# Patient Record
Sex: MALE | Race: WHITE | NOT HISPANIC OR LATINO | Employment: OTHER | URBAN - METROPOLITAN AREA
[De-identification: names, ages, dates, MRNs, and addresses within clinical notes are randomized per-mention and may not be internally consistent; named-entity substitution may affect disease eponyms.]

---

## 2017-02-11 ENCOUNTER — HOSPITAL ENCOUNTER (OUTPATIENT)
Dept: LAB | Facility: HOSPITAL | Age: 62
Discharge: HOME | End: 2017-02-11
Attending: FAMILY MEDICINE | Admitting: FAMILY MEDICINE

## 2017-02-11 LAB
ALBUMIN SERPL-MCNC: 4 G/DL (ref 3.5–4.8)
ALBUMIN/GLOB SERPL: 1.3 {RATIO} (ref 1–1.7)
ALP SERPL-CCNC: 61 IU/L (ref 32–91)
ALT SERPL-CCNC: 25 IU/L (ref 17–63)
ANION GAP SERPL CALC-SCNC: 9.4 MMOL/L (ref 10–20)
AST SERPL-CCNC: 21 IU/L (ref 15–41)
BASOPHILS # BLD AUTO: 0 10*3/UL (ref 0–0.2)
BASOPHILS NFR BLD AUTO: 1 % (ref 0–2)
BILIRUB SERPL-MCNC: 0.7 MG/DL (ref 0.3–1.2)
BUN SERPL-MCNC: 13 MG/DL (ref 8–20)
BUN/CREAT SERPL: 16.3 (ref 6.2–20.3)
CALCIUM SERPL-MCNC: 9.7 MG/DL (ref 8.9–10.3)
CHLORIDE SERPL-SCNC: 106 MMOL/L (ref 101–111)
CHOLEST SERPL-MCNC: 176 MG/DL
CHOLEST/HDLC SERPL: 4.5 {RATIO}
CONV CO2: 28 MMOL/L (ref 22–32)
CONV LDL CHOLESTEROL DIRECT: 120 MG/DL (ref 0–100)
CONV TOTAL PROTEIN: 7 G/DL (ref 6.1–7.9)
CREAT UR-MCNC: 0.8 MG/DL (ref 0.7–1.2)
DIFFERENTIAL METHOD BLD: (no result)
EOSINOPHIL # BLD AUTO: 0.4 10*3/UL (ref 0–0.3)
EOSINOPHIL # BLD AUTO: 6 % (ref 0–3)
ERYTHROCYTE [DISTWIDTH] IN BLOOD BY AUTOMATED COUNT: 14.4 % (ref 11.5–14.5)
GLOBULIN UR ELPH-MCNC: 3 G/DL (ref 2.5–3.8)
GLUCOSE SERPL-MCNC: 102 MG/DL (ref 65–99)
HCT VFR BLD AUTO: 49.5 % (ref 40–54)
HDLC SERPL-MCNC: 39 MG/DL
HGB BLD-MCNC: 16.6 G/DL (ref 14–18)
LDLC/HDLC SERPL: 3.1 {RATIO}
LIPID INTERPRETATION: ABNORMAL
LYMPHOCYTES # BLD AUTO: 1.9 10*3/UL (ref 0.8–4.8)
LYMPHOCYTES NFR BLD AUTO: 29 % (ref 18–42)
MCH RBC QN AUTO: 31.2 PG (ref 26–32)
MCHC RBC AUTO-ENTMCNC: 33.4 G/DL (ref 32–36)
MCV RBC AUTO: 93.4 FL (ref 80–94)
MONOCYTES # BLD AUTO: 0.5 10*3/UL (ref 0.1–1.3)
MONOCYTES NFR BLD AUTO: 7 % (ref 2–11)
NEUTROPHILS # BLD AUTO: 3.8 10*3/UL (ref 2.3–8.6)
NEUTROPHILS NFR BLD AUTO: 57 % (ref 50–75)
NRBC BLD AUTO-RTO: 0 /100{WBCS}
NRBC/RBC NFR BLD MANUAL: 0 10*3/UL
PLATELET # BLD AUTO: 159 10*3/UL (ref 150–450)
PMV BLD AUTO: 10 FL (ref 7.4–10.4)
POTASSIUM SERPL-SCNC: 4.4 MMOL/L (ref 3.6–5.1)
RBC # BLD AUTO: 5.3 10*6/UL (ref 4.6–6)
SODIUM SERPL-SCNC: 139 MMOL/L (ref 136–144)
TRIGL SERPL-MCNC: 93 MG/DL
TSH SERPL-ACNC: 0.39 UIU/ML (ref 0.34–5.6)
VLDLC SERPL CALC-MCNC: 17 MG/DL
WBC # BLD AUTO: 6.6 10*3/UL (ref 4.5–11.5)

## 2017-09-08 ENCOUNTER — HOSPITAL ENCOUNTER (OUTPATIENT)
Dept: OTHER | Facility: HOSPITAL | Age: 62
Setting detail: SPECIMEN
Discharge: HOME | End: 2017-09-08
Attending: FAMILY MEDICINE | Admitting: FAMILY MEDICINE

## 2019-11-14 RX ORDER — FUROSEMIDE 20 MG/1
TABLET ORAL
COMMUNITY
Start: 2015-04-27 | End: 2019-11-14 | Stop reason: SDUPTHER

## 2019-11-15 RX ORDER — FUROSEMIDE 20 MG/1
20 TABLET ORAL DAILY
Qty: 30 TABLET | Refills: 0 | Status: SHIPPED | OUTPATIENT
Start: 2019-11-15 | End: 2020-02-03

## 2019-11-15 RX ORDER — LISINOPRIL 10 MG/1
10 TABLET ORAL DAILY
Qty: 30 TABLET | Refills: 0 | Status: SHIPPED | OUTPATIENT
Start: 2019-11-15 | End: 2020-02-03

## 2019-11-15 RX ORDER — LISINOPRIL 10 MG/1
TABLET ORAL
COMMUNITY
Start: 2015-03-09 | End: 2019-11-15 | Stop reason: SDUPTHER

## 2020-02-02 PROBLEM — M25.512 ARTHRALGIA OF LEFT ACROMIOCLAVICULAR JOINT: Status: ACTIVE | Noted: 2017-11-16

## 2020-02-03 ENCOUNTER — OFFICE VISIT (OUTPATIENT)
Dept: FAMILY MEDICINE CLINIC | Facility: CLINIC | Age: 65
End: 2020-02-03

## 2020-02-03 VITALS
WEIGHT: 257 LBS | BODY MASS INDEX: 35.98 KG/M2 | SYSTOLIC BLOOD PRESSURE: 149 MMHG | HEART RATE: 73 BPM | TEMPERATURE: 98.1 F | DIASTOLIC BLOOD PRESSURE: 93 MMHG | OXYGEN SATURATION: 96 % | HEIGHT: 71 IN

## 2020-02-03 DIAGNOSIS — I10 ESSENTIAL HYPERTENSION: Primary | ICD-10-CM

## 2020-02-03 DIAGNOSIS — R73.09 ABNORMAL GLUCOSE: ICD-10-CM

## 2020-02-03 DIAGNOSIS — I89.0 LYMPHEDEMA: ICD-10-CM

## 2020-02-03 DIAGNOSIS — F32.A DEPRESSION, UNSPECIFIED DEPRESSION TYPE: ICD-10-CM

## 2020-02-03 DIAGNOSIS — E78.2 HYPERLIPEMIA, MIXED: ICD-10-CM

## 2020-02-03 DIAGNOSIS — F17.210 CIGARETTE SMOKER: ICD-10-CM

## 2020-02-03 DIAGNOSIS — R42 VERTIGO: ICD-10-CM

## 2020-02-03 DIAGNOSIS — Z12.5 SCREENING FOR PROSTATE CANCER: ICD-10-CM

## 2020-02-03 DIAGNOSIS — Z87.828 HISTORY OF HEAD INJURY: ICD-10-CM

## 2020-02-03 DIAGNOSIS — Z80.42 FAMILY HISTORY OF PROSTATE CANCER: ICD-10-CM

## 2020-02-03 PROBLEM — R23.3 PETECHIAE: Status: ACTIVE | Noted: 2017-07-27

## 2020-02-03 PROBLEM — A46 ERYSIPELAS OF LOWER EXTREMITY: Status: ACTIVE | Noted: 2017-04-06

## 2020-02-03 PROBLEM — L72.9 CYST OF SKIN: Status: ACTIVE | Noted: 2017-12-13
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
## 2020-02-03 PROBLEM — IMO0002 DEGENERATION OF INTERVERTEBRAL DISC: Status: ACTIVE | Noted: 2017-10-13

## 2020-02-03 PROCEDURE — 99214 OFFICE O/P EST MOD 30 MIN: CPT | Performed by: FAMILY MEDICINE

## 2020-02-03 RX ORDER — LISINOPRIL 10 MG/1
10 TABLET ORAL DAILY
Qty: 90 TABLET | Refills: 3 | Status: SHIPPED | OUTPATIENT
Start: 2020-02-03 | End: 2020-04-06

## 2020-02-03 RX ORDER — CETIRIZINE HYDROCHLORIDE 10 MG/1
1 TABLET ORAL DAILY
COMMUNITY
Start: 2016-11-16

## 2020-02-03 RX ORDER — MECLIZINE HYDROCHLORIDE 25 MG/1
25 TABLET ORAL 3 TIMES DAILY PRN
Qty: 30 TABLET | Refills: 1 | Status: SHIPPED | OUTPATIENT
Start: 2020-02-03 | End: 2020-08-03

## 2020-02-03 RX ORDER — FUROSEMIDE 20 MG/1
10 TABLET ORAL DAILY
Qty: 90 TABLET | Refills: 1 | Status: SHIPPED | OUTPATIENT
Start: 2020-02-03 | End: 2020-08-07

## 2020-02-03 RX ORDER — FLUOXETINE HYDROCHLORIDE 20 MG/1
20 CAPSULE ORAL DAILY
Qty: 90 CAPSULE | Refills: 1 | Status: SHIPPED | OUTPATIENT
Start: 2020-02-03 | End: 2020-08-03

## 2020-02-03 NOTE — PATIENT INSTRUCTIONS
"DASH Eating Plan  DASH stands for \"Dietary Approaches to Stop Hypertension.\" The DASH eating plan is a healthy eating plan that has been shown to reduce high blood pressure (hypertension). It may also reduce your risk for type 2 diabetes, heart disease, and stroke. The DASH eating plan may also help with weight loss.  What are tips for following this plan?    General guidelines  · Avoid eating more than 2,300 mg (milligrams) of salt (sodium) a day. If you have hypertension, you may need to reduce your sodium intake to 1,500 mg a day.  · Limit alcohol intake to no more than 1 drink a day for nonpregnant women and 2 drinks a day for men. One drink equals 12 oz of beer, 5 oz of wine, or 1½ oz of hard liquor.  · Work with your health care provider to maintain a healthy body weight or to lose weight. Ask what an ideal weight is for you.  · Get at least 30 minutes of exercise that causes your heart to beat faster (aerobic exercise) most days of the week. Activities may include walking, swimming, or biking.  · Work with your health care provider or diet and nutrition specialist (dietitian) to adjust your eating plan to your individual calorie needs.  Reading food labels    · Check food labels for the amount of sodium per serving. Choose foods with less than 5 percent of the Daily Value of sodium. Generally, foods with less than 300 mg of sodium per serving fit into this eating plan.  · To find whole grains, look for the word \"whole\" as the first word in the ingredient list.  Shopping  · Buy products labeled as \"low-sodium\" or \"no salt added.\"  · Buy fresh foods. Avoid canned foods and premade or frozen meals.  Cooking  · Avoid adding salt when cooking. Use salt-free seasonings or herbs instead of table salt or sea salt. Check with your health care provider or pharmacist before using salt substitutes.  · Do not wei foods. Cook foods using healthy methods such as baking, boiling, grilling, and broiling instead.  · Cook with " heart-healthy oils, such as olive, canola, soybean, or sunflower oil.  Meal planning  · Eat a balanced diet that includes:  ? 5 or more servings of fruits and vegetables each day. At each meal, try to fill half of your plate with fruits and vegetables.  ? Up to 6-8 servings of whole grains each day.  ? Less than 6 oz of lean meat, poultry, or fish each day. A 3-oz serving of meat is about the same size as a deck of cards. One egg equals 1 oz.  ? 2 servings of low-fat dairy each day.  ? A serving of nuts, seeds, or beans 5 times each week.  ? Heart-healthy fats. Healthy fats called Omega-3 fatty acids are found in foods such as flaxseeds and coldwater fish, like sardines, salmon, and mackerel.  · Limit how much you eat of the following:  ? Canned or prepackaged foods.  ? Food that is high in trans fat, such as fried foods.  ? Food that is high in saturated fat, such as fatty meat.  ? Sweets, desserts, sugary drinks, and other foods with added sugar.  ? Full-fat dairy products.  · Do not salt foods before eating.  · Try to eat at least 2 vegetarian meals each week.  · Eat more home-cooked food and less restaurant, buffet, and fast food.  · When eating at a restaurant, ask that your food be prepared with less salt or no salt, if possible.  What foods are recommended?  The items listed may not be a complete list. Talk with your dietitian about what dietary choices are best for you.  Grains  Whole-grain or whole-wheat bread. Whole-grain or whole-wheat pasta. Brown rice. Oatmeal. Quinoa. Bulgur. Whole-grain and low-sodium cereals. Mary bread. Low-fat, low-sodium crackers. Whole-wheat flour tortillas.  Vegetables  Fresh or frozen vegetables (raw, steamed, roasted, or grilled). Low-sodium or reduced-sodium tomato and vegetable juice. Low-sodium or reduced-sodium tomato sauce and tomato paste. Low-sodium or reduced-sodium canned vegetables.  Fruits  All fresh, dried, or frozen fruit. Canned fruit in natural juice (without  added sugar).  Meat and other protein foods  Skinless chicken or turkey. Ground chicken or turkey. Pork with fat trimmed off. Fish and seafood. Egg whites. Dried beans, peas, or lentils. Unsalted nuts, nut butters, and seeds. Unsalted canned beans. Lean cuts of beef with fat trimmed off. Low-sodium, lean deli meat.  Dairy  Low-fat (1%) or fat-free (skim) milk. Fat-free, low-fat, or reduced-fat cheeses. Nonfat, low-sodium ricotta or cottage cheese. Low-fat or nonfat yogurt. Low-fat, low-sodium cheese.  Fats and oils  Soft margarine without trans fats. Vegetable oil. Low-fat, reduced-fat, or light mayonnaise and salad dressings (reduced-sodium). Canola, safflower, olive, soybean, and sunflower oils. Avocado.  Seasoning and other foods  Herbs. Spices. Seasoning mixes without salt. Unsalted popcorn and pretzels. Fat-free sweets.  What foods are not recommended?  The items listed may not be a complete list. Talk with your dietitian about what dietary choices are best for you.  Grains  Baked goods made with fat, such as croissants, muffins, or some breads. Dry pasta or rice meal packs.  Vegetables  Creamed or fried vegetables. Vegetables in a cheese sauce. Regular canned vegetables (not low-sodium or reduced-sodium). Regular canned tomato sauce and paste (not low-sodium or reduced-sodium). Regular tomato and vegetable juice (not low-sodium or reduced-sodium). Pickles. Olives.  Fruits  Canned fruit in a light or heavy syrup. Fried fruit. Fruit in cream or butter sauce.  Meat and other protein foods  Fatty cuts of meat. Ribs. Fried meat. Osorio. Sausage. Bologna and other processed lunch meats. Salami. Fatback. Hotdogs. Bratwurst. Salted nuts and seeds. Canned beans with added salt. Canned or smoked fish. Whole eggs or egg yolks. Chicken or turkey with skin.  Dairy  Whole or 2% milk, cream, and half-and-half. Whole or full-fat cream cheese. Whole-fat or sweetened yogurt. Full-fat cheese. Nondairy creamers. Whipped toppings.  Processed cheese and cheese spreads.  Fats and oils  Butter. Stick margarine. Lard. Shortening. Ghee. Osorio fat. Tropical oils, such as coconut, palm kernel, or palm oil.  Seasoning and other foods  Salted popcorn and pretzels. Onion salt, garlic salt, seasoned salt, table salt, and sea salt. Worcestershire sauce. Tartar sauce. Barbecue sauce. Teriyaki sauce. Soy sauce, including reduced-sodium. Steak sauce. Canned and packaged gravies. Fish sauce. Oyster sauce. Cocktail sauce. Horseradish that you find on the shelf. Ketchup. Mustard. Meat flavorings and tenderizers. Bouillon cubes. Hot sauce and Tabasco sauce. Premade or packaged marinades. Premade or packaged taco seasonings. Relishes. Regular salad dressings.  Where to find more information:  · National Heart, Lung, and Blood [place]: www.nhlbi.nih.gov  · American Heart Association: www.heart.org  Summary  · The DASH eating plan is a healthy eating plan that has been shown to reduce high blood pressure (hypertension). It may also reduce your risk for type 2 diabetes, heart disease, and stroke.  · With the DASH eating plan, you should limit salt (sodium) intake to 2,300 mg a day. If you have hypertension, you may need to reduce your sodium intake to 1,500 mg a day.  · When on the DASH eating plan, aim to eat more fresh fruits and vegetables, whole grains, lean proteins, low-fat dairy, and heart-healthy fats.  · Work with your health care provider or diet and nutrition specialist (dietitian) to adjust your eating plan to your individual calorie needs.  This information is not intended to replace advice given to you by your health care provider. Make sure you discuss any questions you have with your health care provider.  Document Released: 12/06/2012 Document Revised: 12/11/2017 Document Reviewed: 12/11/2017  [employer] Interactive Patient Education © 2019 [employer]    Lymphedema    Lymphedema is swelling that is caused by the abnormal collection of lymph in  the tissues under the skin. Lymph is fluid from the tissues in your body that is removed through the lymphatic system. This system is part of your body's defense system (immune system) and includes lymph nodes and lymph vessels. The lymph vessels collect and carry the excess fluid, fats, proteins, and wastes from the tissues of the body to the bloodstream. This system also works to clean and remove bacteria and waste products from the body.  Lymphedema occurs when the lymphatic system is blocked. When the lymph vessels or lymph nodes are blocked or damaged, lymph does not drain properly. This causes an abnormal buildup of lymph, which leads to swelling in the affected area. This may include the trunk area, or an arm or leg. Lymphedema cannot be cured by medicines, but various methods can be used to help reduce the swelling.  There are two types of lymphedema: primary lymphedema and secondary lymphedema.  What are the causes?  The cause of this condition depends on the type of lymphedema that you have.  · Primary lymphedema is caused by the absence of lymph vessels or having abnormal lymph vessels at birth.  · Secondary lymphedema occurs when lymph vessels are blocked or damaged. Secondary lymphedema is more common. Common causes of lymph vessel blockage include:  ? Skin infection, such as cellulitis.  ? Infection by parasites (filariasis).  ? Injury.  ? Radiation therapy.  ? Cancer.  ? Formation of scar tissue.  ? Surgery.  What are the signs or symptoms?  Symptoms of this condition include:  · Swelling of the arm or leg.  · A heavy or tight feeling in the arm or leg.  · Swelling of the feet, toes, or fingers. Shoes or rings may fit more tightly than before.  · Redness of the skin over the affected area.  · Limited movement of the affected limb.  · Sensitivity to touch or discomfort in the affected limb.  How is this diagnosed?  This condition may be diagnosed based on:  · Your symptoms and medical history.  · A  physical exam.  · Bioimpedance spectroscopy. In this test, painless electrical currents are used to measure fluid levels in your body.  · Imaging tests, such as:  ? Lymphoscintigraphy. In this test, a low dose of a radioactive substance is injected to trace the flow of lymph through the lymph vessels.  ? MRI.  ? CT scan.  ? Duplex ultrasound. This test uses sound waves to produce images of the vessels and the blood flow on a screen.  ? Lymphangiography. In this test, a contrast dye is injected into the lymph vessel to help show blockages.  How is this treated?  Treatment for this condition may depend on the cause of your lymphedema. Treatment may include:  · Complete decongestive therapy (CDT). This is done by a certified lymphedema therapist to reduce fluid congestion. This therapy includes:  ? Manual lymph drainage. This is a special massage technique that promotes lymph drainage out of a limb.  ? Skin care.  ? Compression wrapping of the affected area.  ? Specific exercises. Certain exercises can help fluid move out of the affected limb.  · Compression. Various methods may be used to apply pressure to the affected limb to reduce the swelling. They include:  ? Wearing compression stockings or sleeves on the affected limb.  ? Wrapping the affected limb with special bandages.  · Surgery. This is usually done for severe cases only. For example, surgery may be done if you have trouble moving the limb or if the swelling does not get better with other treatments.  If an underlying condition is causing the lymphedema, treatment for that condition will be done. For example, antibiotic medicines may be used to treat an infection.  Follow these instructions at home:  Self-care  · The affected area is more likely to become injured or infected. Take these steps to help prevent infection:  ? Keep the affected area clean and dry.  ? Use approved creams or lotions to keep the skin moisturized.  ? Protect your skin from  cuts:  § Use gloves while cooking or gardening.  § Do not walk barefoot.  § If you shave the affected area, use an electric razor.  · Do not wear tight clothes, shoes, or jewelry.  · Eat a healthy diet that includes a lot of fruits and vegetables.  Activity  · Exercise regularly as directed by your health care provider.  · Do not sit with your legs crossed.  · When possible, keep the affected limb raised (elevated) above the level of your heart.  · Avoid carrying things with an arm that is affected by lymphedema.  General instructions  · Wear compression stockings or sleeves as told by your health care provider.  · Note any changes in size of the affected limb. You may be instructed to take regular measurements and keep track of them.  · Take over-the-counter and prescription medicines only as told by your health care provider.  · If you were prescribed an antibiotic medicine, take or apply it as told by your health care provider. Do not stop using the antibiotic even if you start to feel better.  · Do not use heating pads or ice packs over the affected area.  · Avoid having blood draws, IV insertions, or blood pressure checked on the affected limb.  · Keep all follow-up visits as told by your health care provider. This is important.  Contact a health care provider if you:  · Continue to have swelling in your limb.  · Have a cut that does not heal.  · Have redness or pain in the affected area.  Get help right away if you:  · Have new swelling in your limb that comes on suddenly.  · Develop purplish spots, rash or sores (lesions) on your affected limb.  · Have shortness of breath.  · Have a fever or chills.  Summary  · Lymphedema is swelling that is caused by the abnormal collection of lymph in the tissues under the skin.  · Lymph is fluid from the tissues in your body that is removed through the lymphatic system. This system collects and carries excess fluid, fats, proteins, and wastes from the tissues of the body  to the bloodstream.  · Lymphedema causes swelling, pain, and redness in the affected area. This may include the trunk area, or an arm or leg.  · Treatment for this condition may depend on the cause of your lymphedema. Treatment may include complete decongestive therapy (CDT), compression methods, surgery, or treating the underlying cause.  This information is not intended to replace advice given to you by your health care provider. Make sure you discuss any questions you have with your health care provider.  Document Released: 10/14/2008 Document Revised: 12/31/2018 Document Reviewed: 12/31/2018  [employer] Interactive Patient Education © 2019 [employer]
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
## 2020-02-04 ENCOUNTER — RESULTS ENCOUNTER (OUTPATIENT)
Dept: FAMILY MEDICINE CLINIC | Facility: CLINIC | Age: 65
End: 2020-02-04

## 2020-02-04 DIAGNOSIS — Z80.42 FAMILY HISTORY OF PROSTATE CANCER: ICD-10-CM

## 2020-02-04 DIAGNOSIS — Z12.5 SCREENING FOR PROSTATE CANCER: ICD-10-CM

## 2020-02-04 DIAGNOSIS — E78.2 HYPERLIPEMIA, MIXED: ICD-10-CM

## 2020-02-04 DIAGNOSIS — R73.09 ABNORMAL GLUCOSE: ICD-10-CM

## 2020-02-04 DIAGNOSIS — I10 ESSENTIAL HYPERTENSION: ICD-10-CM

## 2020-02-05 DIAGNOSIS — E78.2 HYPERLIPEMIA, MIXED: Primary | ICD-10-CM

## 2020-02-05 DIAGNOSIS — R73.09 ABNORMAL GLUCOSE: ICD-10-CM

## 2020-02-05 DIAGNOSIS — I10 ESSENTIAL HYPERTENSION: ICD-10-CM

## 2020-02-05 LAB
ALBUMIN SERPL-MCNC: 4.9 G/DL (ref 3.8–4.8)
ALBUMIN/GLOB SERPL: 2.5 {RATIO} (ref 1.2–2.2)
ALP SERPL-CCNC: 64 IU/L (ref 39–117)
ALT SERPL-CCNC: 14 IU/L (ref 0–44)
AST SERPL-CCNC: 17 IU/L (ref 0–40)
BILIRUB SERPL-MCNC: 0.5 MG/DL (ref 0–1.2)
BUN SERPL-MCNC: 18 MG/DL (ref 8–27)
BUN/CREAT SERPL: 19 (ref 10–24)
CALCIUM SERPL-MCNC: 9.9 MG/DL (ref 8.6–10.2)
CHLORIDE SERPL-SCNC: 102 MMOL/L (ref 96–106)
CHOLEST SERPL-MCNC: 195 MG/DL (ref 100–199)
CO2 SERPL-SCNC: 24 MMOL/L (ref 20–29)
CREAT SERPL-MCNC: 0.96 MG/DL (ref 0.76–1.27)
GLOBULIN SER CALC-MCNC: 2 G/DL (ref 1.5–4.5)
GLUCOSE SERPL-MCNC: 98 MG/DL (ref 65–99)
HBA1C MFR BLD: 5.7 % (ref 4.8–5.6)
HDLC SERPL-MCNC: 42 MG/DL
LDLC SERPL CALC-MCNC: 132 MG/DL (ref 0–99)
POTASSIUM SERPL-SCNC: 4.7 MMOL/L (ref 3.5–5.2)
PROT SERPL-MCNC: 6.9 G/DL (ref 6–8.5)
PSA SERPL-MCNC: 0.7 NG/ML (ref 0–4)
SODIUM SERPL-SCNC: 142 MMOL/L (ref 134–144)
TRIGL SERPL-MCNC: 104 MG/DL (ref 0–149)
VLDLC SERPL CALC-MCNC: 21 MG/DL (ref 5–40)

## 2020-02-05 RX ORDER — ATORVASTATIN CALCIUM 10 MG/1
10 TABLET, FILM COATED ORAL DAILY
Qty: 90 TABLET | Refills: 1 | Status: SHIPPED | OUTPATIENT
Start: 2020-02-05 | End: 2020-08-07 | Stop reason: SINTOL

## 2020-02-16 PROBLEM — F32.A DEPRESSION: Status: ACTIVE | Noted: 2020-02-16

## 2020-02-16 PROBLEM — Z87.828 HISTORY OF TRAUMATIC HEAD INJURY: Status: ACTIVE | Noted: 2020-02-16

## 2020-02-16 PROBLEM — Z91.89 CANDIDATE FOR STATIN THERAPY DUE TO RISK OF FUTURE CARDIOVASCULAR EVENT: Status: ACTIVE | Noted: 2020-02-16

## 2020-02-17 ENCOUNTER — OFFICE VISIT (OUTPATIENT)
Dept: FAMILY MEDICINE CLINIC | Facility: CLINIC | Age: 65
End: 2020-02-17

## 2020-02-17 VITALS
OXYGEN SATURATION: 94 % | WEIGHT: 257 LBS | HEIGHT: 71 IN | HEART RATE: 76 BPM | DIASTOLIC BLOOD PRESSURE: 84 MMHG | SYSTOLIC BLOOD PRESSURE: 139 MMHG | BODY MASS INDEX: 35.98 KG/M2 | TEMPERATURE: 98 F

## 2020-02-17 DIAGNOSIS — Z91.89 CANDIDATE FOR STATIN THERAPY DUE TO RISK OF FUTURE CARDIOVASCULAR EVENT: ICD-10-CM

## 2020-02-17 DIAGNOSIS — F32.A DEPRESSION, UNSPECIFIED DEPRESSION TYPE: ICD-10-CM

## 2020-02-17 DIAGNOSIS — S42.022S CLOSED DISPLACED FRACTURE OF SHAFT OF LEFT CLAVICLE, SEQUELA: ICD-10-CM

## 2020-02-17 DIAGNOSIS — I10 ESSENTIAL HYPERTENSION: Primary | ICD-10-CM

## 2020-02-17 DIAGNOSIS — Z87.828 HISTORY OF TRAUMATIC HEAD INJURY: ICD-10-CM

## 2020-02-17 PROCEDURE — 99214 OFFICE O/P EST MOD 30 MIN: CPT | Performed by: FAMILY MEDICINE

## 2020-02-17 RX ORDER — GABAPENTIN 100 MG/1
CAPSULE ORAL
Qty: 150 CAPSULE | Refills: 0 | Status: SHIPPED | OUTPATIENT
Start: 2020-02-17 | End: 2020-03-08
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
## 2020-03-08 DIAGNOSIS — S42.022S CLOSED DISPLACED FRACTURE OF SHAFT OF LEFT CLAVICLE, SEQUELA: ICD-10-CM

## 2020-03-08 RX ORDER — GABAPENTIN 100 MG/1
CAPSULE ORAL
Qty: 150 CAPSULE | Refills: 0 | Status: SHIPPED | OUTPATIENT
Start: 2020-03-08 | End: 2020-04-14

## 2020-04-06 ENCOUNTER — TELEPHONE (OUTPATIENT)
Dept: FAMILY MEDICINE CLINIC | Facility: CLINIC | Age: 65
End: 2020-04-06

## 2020-04-06 DIAGNOSIS — I10 ESSENTIAL HYPERTENSION: ICD-10-CM

## 2020-04-06 RX ORDER — LISINOPRIL 10 MG/1
TABLET ORAL
Qty: 180 TABLET | Refills: 3 | Status: SHIPPED | OUTPATIENT
Start: 2020-04-06 | End: 2020-05-01

## 2020-04-06 NOTE — TELEPHONE ENCOUNTER
TC from patient requesting refill of the following med be sent to [facility]:    Lisinopril 10mg   Takes 15mg in AM and 5mg in PM.    This is a dosage change since last RX

## 2020-04-06 NOTE — TELEPHONE ENCOUNTER
Please clarify.   At OV on 2/3/2020 BP was not at goal and increased lisinopril from 5 mg to 10 mg once daily.   At most recent OV BP was at upper limit of goal and he declined to increase further.   Did he have increased home readings and increase the dose? Did he accidentally take new and old RX at same time?  regardless it sounds like he is currently taking 20 mg daily. Ask what recent home BP readings are doing and assuming between 110-140/60-80 ask if he would be willing to change to 20 mg one tablet daily

## 2020-04-06 NOTE — TELEPHONE ENCOUNTER
Pt states that he has been on the 15mg in am and 5mg pm for about a month and a half bc his pressure got up to 150/86 and he changed dose himself. He said this am it was 135/82. He is willing to go to the 20mg one tablet daily but he said he feels comfortable with the 15mg in morning and 5mg pm to sleep on and feels it is holding his pressure at control.

## 2020-04-14 DIAGNOSIS — S42.022S CLOSED DISPLACED FRACTURE OF SHAFT OF LEFT CLAVICLE, SEQUELA: ICD-10-CM

## 2020-04-14 RX ORDER — GABAPENTIN 100 MG/1
CAPSULE ORAL
Qty: 150 CAPSULE | Refills: 3 | Status: SHIPPED | OUTPATIENT
Start: 2020-04-14 | End: 2020-04-22

## 2020-04-20 ENCOUNTER — TELEPHONE (OUTPATIENT)
Dept: FAMILY MEDICINE CLINIC | Facility: CLINIC | Age: 65
End: 2020-04-20

## 2020-04-20 NOTE — TELEPHONE ENCOUNTER
PATIENT HAS A VERY TENDER AREA ON THE BACK OF HIS HEAD, HE IS INQUIRING IF HE COULD BE SEEN.  PLEASE LET HIM KNOW WHAT TYPE OF VISIT HE NEEDS TO HAVE

## 2020-04-20 NOTE — TELEPHONE ENCOUNTER
Spoke with patient, he states that there is not a cyst or bite or a lump. It is just a sore spot when he rubs his hand over the back of his head and it hurts when he lays down at night. The area is numb like feeling.   Do you still want patient to come in office or do a telephone visit. Just clarifying

## 2020-04-20 NOTE — TELEPHONE ENCOUNTER
Spoke with patient, he states this did not start after his neurontin,i placed him on schedule for wed for a video visit.

## 2020-04-20 NOTE — TELEPHONE ENCOUNTER
Please ask patient if this started after he started Neurontin?  Could possibly be a side effect of neuropathy.  Otherwise it could be due to nerve injury from his previous head injury and Neurontin could actually make it better.  Please offer patient a video visit to discuss in further detail.

## 2020-04-20 NOTE — TELEPHONE ENCOUNTER
Please contact patient and get additional history.  Due to has had, is there a cyst or a mass or an abscess, has he had a tick or other insect bite?  If needed please offer him an appointment this afternoon.

## 2020-04-22 ENCOUNTER — TELEMEDICINE (OUTPATIENT)
Dept: FAMILY MEDICINE CLINIC | Facility: CLINIC | Age: 65
End: 2020-04-22

## 2020-04-22 DIAGNOSIS — S42.022S CLOSED DISPLACED FRACTURE OF SHAFT OF LEFT CLAVICLE, SEQUELA: ICD-10-CM

## 2020-04-22 DIAGNOSIS — M25.512 ARTHRALGIA OF LEFT ACROMIOCLAVICULAR JOINT: ICD-10-CM

## 2020-04-22 DIAGNOSIS — S16.1XXA CERVICAL MUSCLE STRAIN, INITIAL ENCOUNTER: Primary | ICD-10-CM

## 2020-04-22 DIAGNOSIS — G54.0 BRACHIAL PLEXUS NEUROPATHY: ICD-10-CM

## 2020-04-22 PROCEDURE — 99442 PR PHYS/QHP TELEPHONE EVALUATION 11-20 MIN: CPT | Performed by: FAMILY MEDICINE

## 2020-04-22 RX ORDER — CYCLOBENZAPRINE HCL 10 MG
TABLET ORAL
Qty: 30 TABLET | Refills: 1 | Status: SHIPPED | OUTPATIENT
Start: 2020-04-22 | End: 2020-08-07

## 2020-04-22 RX ORDER — GABAPENTIN 100 MG/1
CAPSULE ORAL
Qty: 210 CAPSULE | Refills: 3 | Status: SHIPPED | OUTPATIENT
Start: 2020-04-22 | End: 2020-05-20 | Stop reason: SDUPTHER
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
## 2020-04-22 NOTE — PATIENT INSTRUCTIONS
Cervical Strain and Sprain Rehab  Ask your health care provider which exercises are safe for you. Do exercises exactly as told by your health care provider and adjust them as directed. It is normal to feel mild stretching, pulling, tightness, or discomfort as you do these exercises. Stop right away if you feel sudden pain or your pain gets worse. Do not begin these exercises until told by your health care provider.  Stretching and range-of-motion exercises  Cervical side bending    1. Using good posture, sit on a stable chair or stand up.  2. Without moving your shoulders, slowly tilt your left / right ear to your shoulder until you feel a stretch in the opposite side neck muscles. You should be looking straight ahead.  3. Hold for __________ seconds.  4. Repeat with the other side of your neck.  Repeat __________ times. Complete this exercise __________ times a day.  Cervical rotation    1. Using good posture, sit on a stable chair or stand up.  2. Slowly turn your head to the side as if you are looking over your left / right shoulder.  ? Keep your eyes level with the ground.  ? Stop when you feel a stretch along the side and the back of your neck.  3. Hold for __________ seconds.  4. Repeat this by turning to your other side.  Repeat __________ times. Complete this exercise __________ times a day.  Thoracic extension and pectoral stretch  1. Roll a towel or a small blanket so it is about 4 inches (10 cm) in diameter.  2. Lie down on your back on a firm surface.  3. Put the towel lengthwise, under your spine in the middle of your back. It should not be under your shoulder blades. The towel should line up with your spine from your middle back to your lower back.  4. Put your hands behind your head and let your elbows fall out to your sides.  5. Hold for __________ seconds.  Repeat __________ times. Complete this exercise __________ times a day.  Strengthening exercises  Isometric upper cervical flexion  1. Lie on  your back with a thin pillow behind your head and a small rolled-up towel under your neck.  2. Gently tuck your chin toward your chest and nod your head down to look toward your feet. Do not lift your head off the pillow.  3. Hold for __________ seconds.  4. Release the tension slowly. Relax your neck muscles completely before you repeat this exercise.  Repeat __________ times. Complete this exercise __________ times a day.  Isometric cervical extension    1. Stand about 6 inches (15 cm) away from a wall, with your back facing the wall.  2. Place a soft object, about 6-8 inches (15-20 cm) in diameter, between the back of your head and the wall. A soft object could be a small pillow, a ball, or a folded towel.  3. Gently tilt your head back and press into the soft object. Keep your jaw and forehead relaxed.  4. Hold for __________ seconds.  5. Release the tension slowly. Relax your neck muscles completely before you repeat this exercise.  Repeat __________ times. Complete this exercise __________ times a day.  Posture and body mechanics  Body mechanics refers to the movements and positions of your body while you do your daily activities. Posture is part of body mechanics. Good posture and healthy body mechanics can help to relieve stress in your body's tissues and joints. Good posture means that your spine is in its natural S-curve position (your spine is neutral), your shoulders are pulled back slightly, and your head is not tipped forward. The following are general guidelines for applying improved posture and body mechanics to your everyday activities.  Sitting    1. When sitting, keep your spine neutral and keep your feet flat on the floor. Use a footrest, if necessary, and keep your thighs parallel to the floor. Avoid rounding your shoulders, and avoid tilting your head forward.  2. When working at a desk or a computer, keep your desk at a height where your hands are slightly lower than your elbows. Slide your  chair under your desk so you are close enough to maintain good posture.  3. When working at a computer, place your monitor at a height where you are looking straight ahead and you do not have to tilt your head forward or downward to look at the screen.  Standing    · When standing, keep your spine neutral and keep your feet about hip-width apart. Keep a slight bend in your knees. Your ears, shoulders, and hips should line up.  · When you do a task in which you  one place for a long time, place one foot up on a stable object that is 2-4 inches (5-10 cm) high, such as a footstool. This helps keep your spine neutral.  Resting  When lying down and resting, avoid positions that are most painful for you. Try to support your neck in a neutral position. You can use a contour pillow or a small rolled-up towel. Your pillow should support your neck but not push on it.  This information is not intended to replace advice given to you by your health care provider. Make sure you discuss any questions you have with your health care provider.  Document Released: 12/18/2006 Document Revised: 09/18/2019 Document Reviewed: 09/18/2019  [employer] Interactive Patient Education © 2020 [employer]    Paresthesia  Paresthesia is an abnormal burning or prickling sensation. It is usually felt in the hands, arms, legs, or feet. However, it may occur in any part of the body. Usually, paresthesia is not painful. It may feel like:  · Tingling or numbness.  · Buzzing.  · Itching.  Paresthesia may occur without any clear cause, or it may be caused by:  · Breathing too quickly (hyperventilation).  · Pressure on a nerve.  · An underlying medical condition.  · Side effects of a medication.  · Nutritional deficiencies.  · Exposure to toxic chemicals.  Most people experience temporary (transient) paresthesia at some time in their lives. For some people, it may be long-lasting (chronic) because of an underlying medical condition. If you have  paresthesia that lasts a long time, you may need to be evaluated by your health care provider.  Follow these instructions at home:  Alcohol use    · Do not drink alcohol if:  ? Your health care provider tells you not to drink.  ? You are pregnant, may be pregnant, or are planning to become pregnant.  · If you drink alcohol:  ? Limit how much you use to:  § 0-1 drink a day for women.  § 0-2 drinks a day for men.  ? Be aware of how much alcohol is in your drink. In the [place] one drink equals one 12 oz bottle of beer (355 mL), one 5 oz glass of wine (148 mL), or one 1½ oz glass of hard liquor (44 mL).  Nutrition    · Eat a healthy diet. This includes:  ? Eating foods that are high in fiber, such as fresh fruits and vegetables, whole grains, and beans.  ? Limiting foods that are high in fat and processed sugars, such as fried or sweet foods.  General instructions  · Take over-the-counter and prescription medicines only as told by your health care provider.  · Do not use any products that contain nicotine or tobacco, such as cigarettes and e-cigarettes. These can keep blood from reaching damaged nerves. If you need help quitting, ask your health care provider.  · If you have diabetes, work closely with your health care provider to keep your blood sugar under control.  · If you have numbness in your feet:  ? Check every day for signs of injury or infection. Watch for redness, warmth, and swelling.  ? Wear padded socks and comfortable shoes. These help protect your feet.  · Keep all follow-up visits as told by your health care provider. This is important.  Contact a health care provider if you:  · Have paresthesia that gets worse or does not go away.  · Have a burning or prickling feeling that gets worse when you walk.  · Have pain, cramps, or dizziness.  · Develop a rash.  Get help right away if you:  · Feel weak.  · Have trouble walking or moving.  · Have problems with speech, understanding, or vision.  · Feel  confused.  · Cannot control your bladder or bowel movements.  · Have numbness after an injury.  · Develop new weakness in an arm or leg.  · Faint.  Summary  · Paresthesia is an abnormal burning or prickling sensation that is usually felt in the hands, arms, legs, or feet. It may also occur in other parts of the body.  · Paresthesia may occur without any clear cause, or it may be caused by breathing too quickly (hyperventilation), pressure on a nerve, an underlying medical condition, side effects of a medication, nutritional deficiencies, or exposure to toxic chemicals.  · If you have paresthesia that lasts a long time, you may need to be evaluated by your health care provider.  This information is not intended to replace advice given to you by your health care provider. Make sure you discuss any questions you have with your health care provider.  Document Released: 12/08/2003 Document Revised: 01/13/2020 Document Reviewed: 12/27/2018  [employer] Interactive Patient Education © 2020 [employer]

## 2020-04-28 ENCOUNTER — RESULTS ENCOUNTER (OUTPATIENT)
Dept: FAMILY MEDICINE CLINIC | Facility: CLINIC | Age: 65
End: 2020-04-28

## 2020-04-28 DIAGNOSIS — I10 ESSENTIAL HYPERTENSION: ICD-10-CM

## 2020-04-28 DIAGNOSIS — E78.2 HYPERLIPEMIA, MIXED: ICD-10-CM

## 2020-04-28 DIAGNOSIS — R73.09 ABNORMAL GLUCOSE: ICD-10-CM

## 2020-05-01 ENCOUNTER — TELEMEDICINE (OUTPATIENT)
Dept: FAMILY MEDICINE CLINIC | Facility: CLINIC | Age: 65
End: 2020-05-01

## 2020-05-01 DIAGNOSIS — R73.03 PREDIABETES: ICD-10-CM

## 2020-05-01 DIAGNOSIS — G54.0 BRACHIAL PLEXUS NEUROPATHY: ICD-10-CM

## 2020-05-01 DIAGNOSIS — M25.512 CHRONIC LEFT SHOULDER PAIN: Primary | ICD-10-CM

## 2020-05-01 DIAGNOSIS — M25.512 ARTHRALGIA OF LEFT ACROMIOCLAVICULAR JOINT: ICD-10-CM

## 2020-05-01 DIAGNOSIS — G89.29 CHRONIC LEFT SHOULDER PAIN: Primary | ICD-10-CM

## 2020-05-01 DIAGNOSIS — I10 ESSENTIAL HYPERTENSION: ICD-10-CM

## 2020-05-01 DIAGNOSIS — E78.2 HYPERLIPEMIA, MIXED: ICD-10-CM

## 2020-05-01 PROCEDURE — 99214 OFFICE O/P EST MOD 30 MIN: CPT | Performed by: FAMILY MEDICINE

## 2020-05-01 RX ORDER — LISINOPRIL 10 MG/1
TABLET ORAL
Qty: 180 TABLET | Refills: 3
Start: 2020-05-01 | End: 2020-07-08 | Stop reason: SDUPTHER
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
## 2020-05-05 ENCOUNTER — TELEPHONE (OUTPATIENT)
Dept: FAMILY MEDICINE CLINIC | Facility: CLINIC | Age: 65
End: 2020-05-05

## 2020-05-05 DIAGNOSIS — F17.210 CIGARETTE SMOKER: Primary | ICD-10-CM

## 2020-05-05 RX ORDER — BUPROPION HYDROCHLORIDE 150 MG/1
TABLET, EXTENDED RELEASE ORAL
Qty: 60 TABLET | Refills: 3 | Status: SHIPPED | OUTPATIENT
Start: 2020-05-05 | End: 2020-05-20 | Stop reason: SDUPTHER

## 2020-05-05 NOTE — TELEPHONE ENCOUNTER
Patient had appt on 5/1 with dr jeter, forgot to mention he is willing to take wellbutrin to quit smoking.

## 2020-05-05 NOTE — TELEPHONE ENCOUNTER
I have sent in Wellbutrin to [facility].  Have him start with 1 daily for 3 days and then increase to twice daily.  Please let him know he can call [phone] quit now to talk with a cigarette cessation counselor if he needs any assistance in developing a quit plan.  Please wish him the best of luck in quitting the cigarettes.

## 2020-05-20 DIAGNOSIS — F17.210 CIGARETTE SMOKER: ICD-10-CM

## 2020-05-20 DIAGNOSIS — S42.022S CLOSED DISPLACED FRACTURE OF SHAFT OF LEFT CLAVICLE, SEQUELA: ICD-10-CM

## 2020-05-20 DIAGNOSIS — G54.0 BRACHIAL PLEXUS NEUROPATHY: ICD-10-CM

## 2020-05-20 RX ORDER — GABAPENTIN 100 MG/1
CAPSULE ORAL
Qty: 630 CAPSULE | Refills: 1 | Status: SHIPPED | OUTPATIENT
Start: 2020-05-20 | End: 2020-09-10 | Stop reason: SDUPTHER

## 2020-05-20 RX ORDER — BUPROPION HYDROCHLORIDE 150 MG/1
TABLET, EXTENDED RELEASE ORAL
Qty: 180 TABLET | Refills: 1 | Status: SHIPPED | OUTPATIENT
Start: 2020-05-20 | End: 2020-09-10 | Stop reason: SDUPTHER

## 2020-05-29 ENCOUNTER — TELEPHONE (OUTPATIENT)
Dept: FAMILY MEDICINE CLINIC | Facility: CLINIC | Age: 65
End: 2020-05-29

## 2020-05-29 NOTE — TELEPHONE ENCOUNTER
Patient calling stating that the calcium sent to pharmacy is not covered. He wants to know if he can get over the counter Citra marialuisa Max plus vitamin d3 to replace instead, please advise.

## 2020-07-08 DIAGNOSIS — I10 ESSENTIAL HYPERTENSION: ICD-10-CM

## 2020-07-08 RX ORDER — LISINOPRIL 10 MG/1
TABLET ORAL
Qty: 180 TABLET | Refills: 3 | Status: SHIPPED | OUTPATIENT
Start: 2020-07-08 | End: 2020-11-03

## 2020-08-03 ENCOUNTER — OFFICE VISIT (OUTPATIENT)
Dept: FAMILY MEDICINE CLINIC | Facility: CLINIC | Age: 65
End: 2020-08-03

## 2020-08-03 VITALS
DIASTOLIC BLOOD PRESSURE: 82 MMHG | HEIGHT: 71 IN | SYSTOLIC BLOOD PRESSURE: 133 MMHG | OXYGEN SATURATION: 95 % | WEIGHT: 260 LBS | TEMPERATURE: 97.5 F | HEART RATE: 68 BPM | BODY MASS INDEX: 36.4 KG/M2

## 2020-08-03 DIAGNOSIS — G54.0 BRACHIAL PLEXUS NEUROPATHY: ICD-10-CM

## 2020-08-03 DIAGNOSIS — E78.2 HYPERLIPEMIA, MIXED: ICD-10-CM

## 2020-08-03 DIAGNOSIS — I10 ESSENTIAL HYPERTENSION: Primary | ICD-10-CM

## 2020-08-03 DIAGNOSIS — E66.01 CLASS 2 SEVERE OBESITY DUE TO EXCESS CALORIES WITH SERIOUS COMORBIDITY AND BODY MASS INDEX (BMI) OF 36.0 TO 36.9 IN ADULT (HCC): ICD-10-CM

## 2020-08-03 DIAGNOSIS — Z82.3 FAMILY HISTORY OF STROKE: ICD-10-CM

## 2020-08-03 DIAGNOSIS — Z23 NEED FOR DIPHTHERIA-TETANUS-PERTUSSIS (TDAP) VACCINE: ICD-10-CM

## 2020-08-03 DIAGNOSIS — K43.9 VENTRAL HERNIA WITHOUT OBSTRUCTION OR GANGRENE: ICD-10-CM

## 2020-08-03 PROCEDURE — 90471 IMMUNIZATION ADMIN: CPT | Performed by: FAMILY MEDICINE

## 2020-08-03 PROCEDURE — 99214 OFFICE O/P EST MOD 30 MIN: CPT | Performed by: FAMILY MEDICINE

## 2020-08-03 PROCEDURE — 90715 TDAP VACCINE 7 YRS/> IM: CPT | Performed by: FAMILY MEDICINE

## 2020-08-03 NOTE — PROGRESS NOTES
Chief Complaint   Patient presents with   • Hypertension   • Hyperlipidemia       Subjective     James Cleveland is a 65 y.o. male.  Patient here to follow up on HTN and cholesterol, patient states to check at home, no readings with him, states it runs around 138/80.     He reports concerns regarding the COVID 19 pandemic, he is worried and afraid to be around his  4 month and 4 day old grandsons.     Quit smoking 1 1/2 weeks ago, did have one cigar 2 days ago. Still dip tobacco occaionaly.  He is motivated to stay off of cigarettes, stating he wants to be around for his grandchildren.    Do not drink alcohol except rarely maybe 4 margarettas in last 2 years.     The following portions of the patient's history were reviewed and updated as appropriate: allergies, current medications, past family history, past medical history, past social history, past surgical history and problem list.    Current Outpatient Medications on File Prior to Visit   Medication Sig   • atorvastatin (LIPITOR) 10 MG tablet Take 1 tablet by mouth Daily.   • buPROPion SR (Wellbutrin SR) 150 MG 12 hr tablet 1 daily for 3 days then increase to twice daily   • Calcium Carb-Cholecalciferol (Os-Agusto Calcium + D3) 500-200 MG-UNIT tablet Take 1 tablet by mouth 2 (Two) Times a Day.   • cetirizine (ZYRTEC ALLERGY) 10 MG tablet Take 1 tablet by mouth Daily.   • cyclobenzaprine (FLEXERIL) 10 MG tablet 1/2 to 1 tablet 3 times daily as needed for muscle spasm   • furosemide (LASIX) 20 MG tablet Take 0.5 tablets by mouth Daily.   • gabapentin (NEURONTIN) 100 MG capsule 1 to 2 tablets twice daily and 3 tablets at at bedtime   • Glucosamine-Chondroitin 166.7-133.3 MG capsule Take 1 tablet by mouth Daily.   • lisinopril (PRINIVIL,ZESTRIL) 10 MG tablet bid   • [DISCONTINUED] FLUoxetine (PROZAC) 20 MG capsule Take 1 capsule by mouth Daily.   • [DISCONTINUED] meclizine (ANTIVERT) 25 MG tablet Take 1 tablet by mouth 3 (Three) Times a Day As Needed for Dizziness.  "    No current facility-administered medications on file prior to visit.      Medications Discontinued During This Encounter   Medication Reason   • FLUoxetine (PROZAC) 20 MG capsule *Therapy completed   • meclizine (ANTIVERT) 25 MG tablet *Therapy completed       Review of Systems   Constitutional: Negative for appetite change, fatigue, fever and unexpected weight loss.   HENT: Negative for congestion.    Eyes: Negative for visual disturbance.   Respiratory: Negative for cough, shortness of breath and wheezing.    Cardiovascular: Negative for chest pain, palpitations and leg swelling.   Gastrointestinal: Negative for abdominal pain, constipation, diarrhea, nausea, vomiting and indigestion.   Musculoskeletal: Positive for arthralgias (left Shoulder pain and right foot and ankle   ).   Skin: Negative for rash.   Neurological: Negative for numbness and headache.   Psychiatric/Behavioral: Negative for sleep disturbance, suicidal ideas and depressed mood (improved on welbutrin). The patient is not nervous/anxious.         Objective   Vitals:    08/03/20 0830 08/03/20 0933   BP: 141/89 133/82   BP Location: Left arm Left arm   Patient Position: Sitting Sitting   Cuff Size: Adult Adult   Pulse: 68    Temp: 97.5 °F (36.4 °C)    TempSrc: Infrared    SpO2: 95%    Weight: 118 kg (260 lb)    Height: 180.3 cm (70.98\")       Body mass index is 36.28 kg/m².    Physical Exam   Constitutional: He is oriented to person, place, and time. He appears well-developed and well-nourished. No distress.   HENT:   Head: Normocephalic and atraumatic.   Eyes: Pupils are equal, round, and reactive to light. EOM are normal.   Cardiovascular: Regular rhythm.   No murmur heard.  Pulmonary/Chest: Effort normal and breath sounds normal. He has no wheezes. He has no rales.   Abdominal: Soft. Bowel sounds are normal.   Supraumbilical hernia somewhat tender to palpation   Musculoskeletal:   Varicosities of lower extremities with 1+ nonpitting edema.   "   Neurological: He is alert and oriented to person, place, and time.   Skin: Skin is warm and dry. No rash noted.   Psychiatric: He has a normal mood and affect.   Nursing note and vitals reviewed.    No visits with results within 3 Month(s) from this visit.   Latest known visit with results is:   Results Encounter on 02/04/2020   Component Date Value Ref Range Status   • Glucose 02/04/2020 98  65 - 99 mg/dL Final   • BUN 02/04/2020 18  8 - 27 mg/dL Final   • Creatinine 02/04/2020 0.96  0.76 - 1.27 mg/dL Final   • eGFR Non African Am 02/04/2020 84  >59 mL/min/1.73 Final   • eGFR African Am 02/04/2020 97  >59 mL/min/1.73 Final   • BUN/Creatinine Ratio 02/04/2020 19  10 - 24 Final   • Sodium 02/04/2020 142  134 - 144 mmol/L Final   • Potassium 02/04/2020 4.7  3.5 - 5.2 mmol/L Final   • Chloride 02/04/2020 102  96 - 106 mmol/L Final   • Total CO2 02/04/2020 24  20 - 29 mmol/L Final   • Calcium 02/04/2020 9.9  8.6 - 10.2 mg/dL Final   • Total Protein 02/04/2020 6.9  6.0 - 8.5 g/dL Final   • Albumin 02/04/2020 4.9* 3.8 - 4.8 g/dL Final                  **Please note reference interval change**   • Globulin 02/04/2020 2.0  1.5 - 4.5 g/dL Final   • A/G Ratio 02/04/2020 2.5* 1.2 - 2.2 Final   • Total Bilirubin 02/04/2020 0.5  0.0 - 1.2 mg/dL Final   • Alkaline Phosphatase 02/04/2020 64  39 - 117 IU/L Final   • AST (SGOT) 02/04/2020 17  0 - 40 IU/L Final   • ALT (SGPT) 02/04/2020 14  0 - 44 IU/L Final   • Total Cholesterol 02/04/2020 195  100 - 199 mg/dL Final   • Triglycerides 02/04/2020 104  0 - 149 mg/dL Final   • HDL Cholesterol 02/04/2020 42  >39 mg/dL Final   • VLDL Cholesterol 02/04/2020 21  5 - 40 mg/dL Final   • LDL Cholesterol  02/04/2020 132* 0 - 99 mg/dL Final   • PSA 02/04/2020 0.7  0.0 - 4.0 ng/mL Final    Comment: Roche ECLIA methodology.  According to the American Urological Association, Serum PSA should  decrease and remain at undetectable levels after radical  prostatectomy. The AUA defines biochemical  recurrence as an initial  PSA value 0.2 ng/mL or greater followed by a subsequent confirmatory  PSA value 0.2 ng/mL or greater.  Values obtained with different assay methods or kits cannot be used  interchangeably. Results cannot be interpreted as absolute evidence  of the presence or absence of malignant disease.     • Hemoglobin A1C 02/04/2020 5.7* 4.8 - 5.6 % Final    Comment:          Prediabetes: 5.7 - 6.4           Diabetes: >6.4           Glycemic control for adults with diabetes: <7.0             Lab Results   Component Value Date    HGBA1C 5.7 (H) 02/04/2020      The 10-year ASCVD risk score (Star MAR Jr., et al., 2013) is: 15.9%    Values used to calculate the score:      Age: 65 years      Sex: Male      Is Non- : No      Diabetic: No      Tobacco smoker: No      Systolic Blood Pressure: 133 mmHg      Is BP treated: Yes      HDL Cholesterol: 42 mg/dL      Total Cholesterol: 195 mg/dL    Procedures     Assessment/Plan   Diagnoses and all orders for this visit:    1. Essential hypertension (Primary)    2. Hyperlipemia, mixed    3. Class 2 severe obesity due to excess calories with serious comorbidity and body mass index (BMI) of 36.0 to 36.9 in adult (CMS/[facility])    4. Brachial plexus neuropathy    5. Family history of stroke    6. Ventral hernia without obstruction or gangrene  -     Ambulatory Referral to General Surgery    7. Need for diphtheria-tetanus-pertussis (Tdap) vaccine  -     Tdap Vaccine Greater Than or Equal To 8yo IM    Hypertension, initial reading above goal, patient had just taken medication before coming in, improved after sitting for a few minutes.  Patient states he believes it will continue to improve since he has stopped cigarette use.  He declines increase in medication today.    Lipid panel, A1c, CMP today as previously ordered.    May consider weaning and stopping Welbutrin in Spring, encouraged to stop using smokeless tobacco as well.    Did discuss respiratory  precautions to reduce risk of nilson and sharing COVID-19 virus.  Could consider quarantining himself for 2 weeks prior to seeing his grandchildren to bring potential risk of exposure closer to 0.    Did discuss need for Tdap vaccination agrees to take today.  Medications Discontinued During This Encounter   Medication Reason   • FLUoxetine (PROZAC) 20 MG capsule *Therapy completed   • meclizine (ANTIVERT) 25 MG tablet *Therapy completed          Return in about 3 months (around 11/3/2020) for Recheck, htn, elevated A1c return in October for flu vaccination.        AVS given with information on Dash eating plan

## 2020-08-03 NOTE — PATIENT INSTRUCTIONS
"DASH Eating Plan  DASH stands for \"Dietary Approaches to Stop Hypertension.\" The DASH eating plan is a healthy eating plan that has been shown to reduce high blood pressure (hypertension). It may also reduce your risk for type 2 diabetes, heart disease, and stroke. The DASH eating plan may also help with weight loss.  What are tips for following this plan?    General guidelines  · Avoid eating more than 2,300 mg (milligrams) of salt (sodium) a day. If you have hypertension, you may need to reduce your sodium intake to 1,500 mg a day.  · Limit alcohol intake to no more than 1 drink a day for nonpregnant women and 2 drinks a day for men. One drink equals 12 oz of beer, 5 oz of wine, or 1½ oz of hard liquor.  · Work with your health care provider to maintain a healthy body weight or to lose weight. Ask what an ideal weight is for you.  · Get at least 30 minutes of exercise that causes your heart to beat faster (aerobic exercise) most days of the week. Activities may include walking, swimming, or biking.  · Work with your health care provider or diet and nutrition specialist (dietitian) to adjust your eating plan to your individual calorie needs.  Reading food labels    · Check food labels for the amount of sodium per serving. Choose foods with less than 5 percent of the Daily Value of sodium. Generally, foods with less than 300 mg of sodium per serving fit into this eating plan.  · To find whole grains, look for the word \"whole\" as the first word in the ingredient list.  Shopping  · Buy products labeled as \"low-sodium\" or \"no salt added.\"  · Buy fresh foods. Avoid canned foods and premade or frozen meals.  Cooking  · Avoid adding salt when cooking. Use salt-free seasonings or herbs instead of table salt or sea salt. Check with your health care provider or pharmacist before using salt substitutes.  · Do not wei foods. Cook foods using healthy methods such as baking, boiling, grilling, and broiling instead.  · Cook with " heart-healthy oils, such as olive, canola, soybean, or sunflower oil.  Meal planning  · Eat a balanced diet that includes:  ? 5 or more servings of fruits and vegetables each day. At each meal, try to fill half of your plate with fruits and vegetables.  ? Up to 6-8 servings of whole grains each day.  ? Less than 6 oz of lean meat, poultry, or fish each day. A 3-oz serving of meat is about the same size as a deck of cards. One egg equals 1 oz.  ? 2 servings of low-fat dairy each day.  ? A serving of nuts, seeds, or beans 5 times each week.  ? Heart-healthy fats. Healthy fats called Omega-3 fatty acids are found in foods such as flaxseeds and coldwater fish, like sardines, salmon, and mackerel.  · Limit how much you eat of the following:  ? Canned or prepackaged foods.  ? Food that is high in trans fat, such as fried foods.  ? Food that is high in saturated fat, such as fatty meat.  ? Sweets, desserts, sugary drinks, and other foods with added sugar.  ? Full-fat dairy products.  · Do not salt foods before eating.  · Try to eat at least 2 vegetarian meals each week.  · Eat more home-cooked food and less restaurant, buffet, and fast food.  · When eating at a restaurant, ask that your food be prepared with less salt or no salt, if possible.  What foods are recommended?  The items listed may not be a complete list. Talk with your dietitian about what dietary choices are best for you.  Grains  Whole-grain or whole-wheat bread. Whole-grain or whole-wheat pasta. Brown rice. Oatmeal. Quinoa. Bulgur. Whole-grain and low-sodium cereals. Mary bread. Low-fat, low-sodium crackers. Whole-wheat flour tortillas.  Vegetables  Fresh or frozen vegetables (raw, steamed, roasted, or grilled). Low-sodium or reduced-sodium tomato and vegetable juice. Low-sodium or reduced-sodium tomato sauce and tomato paste. Low-sodium or reduced-sodium canned vegetables.  Fruits  All fresh, dried, or frozen fruit. Canned fruit in natural juice (without  added sugar).  Meat and other protein foods  Skinless chicken or turkey. Ground chicken or turkey. Pork with fat trimmed off. Fish and seafood. Egg whites. Dried beans, peas, or lentils. Unsalted nuts, nut butters, and seeds. Unsalted canned beans. Lean cuts of beef with fat trimmed off. Low-sodium, lean deli meat.  Dairy  Low-fat (1%) or fat-free (skim) milk. Fat-free, low-fat, or reduced-fat cheeses. Nonfat, low-sodium ricotta or cottage cheese. Low-fat or nonfat yogurt. Low-fat, low-sodium cheese.  Fats and oils  Soft margarine without trans fats. Vegetable oil. Low-fat, reduced-fat, or light mayonnaise and salad dressings (reduced-sodium). Canola, safflower, olive, soybean, and sunflower oils. Avocado.  Seasoning and other foods  Herbs. Spices. Seasoning mixes without salt. Unsalted popcorn and pretzels. Fat-free sweets.  What foods are not recommended?  The items listed may not be a complete list. Talk with your dietitian about what dietary choices are best for you.  Grains  Baked goods made with fat, such as croissants, muffins, or some breads. Dry pasta or rice meal packs.  Vegetables  Creamed or fried vegetables. Vegetables in a cheese sauce. Regular canned vegetables (not low-sodium or reduced-sodium). Regular canned tomato sauce and paste (not low-sodium or reduced-sodium). Regular tomato and vegetable juice (not low-sodium or reduced-sodium). Pickles. Olives.  Fruits  Canned fruit in a light or heavy syrup. Fried fruit. Fruit in cream or butter sauce.  Meat and other protein foods  Fatty cuts of meat. Ribs. Fried meat. Osorio. Sausage. Bologna and other processed lunch meats. Salami. Fatback. Hotdogs. Bratwurst. Salted nuts and seeds. Canned beans with added salt. Canned or smoked fish. Whole eggs or egg yolks. Chicken or turkey with skin.  Dairy  Whole or 2% milk, cream, and half-and-half. Whole or full-fat cream cheese. Whole-fat or sweetened yogurt. Full-fat cheese. Nondairy creamers. Whipped toppings.  Processed cheese and cheese spreads.  Fats and oils  Butter. Stick margarine. Lard. Shortening. Ghee. Osorio fat. Tropical oils, such as coconut, palm kernel, or palm oil.  Seasoning and other foods  Salted popcorn and pretzels. Onion salt, garlic salt, seasoned salt, table salt, and sea salt. Worcestershire sauce. Tartar sauce. Barbecue sauce. Teriyaki sauce. Soy sauce, including reduced-sodium. Steak sauce. Canned and packaged gravies. Fish sauce. Oyster sauce. Cocktail sauce. Horseradish that you find on the shelf. Ketchup. Mustard. Meat flavorings and tenderizers. Bouillon cubes. Hot sauce and Tabasco sauce. Premade or packaged marinades. Premade or packaged taco seasonings. Relishes. Regular salad dressings.  Where to find more information:  · National Heart, Lung, and Blood [place]: www.nhlbi.nih.gov  · American Heart Association: www.heart.org  Summary  · The DASH eating plan is a healthy eating plan that has been shown to reduce high blood pressure (hypertension). It may also reduce your risk for type 2 diabetes, heart disease, and stroke.  · With the DASH eating plan, you should limit salt (sodium) intake to 2,300 mg a day. If you have hypertension, you may need to reduce your sodium intake to 1,500 mg a day.  · When on the DASH eating plan, aim to eat more fresh fruits and vegetables, whole grains, lean proteins, low-fat dairy, and heart-healthy fats.  · Work with your health care provider or diet and nutrition specialist (dietitian) to adjust your eating plan to your individual calorie needs.  This information is not intended to replace advice given to you by your health care provider. Make sure you discuss any questions you have with your health care provider.  Document Released: 12/06/2012 Document Revised: 11/30/2018 Document Reviewed: 12/11/2017  Elsevier Patient Education © 2020 [employer]      Mindfulness-Based Stress Reduction  Mindfulness-based stress reduction (MBSR) is a program that helps  people learn to practice mindfulness. Mindfulness is the practice of intentionally paying attention to the present moment. It can be learned and practiced through techniques such as education, breathing exercises, meditation, and yoga. MBSR includes several mindfulness techniques in one program.  MBSR works best when you understand the treatment, are willing to try new things, and can commit to spending time practicing what you learn. MBSR training may include learning about:  · How your emotions, thoughts, and reactions affect your body.  · New ways to respond to things that cause negative thoughts to start (triggers).  · How to notice your thoughts and let go of them.  · Practicing awareness of everyday things that you normally do without thinking.  · The techniques and goals of different types of meditation.  What are the benefits of MBSR?  MBSR can have many benefits, which include helping you to:  · Develop self-awareness. This refers to knowing and understanding yourself.  · Learn skills and attitudes that help you to participate in your own health care.  · Learn new ways to care for yourself.  · Be more accepting about how things are, and let things go.  · Be less judgmental and approach things with an open mind.  · Be patient with yourself and trust yourself more.  MBSR has also been shown to:  · Reduce negative emotions, such as depression and anxiety.  · Improve memory and focus.  · Change how you sense and approach pain.  · Boost your body's ability to fight infections.  · Help you connect better with other people.  · Improve your sense of well-being.  Follow these instructions at home:    · Find a local in-person or online MBSR program.  · Set aside some time regularly for mindfulness practice.  · Find a mindfulness practice that works best for you. This may include one or more of the following:  ? Meditation. Meditation involves focusing your mind on a certain thought or activity.  ? Breathing awareness  exercises. These help you to stay present by focusing on your breath.  ? Body scan. For this practice, you lie down and pay attention to each part of your body from head to toe. You can identify tension and soreness and intentionally relax parts of your body.  ? Yoga. Yoga involves stretching and breathing, and it can improve your ability to move and be flexible. It can also provide an experience of testing your body's limits, which can help you release stress.  ? Mindful eating. This way of eating involves focusing on the taste, texture, color, and smell of each bite of food. Because this slows down eating and helps you feel full sooner, it can be an important part of a weight-loss plan.  · Find a podcast or recording that provides guidance for breathing awareness, body scan, or meditation exercises. You can listen to these any time when you have a free moment to rest without distractions.  · Follow your treatment plan as told by your health care provider. This may include taking regular medicines and making changes to your diet or lifestyle as recommended.  How to practice mindfulness  To do a basic awareness exercise:  · Find a comfortable place to sit.  · Pay attention to the present moment. Observe your thoughts, feelings, and surroundings just as they are.  · Avoid placing judgment on yourself, your feelings, or your surroundings. Make note of any judgment that comes up, and let it go.  · Your mind may wander, and that is okay. Make note of when your thoughts drift, and return your attention to the present moment.  To do basic mindfulness meditation:  · Find a comfortable place to sit. This may include a stable chair or a firm floor cushion.  ? Sit upright with your back straight. Let your arms fall next to your side with your hands resting on your legs.  ? If sitting in a chair, rest your feet flat on the floor.  ? If sitting on a cushion, cross your legs in front of you.  · Keep your head in a neutral  position with your chin dropped slightly. Relax your jaw and rest the tip of your tongue on the roof of your mouth. Drop your gaze to the floor. You can close your eyes if you like.  · Breathe normally and pay attention to your breath. Feel the air moving in and out of your nose. Feel your belly expanding and relaxing with each breath.  · Your mind may wander, and that is okay. Make note of when your thoughts drift, and return your attention to your breath.  · Avoid placing judgment on yourself, your feelings, or your surroundings. Make note of any judgment or feelings that come up, let them go, and bring your attention back to your breath.  · When you are ready, lift your gaze or open your eyes. Pay attention to how your body feels after the meditation.  Where to find more information  You can find more information about MBSR from:  · Your health care provider.  · Community-based meditation centers or programs.  · Programs offered near you.  Summary  · Mindfulness-based stress reduction (MBSR) is a program that teaches you how to intentionally pay attention to the present moment. It is used with other treatments to help you cope better with daily stress, emotions, and pain.  · MBSR focuses on developing self-awareness, which allows you to respond to life stress without judgment or negative emotions.  · MBSR programs may involve learning different mindfulness practices, such as breathing exercises, meditation, yoga, body scan, or mindful eating. Find a mindfulness practice that works best for you, and set aside time for it on a regular basis.  This information is not intended to replace advice given to you by your health care provider. Make sure you discuss any questions you have with your health care provider.  Document Released: 04/26/2018 Document Revised: 11/30/2018 Document Reviewed: 04/26/2018  Elsevier Patient Education © 2020 [employer]

## 2020-08-04 LAB
ALBUMIN SERPL-MCNC: 4.8 G/DL (ref 3.8–4.8)
ALBUMIN/GLOB SERPL: 2 {RATIO} (ref 1.2–2.2)
ALP SERPL-CCNC: 80 IU/L (ref 39–117)
ALT SERPL-CCNC: 24 IU/L (ref 0–44)
AST SERPL-CCNC: 20 IU/L (ref 0–40)
BILIRUB SERPL-MCNC: 0.2 MG/DL (ref 0–1.2)
BUN SERPL-MCNC: 20 MG/DL (ref 8–27)
BUN/CREAT SERPL: 14 (ref 10–24)
CALCIUM SERPL-MCNC: 10.2 MG/DL (ref 8.6–10.2)
CHLORIDE SERPL-SCNC: 102 MMOL/L (ref 96–106)
CHOLEST SERPL-MCNC: 159 MG/DL (ref 100–199)
CO2 SERPL-SCNC: 21 MMOL/L (ref 20–29)
CREAT SERPL-MCNC: 1.41 MG/DL (ref 0.76–1.27)
GLOBULIN SER CALC-MCNC: 2.4 G/DL (ref 1.5–4.5)
GLUCOSE SERPL-MCNC: 105 MG/DL (ref 65–99)
HBA1C MFR BLD: 5.8 % (ref 4.8–5.6)
HDLC SERPL-MCNC: 49 MG/DL
LDLC SERPL CALC-MCNC: 94 MG/DL (ref 0–99)
POTASSIUM SERPL-SCNC: 5.1 MMOL/L (ref 3.5–5.2)
PROT SERPL-MCNC: 7.2 G/DL (ref 6–8.5)
SODIUM SERPL-SCNC: 140 MMOL/L (ref 134–144)
TRIGL SERPL-MCNC: 80 MG/DL (ref 0–149)
VLDLC SERPL CALC-MCNC: 16 MG/DL (ref 5–40)

## 2020-08-07 ENCOUNTER — OFFICE VISIT (OUTPATIENT)
Dept: FAMILY MEDICINE CLINIC | Facility: CLINIC | Age: 65
End: 2020-08-07

## 2020-08-07 VITALS
WEIGHT: 261 LBS | BODY MASS INDEX: 36.54 KG/M2 | DIASTOLIC BLOOD PRESSURE: 91 MMHG | HEIGHT: 71 IN | SYSTOLIC BLOOD PRESSURE: 134 MMHG | TEMPERATURE: 97.3 F | OXYGEN SATURATION: 94 % | HEART RATE: 84 BPM

## 2020-08-07 DIAGNOSIS — R10.30 LOWER ABDOMINAL PAIN: Primary | ICD-10-CM

## 2020-08-07 DIAGNOSIS — I89.0 LYMPHEDEMA: ICD-10-CM

## 2020-08-07 DIAGNOSIS — I10 ESSENTIAL HYPERTENSION: ICD-10-CM

## 2020-08-07 DIAGNOSIS — N28.9 ACUTE RENAL INSUFFICIENCY: ICD-10-CM

## 2020-08-07 DIAGNOSIS — N41.0 ACUTE PROSTATITIS: ICD-10-CM

## 2020-08-07 LAB
BILIRUB BLD-MCNC: NEGATIVE MG/DL
CLARITY, POC: CLEAR
COLOR UR: YELLOW
GLUCOSE UR STRIP-MCNC: NEGATIVE MG/DL
KETONES UR QL: NEGATIVE
LEUKOCYTE EST, POC: ABNORMAL
NITRITE UR-MCNC: NEGATIVE MG/ML
PH UR: 6.5 [PH] (ref 5–8)
PROT UR STRIP-MCNC: NEGATIVE MG/DL
RBC # UR STRIP: ABNORMAL /UL
SP GR UR: 1.02 (ref 1–1.03)
UROBILINOGEN UR QL: NORMAL

## 2020-08-07 PROCEDURE — 81003 URINALYSIS AUTO W/O SCOPE: CPT | Performed by: FAMILY MEDICINE

## 2020-08-07 PROCEDURE — 99214 OFFICE O/P EST MOD 30 MIN: CPT | Performed by: FAMILY MEDICINE

## 2020-08-07 RX ORDER — FUROSEMIDE 20 MG/1
10 TABLET ORAL EVERY OTHER DAY
Qty: 90 TABLET | Refills: 1 | Status: SHIPPED
Start: 2020-08-07 | End: 2020-09-10 | Stop reason: SDUPTHER

## 2020-08-07 RX ORDER — CIPROFLOXACIN 500 MG/1
500 TABLET, FILM COATED ORAL 2 TIMES DAILY
Qty: 42 TABLET | Refills: 0 | Status: SHIPPED | OUTPATIENT
Start: 2020-08-07 | End: 2020-08-14

## 2020-08-07 NOTE — PROGRESS NOTES
Chief Complaint   Patient presents with   • Abdominal Pain       Subjective     James Cleveland is a 65 y.o. male. Patient here for pain occuring below belly button, states it hurts to urinate, have a bowel movement, as well with coughing and just sitting. Started with a cough about 36 hours ago.    Patient denies any flank pain, fevers, hematuria, or change in color of urine.hesitancy worse at night, and reduced strength of flow.  Some nausea no vomiting, no change in bowel movements.  Concerned he could have torn a hernia or possibly his prostate.  He is having some pain in his lower back and in his groin and has noticed increased urinary urgency.    Did have prostatitis in 1998 felt like sitting on a grapfruit Dr [place] treated with antibiotics and again around 2000.  This does not feel quite the same but other than not having the feeling of sitting on a grapefruit is very similar.      The following portions of the patient's history were reviewed and updated as appropriate: allergies, current medications, past family history, past medical history, past social history, past surgical history and problem list.    Current Outpatient Medications on File Prior to Visit   Medication Sig   • buPROPion SR (Wellbutrin SR) 150 MG 12 hr tablet 1 daily for 3 days then increase to twice daily   • Calcium Carb-Cholecalciferol (Os-Agusto Calcium + D3) 500-200 MG-UNIT tablet Take 1 tablet by mouth 2 (Two) Times a Day.   • cetirizine (ZYRTEC ALLERGY) 10 MG tablet Take 1 tablet by mouth Daily.   • gabapentin (NEURONTIN) 100 MG capsule 1 to 2 tablets twice daily and 3 tablets at at bedtime   • lisinopril (PRINIVIL,ZESTRIL) 10 MG tablet bid     No current facility-administered medications on file prior to visit.      Medications Discontinued During This Encounter   Medication Reason   • Glucosamine-Chondroitin 166.7-133.3 MG capsule Alternate therapy   • cyclobenzaprine (FLEXERIL) 10 MG tablet *Therapy completed   • atorvastatin  "(LIPITOR) 10 MG tablet Side effects   • furosemide (LASIX) 20 MG tablet        Review of Systems   Constitutional: Negative for fatigue and fever.   Eyes: Negative for visual disturbance.   Respiratory: Negative for cough, shortness of breath and wheezing.    Cardiovascular: Negative for chest pain, palpitations and leg swelling.   Gastrointestinal: Positive for abdominal pain, nausea and rectal pain ( Particularly with defecation). Negative for abdominal distention, anal bleeding, blood in stool, constipation, diarrhea and vomiting.   Genitourinary: Positive for dysuria, frequency and nocturia. Negative for difficulty urinating, discharge, flank pain, hematuria, penile pain, scrotal swelling, testicular pain and urinary incontinence.   Skin: Negative for rash.   Psychiatric/Behavioral: Negative for depressed mood.        Objective   Vitals:    08/07/20 0912   BP: 134/91   BP Location: Right arm   Patient Position: Sitting   Cuff Size: Large Adult   Pulse: 84   Temp: 97.3 °F (36.3 °C)   TempSrc: Infrared   SpO2: 94%   Weight: 118 kg (261 lb)   Height: 180.3 cm (70.98\")      Body mass index is 36.42 kg/m².    Physical Exam   Constitutional: He is oriented to person, place, and time. He appears well-developed and well-nourished. No distress.   HENT:   Head: Normocephalic and atraumatic.   Mouth/Throat: Oropharynx is clear and moist. No oropharyngeal exudate.   Eyes: Pupils are equal, round, and reactive to light. EOM are normal.   Neck: Normal range of motion. Neck supple. No thyromegaly present.   Cardiovascular: Regular rhythm.   No murmur heard.  Pulmonary/Chest: Effort normal and breath sounds normal. He has no wheezes. He has no rales.   Abdominal: Soft. Bowel sounds are normal. He exhibits no distension and no mass. There is tenderness. There is no guarding. A hernia is present.   Obese, supraumbilical hernia somewhat tender to palpation unchanged from recent previous exam.    There is significant tenderness in " the left lower and particularly suprapubic region without rebound.  No inguinal hernias    There is no tenderness to percussion of the costovertebral angles or palpation of the thoracic or lumbosacral spine or paraspinous muscles.   Musculoskeletal: Normal range of motion.   Varicosities of lower extremities with 1+ nonpitting edema.     Neurological: He is alert and oriented to person, place, and time.   Skin: Skin is warm and dry. No rash noted.   Psychiatric: He has a normal mood and affect.   Nursing note and vitals reviewed.      Lab Results   Component Value Date    GLU 94 08/07/2020    BUN 15 08/07/2020    CREATININE 1.15 08/07/2020    EGFRIFNONA 67 08/07/2020    EGFRIFAFRI 77 08/07/2020     08/07/2020    K 4.8 08/07/2020     08/07/2020    CALCIUM 10.0 08/07/2020    ALBUMIN 4.4 08/07/2020    BILITOT 0.5 08/07/2020    ALKPHOS 84 08/07/2020    AST 15 08/07/2020    ALT 18 08/07/2020    CHLPL 159 08/03/2020    TRIG 80 08/03/2020    HDL 49 08/03/2020    VLDL 16 08/03/2020    LDL 94 08/03/2020    MICROALBUR 4.7 08/07/2020    WBC 10.9 (H) 08/07/2020    RBC 4.86 08/07/2020    HCT 45.5 08/07/2020    MCV 94 08/07/2020    MCH 31.7 08/07/2020      Lab Results   Component Value Date    HGBA1C 5.8 (H) 08/03/2020    HGBA1C 5.7 (H) 02/04/2020      Office Visit on 08/07/2020   Component Date Value Ref Range Status   • Color 08/07/2020 Yellow  Yellow, Straw, Dark Yellow, Jenelle Final   • Clarity, UA 08/07/2020 Clear  Clear Final   • Specific Gravity  08/07/2020 1.025  1.005 - 1.030 Final   • pH, Urine 08/07/2020 6.5  5.0 - 8.0 Final   • Leukocytes 08/07/2020 Trace* Negative Final   • Nitrite, UA 08/07/2020 Negative  Negative Final   • Protein, POC 08/07/2020 Negative  Negative mg/dL Final   • Glucose, UA 08/07/2020 Negative  Negative, 1000 mg/dL (3+) mg/dL Final   • Ketones, UA 08/07/2020 Negative  Negative Final   • Urobilinogen, UA 08/07/2020 Normal  Normal Final   • Bilirubin 08/07/2020 Negative  Negative Final    • Blood, UA 08/07/2020 Trace* Negative Final   • Glucose 08/07/2020 94  65 - 99 mg/dL Final   • BUN 08/07/2020 15  8 - 27 mg/dL Final   • Creatinine 08/07/2020 1.15  0.76 - 1.27 mg/dL Final   • eGFR Non  Am 08/07/2020 67  >59 mL/min/1.73 Final   • eGFR African Am 08/07/2020 77  >59 mL/min/1.73 Final   • BUN/Creatinine Ratio 08/07/2020 13  10 - 24 Final   • Sodium 08/07/2020 140  134 - 144 mmol/L Final   • Potassium 08/07/2020 4.8  3.5 - 5.2 mmol/L Final   • Chloride 08/07/2020 101  96 - 106 mmol/L Final   • Total CO2 08/07/2020 24  20 - 29 mmol/L Final   • Calcium 08/07/2020 10.0  8.6 - 10.2 mg/dL Final   • Total Protein 08/07/2020 6.9  6.0 - 8.5 g/dL Final   • Albumin 08/07/2020 4.4  3.8 - 4.8 g/dL Final   • Globulin 08/07/2020 2.5  1.5 - 4.5 g/dL Final   • A/G Ratio 08/07/2020 1.8  1.2 - 2.2 Final   • Total Bilirubin 08/07/2020 0.5  0.0 - 1.2 mg/dL Final   • Alkaline Phosphatase 08/07/2020 84  39 - 117 IU/L Final   • AST (SGOT) 08/07/2020 15  0 - 40 IU/L Final   • ALT (SGPT) 08/07/2020 18  0 - 44 IU/L Final   • WBC 08/07/2020 10.9* 3.4 - 10.8 x10E3/uL Final   • RBC 08/07/2020 4.86  4.14 - 5.80 x10E6/uL Final   • Hemoglobin 08/07/2020 15.4  13.0 - 17.7 g/dL Final   • Hematocrit 08/07/2020 45.5  37.5 - 51.0 % Final   • MCV 08/07/2020 94  79 - 97 fL Final   • MCH 08/07/2020 31.7  26.6 - 33.0 pg Final   • MCHC 08/07/2020 33.8  31.5 - 35.7 g/dL Final   • RDW 08/07/2020 13.3  11.6 - 15.4 % Final   • Platelets 08/07/2020 169  150 - 450 x10E3/uL Final   • Neutrophil Rel % 08/07/2020 71  Not Estab. % Final   • Lymphocyte Rel % 08/07/2020 18  Not Estab. % Final   • Monocyte Rel % 08/07/2020 9  Not Estab. % Final   • Eosinophil Rel % 08/07/2020 2  Not Estab. % Final   • Basophil Rel % 08/07/2020 0  Not Estab. % Final   • Neutrophils Absolute 08/07/2020 7.7* 1.4 - 7.0 x10E3/uL Final   • Lymphocytes Absolute 08/07/2020 1.9  0.7 - 3.1 x10E3/uL Final   • Monocytes Absolute 08/07/2020 0.9  0.1 - 0.9 x10E3/uL Final   •  Eosinophils Absolute 08/07/2020 0.3  0.0 - 0.4 x10E3/uL Final   • Basophils Absolute 08/07/2020 0.0  0.0 - 0.2 x10E3/uL Final   • Immature Granulocyte Rel % 08/07/2020 0  Not Estab. % Final   • Immature Grans Absolute 08/07/2020 0.0  0.0 - 0.1 x10E3/uL Final   • Microalbumin, Urine 08/07/2020 4.7  Not Estab. ug/mL Final   • Urine Culture 08/07/2020 Final report   Final   • Result 1 08/07/2020 No growth   Final         Procedures     Assessment/Plan   Diagnoses and all orders for this visit:    1. Lower abdominal pain (Primary)  -     POC Urinalysis Dipstick, Automated  -     Urine Culture - Urine, Urine, Clean Catch    2. Acute renal insufficiency  -     Comprehensive Metabolic Panel  -     Urine Culture - Urine, Urine, Clean Catch    3. Acute prostatitis  -     ciprofloxacin (Cipro) 500 MG tablet; Take 1 tablet by mouth 2 (Two) Times a Day for 21 days.  Dispense: 42 tablet; Refill: 0  -     Comprehensive Metabolic Panel  -     CBC & Differential    4. Lymphedema  -     furosemide (LASIX) 20 MG tablet; Take 0.5 tablets by mouth Every Other Day.  Dispense: 90 tablet; Refill: 1    5. Essential hypertension  -     MicroAlbumin, Urine, Random - Urine, Clean Catch      Lower abdominal pain, pain with defecation and urination differential included prostatitis or UTI, urine culture is negative and prescribed Cipro extended course to treat prostatitis.  CBC to check white count/response to infection.    New onset renal insufficiency, no evidence of renal obstruction, rechecking CMP today along with other labs as ordered, to ensure renal function is not worsening.  Reducing Lasix from 20 mg daily to half tablet every other day but advised patient can increase if swelling increases significantly.    Medications Discontinued During This Encounter   Medication Reason   • Glucosamine-Chondroitin 166.7-133.3 MG capsule Alternate therapy   • cyclobenzaprine (FLEXERIL) 10 MG tablet *Therapy completed   • atorvastatin (LIPITOR) 10  MG tablet Side effects   • furosemide (LASIX) 20 MG tablet           Return in about 3 months (around 11/3/2020) for as previously scheduled or as needed increase or new symptoms..        AVS given

## 2020-08-08 LAB
ALBUMIN SERPL-MCNC: 4.4 G/DL (ref 3.8–4.8)
ALBUMIN/GLOB SERPL: 1.8 {RATIO} (ref 1.2–2.2)
ALP SERPL-CCNC: 84 IU/L (ref 39–117)
ALT SERPL-CCNC: 18 IU/L (ref 0–44)
AST SERPL-CCNC: 15 IU/L (ref 0–40)
BASOPHILS # BLD AUTO: 0 X10E3/UL (ref 0–0.2)
BASOPHILS NFR BLD AUTO: 0 %
BILIRUB SERPL-MCNC: 0.5 MG/DL (ref 0–1.2)
BUN SERPL-MCNC: 15 MG/DL (ref 8–27)
BUN/CREAT SERPL: 13 (ref 10–24)
CALCIUM SERPL-MCNC: 10 MG/DL (ref 8.6–10.2)
CHLORIDE SERPL-SCNC: 101 MMOL/L (ref 96–106)
CO2 SERPL-SCNC: 24 MMOL/L (ref 20–29)
CREAT SERPL-MCNC: 1.15 MG/DL (ref 0.76–1.27)
EOSINOPHIL # BLD AUTO: 0.3 X10E3/UL (ref 0–0.4)
EOSINOPHIL NFR BLD AUTO: 2 %
ERYTHROCYTE [DISTWIDTH] IN BLOOD BY AUTOMATED COUNT: 13.3 % (ref 11.6–15.4)
GLOBULIN SER CALC-MCNC: 2.5 G/DL (ref 1.5–4.5)
GLUCOSE SERPL-MCNC: 94 MG/DL (ref 65–99)
HCT VFR BLD AUTO: 45.5 % (ref 37.5–51)
HGB BLD-MCNC: 15.4 G/DL (ref 13–17.7)
IMM GRANULOCYTES # BLD AUTO: 0 X10E3/UL (ref 0–0.1)
IMM GRANULOCYTES NFR BLD AUTO: 0 %
LYMPHOCYTES # BLD AUTO: 1.9 X10E3/UL (ref 0.7–3.1)
LYMPHOCYTES NFR BLD AUTO: 18 %
MCH RBC QN AUTO: 31.7 PG (ref 26.6–33)
MCHC RBC AUTO-ENTMCNC: 33.8 G/DL (ref 31.5–35.7)
MCV RBC AUTO: 94 FL (ref 79–97)
MONOCYTES # BLD AUTO: 0.9 X10E3/UL (ref 0.1–0.9)
MONOCYTES NFR BLD AUTO: 9 %
NEUTROPHILS # BLD AUTO: 7.7 X10E3/UL (ref 1.4–7)
NEUTROPHILS NFR BLD AUTO: 71 %
PLATELET # BLD AUTO: 169 X10E3/UL (ref 150–450)
POTASSIUM SERPL-SCNC: 4.8 MMOL/L (ref 3.5–5.2)
PROT SERPL-MCNC: 6.9 G/DL (ref 6–8.5)
RBC # BLD AUTO: 4.86 X10E6/UL (ref 4.14–5.8)
SODIUM SERPL-SCNC: 140 MMOL/L (ref 134–144)
WBC # BLD AUTO: 10.9 X10E3/UL (ref 3.4–10.8)

## 2020-08-09 LAB
BACTERIA UR CULT: NO GROWTH
BACTERIA UR CULT: NORMAL
MICROALBUMIN UR-MCNC: 4.7 UG/ML

## 2020-08-14 ENCOUNTER — TELEPHONE (OUTPATIENT)
Dept: FAMILY MEDICINE CLINIC | Facility: CLINIC | Age: 65
End: 2020-08-14

## 2020-08-14 RX ORDER — FLUCONAZOLE 150 MG/1
TABLET ORAL
Qty: 2 TABLET | Refills: 0 | Status: SHIPPED | OUTPATIENT
Start: 2020-08-14 | End: 2020-11-03

## 2020-08-14 RX ORDER — PRENATAL VIT 91/IRON/FOLIC/DHA 28-975-200
1 COMBINATION PACKAGE (EA) ORAL 2 TIMES DAILY
Qty: 60 G | Refills: 1 | Status: SHIPPED | OUTPATIENT
Start: 2020-08-14 | End: 2020-08-24

## 2020-08-14 NOTE — TELEPHONE ENCOUNTER
Called patient and he stated his abdominal pain was better on Monday and gone and then come Tuesday he broke out in a rash in his butt and states it's to point it hurts to walk or sit. Patient states has tried everything he can think of over counter like cortisone cream, and nothing is helping, I advised patient if worsening over weekend to probably go to an urgent care. He asked if maybe a steroid cream or something could be called in. Patient aware that no provider here today and if not better he will call on Monday

## 2020-08-14 NOTE — TELEPHONE ENCOUNTER
Pt called saying his bladder is still in extreme pain and wants to know what his next step should be.

## 2020-08-14 NOTE — TELEPHONE ENCOUNTER
Have contacted patient, abdominal/bladder/prostate pain has resolved and he has discontinued Cipro. He now has what sounds like a perianal candidiasis. I have sent in Cipro 150 to take one now and in 72 hours along with Lamisil Cream to apply twice daily until rash clears. Advised if not improving by Monday to contact office.Reminded not to restart Statin medication due to interaction with Diflucan.

## 2020-08-17 ENCOUNTER — OFFICE VISIT (OUTPATIENT)
Dept: SURGERY | Facility: CLINIC | Age: 65
End: 2020-08-17

## 2020-08-17 ENCOUNTER — PREP FOR SURGERY (OUTPATIENT)
Dept: OTHER | Facility: HOSPITAL | Age: 65
End: 2020-08-17

## 2020-08-17 VITALS
OXYGEN SATURATION: 97 % | BODY MASS INDEX: 34.46 KG/M2 | SYSTOLIC BLOOD PRESSURE: 140 MMHG | WEIGHT: 260 LBS | HEART RATE: 67 BPM | DIASTOLIC BLOOD PRESSURE: 82 MMHG | HEIGHT: 73 IN | TEMPERATURE: 98.4 F

## 2020-08-17 DIAGNOSIS — K43.9 VENTRAL HERNIA WITHOUT OBSTRUCTION OR GANGRENE: Primary | ICD-10-CM

## 2020-08-17 PROCEDURE — 99204 OFFICE O/P NEW MOD 45 MIN: CPT | Performed by: SURGERY

## 2020-08-17 RX ORDER — CLINDAMYCIN PHOSPHATE 900 MG/50ML
900 INJECTION INTRAVENOUS ONCE
Status: CANCELLED | OUTPATIENT
Start: 2020-08-17 | End: 2020-08-17

## 2020-08-17 NOTE — PROGRESS NOTES
"Subjective   James Cleveland is a 65 y.o. male.   Chief Complaint   Patient presents with   • Hernia     /82   Pulse 67   Temp 98.4 °F (36.9 °C) (Temporal)   Ht 185.4 cm (73\")   Wt 118 kg (260 lb)   SpO2 97%   BMI 34.30 kg/m²     HISTORY OF PRESENT ILLNESS:  Patient is a pleasant gentleman with a long standing history of a ventral hernia.  It used to reduce but now it does not.  It has gotten more tender, especially with straining now that it does not reduce.  He is now interested in repair secondary to the symptoms.      The following portions of the patient's history were reviewed and updated as appropriate: allergies, current medications, past family history, past medical history, past social history, past surgical history and problem list.    Review of Systems   Constitutional: Negative for activity change and chills.   HENT: Negative for sore throat and voice change.    Eyes: Negative for blurred vision.   Respiratory: Negative for chest tightness.    Cardiovascular: Negative for chest pain.   Gastrointestinal: Positive for abdominal pain.   Endocrine: Negative for cold intolerance.   Genitourinary: Negative for urgency.   Musculoskeletal: Negative for arthralgias.   Skin: Negative for color change.   Neurological: Negative for syncope and confusion.   Hematological: Does not bruise/bleed easily.   Psychiatric/Behavioral: Negative for hallucinations.       Objective   Physical Exam   Constitutional: He is oriented to person, place, and time. He appears well-developed and well-nourished.   HENT:   Head: Normocephalic and atraumatic.   Eyes: EOM are normal.   Neck: Normal range of motion.   Cardiovascular: Normal rate.   Pulmonary/Chest: Effort normal.   Abdominal: Soft.   Chronically incarcerated supraumbilical ventral hernia   Genitourinary:   Genitourinary Comments: Deferred   Musculoskeletal: Normal range of motion.   Neurological: He is alert and oriented to person, place, and time.   Skin: Skin is " warm and dry.   Psychiatric: He has a normal mood and affect.         Assessment/Plan   James was seen today for hernia.    Diagnoses and all orders for this visit:    Ventral hernia without obstruction or gangrene    Patient would benefit from hernia repair with mesh.  I think this would best be accomplished open.  We discussed the risk of general anesthesia, bleeding, infection and injury to surrounding structures.  Also discussed the use of mesh.  He is agreeable to proceed.    Echo Mendoza MD  8/17/2020  6:05 PM

## 2020-09-10 DIAGNOSIS — S42.022S CLOSED DISPLACED FRACTURE OF SHAFT OF LEFT CLAVICLE, SEQUELA: ICD-10-CM

## 2020-09-10 DIAGNOSIS — I89.0 LYMPHEDEMA: ICD-10-CM

## 2020-09-10 DIAGNOSIS — G54.0 BRACHIAL PLEXUS NEUROPATHY: ICD-10-CM

## 2020-09-10 DIAGNOSIS — F17.210 CIGARETTE SMOKER: ICD-10-CM

## 2020-09-10 RX ORDER — BUPROPION HYDROCHLORIDE 150 MG/1
TABLET, EXTENDED RELEASE ORAL
Qty: 180 TABLET | Refills: 1 | Status: SHIPPED | OUTPATIENT
Start: 2020-09-10 | End: 2020-10-26

## 2020-09-10 RX ORDER — GABAPENTIN 100 MG/1
CAPSULE ORAL
Qty: 630 CAPSULE | Refills: 1 | Status: SHIPPED | OUTPATIENT
Start: 2020-09-10 | End: 2020-12-28 | Stop reason: SDUPTHER

## 2020-09-10 RX ORDER — FUROSEMIDE 20 MG/1
10 TABLET ORAL EVERY OTHER DAY
Qty: 90 TABLET | Refills: 1
Start: 2020-09-10 | End: 2020-11-03

## 2020-09-17 ENCOUNTER — LAB (OUTPATIENT)
Dept: LAB | Facility: HOSPITAL | Age: 65
End: 2020-09-17

## 2020-09-17 ENCOUNTER — HOSPITAL ENCOUNTER (OUTPATIENT)
Dept: CARDIOLOGY | Facility: HOSPITAL | Age: 65
Discharge: HOME | End: 2020-09-17

## 2020-09-17 DIAGNOSIS — K43.9 VENTRAL HERNIA WITHOUT OBSTRUCTION OR GANGRENE: ICD-10-CM

## 2020-09-17 LAB
ANION GAP SERPL CALCULATED.3IONS-SCNC: 8.2 MMOL/L (ref 5–15)
BUN SERPL-MCNC: 20 MG/DL (ref 8–23)
BUN/CREAT SERPL: 17.2 (ref 7–25)
CALCIUM SPEC-SCNC: 9.7 MG/DL (ref 8.6–10.5)
CHLORIDE SERPL-SCNC: 104 MMOL/L (ref 98–107)
CO2 SERPL-SCNC: 26.8 MMOL/L (ref 22–29)
CREAT SERPL-MCNC: 1.16 MG/DL (ref 0.76–1.27)
DEPRECATED RDW RBC AUTO: 45.9 FL (ref 37–54)
ERYTHROCYTE [DISTWIDTH] IN BLOOD BY AUTOMATED COUNT: 13.5 % (ref 12.3–15.4)
GFR SERPL CREATININE-BSD FRML MDRD: 63 ML/MIN/1.73
GLUCOSE SERPL-MCNC: 89 MG/DL (ref 65–99)
HCT VFR BLD AUTO: 46 % (ref 37.5–51)
HGB BLD-MCNC: 15.4 G/DL (ref 13–17.7)
MCH RBC QN AUTO: 31.2 PG (ref 26.6–33)
MCHC RBC AUTO-ENTMCNC: 33.5 G/DL (ref 31.5–35.7)
MCV RBC AUTO: 93.3 FL (ref 79–97)
PLATELET # BLD AUTO: 169 10*3/MM3 (ref 140–450)
PMV BLD AUTO: 11.2 FL (ref 6–12)
POTASSIUM SERPL-SCNC: 4.7 MMOL/L (ref 3.5–5.2)
RBC # BLD AUTO: 4.93 10*6/MM3 (ref 4.14–5.8)
SODIUM SERPL-SCNC: 139 MMOL/L (ref 136–145)
WBC # BLD AUTO: 6.23 10*3/MM3 (ref 3.4–10.8)

## 2020-09-17 PROCEDURE — 80048 BASIC METABOLIC PNL TOTAL CA: CPT

## 2020-09-17 PROCEDURE — 93005 ELECTROCARDIOGRAM TRACING: CPT | Performed by: SURGERY

## 2020-09-17 PROCEDURE — 85027 COMPLETE CBC AUTOMATED: CPT

## 2020-09-19 PROCEDURE — 93010 ELECTROCARDIOGRAM REPORT: CPT | Performed by: INTERNAL MEDICINE

## 2020-09-22 ENCOUNTER — LAB (OUTPATIENT)
Dept: LAB | Facility: HOSPITAL | Age: 65
End: 2020-09-22

## 2020-09-22 PROCEDURE — U0004 COV-19 TEST NON-CDC HGH THRU: HCPCS

## 2020-09-22 PROCEDURE — C9803 HOPD COVID-19 SPEC COLLECT: HCPCS

## 2020-09-23 ENCOUNTER — ANESTHESIA EVENT (OUTPATIENT)
Dept: PERIOP | Facility: HOSPITAL | Age: 65
End: 2020-09-23

## 2020-09-23 LAB — SARS-COV-2 RNA NOSE QL NAA+PROBE: NOT DETECTED

## 2020-09-24 ENCOUNTER — HOSPITAL ENCOUNTER (OUTPATIENT)
Facility: HOSPITAL | Age: 65
Setting detail: HOSPITAL OUTPATIENT SURGERY
Discharge: HOME | End: 2020-09-24
Attending: SURGERY | Admitting: SURGERY

## 2020-09-24 ENCOUNTER — ANESTHESIA (OUTPATIENT)
Dept: PERIOP | Facility: HOSPITAL | Age: 65
End: 2020-09-24

## 2020-09-24 VITALS
OXYGEN SATURATION: 94 % | RESPIRATION RATE: 15 BRPM | DIASTOLIC BLOOD PRESSURE: 90 MMHG | SYSTOLIC BLOOD PRESSURE: 168 MMHG | BODY MASS INDEX: 35.03 KG/M2 | HEIGHT: 73 IN | TEMPERATURE: 96.8 F | WEIGHT: 264.33 LBS | HEART RATE: 71 BPM

## 2020-09-24 DIAGNOSIS — K43.9 VENTRAL HERNIA WITHOUT OBSTRUCTION OR GANGRENE: ICD-10-CM

## 2020-09-24 PROCEDURE — C1781 MESH (IMPLANTABLE): HCPCS | Performed by: SURGERY

## 2020-09-24 PROCEDURE — 25010000002 HYDROMORPHONE PER 4 MG: Performed by: ANESTHESIOLOGY

## 2020-09-24 PROCEDURE — 49568 PR IMPLANT MESH HERNIA REPAIR/DEBRIDEMENT CLOSURE: CPT | Performed by: SURGERY

## 2020-09-24 PROCEDURE — 25010000002 FENTANYL CITRATE (PF) 100 MCG/2ML SOLUTION: Performed by: ANESTHESIOLOGY

## 2020-09-24 PROCEDURE — 49561 PR REPAIR INCISIONAL HERNIA,STRANG: CPT | Performed by: PHYSICIAN ASSISTANT

## 2020-09-24 PROCEDURE — 25010000002 MIDAZOLAM PER 1 MG: Performed by: ANESTHESIOLOGY

## 2020-09-24 PROCEDURE — 49561 PR REPAIR INCISIONAL HERNIA,STRANG: CPT | Performed by: SURGERY

## 2020-09-24 PROCEDURE — 25010000002 ONDANSETRON PER 1 MG: Performed by: ANESTHESIOLOGY

## 2020-09-24 PROCEDURE — 49568 PR IMPLANT MESH HERNIA REPAIR/DEBRIDEMENT CLOSURE: CPT | Performed by: PHYSICIAN ASSISTANT

## 2020-09-24 PROCEDURE — 25010000002 PROPOFOL 10 MG/ML EMULSION: Performed by: ANESTHESIOLOGY

## 2020-09-24 DEVICE — VENTRALEX ST HERNIA PATCH
Type: IMPLANTABLE DEVICE | Site: ABDOMEN | Status: FUNCTIONAL
Brand: VENTRALEX ST HERNIA PATCH

## 2020-09-24 RX ORDER — PROPOFOL 10 MG/ML
VIAL (ML) INTRAVENOUS AS NEEDED
Status: DISCONTINUED | OUTPATIENT
Start: 2020-09-24 | End: 2020-09-24 | Stop reason: SURG

## 2020-09-24 RX ORDER — ONDANSETRON 2 MG/ML
4 INJECTION INTRAMUSCULAR; INTRAVENOUS ONCE AS NEEDED
Status: DISCONTINUED | OUTPATIENT
Start: 2020-09-24 | End: 2020-09-24 | Stop reason: HOSPADM

## 2020-09-24 RX ORDER — SODIUM CHLORIDE 0.9 % (FLUSH) 0.9 %
10 SYRINGE (ML) INJECTION EVERY 12 HOURS SCHEDULED
Status: DISCONTINUED | OUTPATIENT
Start: 2020-09-24 | End: 2020-09-24 | Stop reason: HOSPADM

## 2020-09-24 RX ORDER — HYDROMORPHONE HCL 110MG/55ML
0.5 PATIENT CONTROLLED ANALGESIA SYRINGE INTRAVENOUS
Status: DISCONTINUED | OUTPATIENT
Start: 2020-09-24 | End: 2020-09-24 | Stop reason: HOSPADM

## 2020-09-24 RX ORDER — ROCURONIUM BROMIDE 10 MG/ML
INJECTION, SOLUTION INTRAVENOUS AS NEEDED
Status: DISCONTINUED | OUTPATIENT
Start: 2020-09-24 | End: 2020-09-24 | Stop reason: SURG

## 2020-09-24 RX ORDER — MIDAZOLAM HYDROCHLORIDE 1 MG/ML
INJECTION INTRAMUSCULAR; INTRAVENOUS AS NEEDED
Status: DISCONTINUED | OUTPATIENT
Start: 2020-09-24 | End: 2020-09-24 | Stop reason: SURG

## 2020-09-24 RX ORDER — SODIUM CHLORIDE, SODIUM LACTATE, POTASSIUM CHLORIDE, CALCIUM CHLORIDE 600; 310; 30; 20 MG/100ML; MG/100ML; MG/100ML; MG/100ML
9 INJECTION, SOLUTION INTRAVENOUS CONTINUOUS PRN
Status: DISCONTINUED | OUTPATIENT
Start: 2020-09-24 | End: 2020-09-24 | Stop reason: HOSPADM

## 2020-09-24 RX ORDER — OXYCODONE HYDROCHLORIDE AND ACETAMINOPHEN 5; 325 MG/1; MG/1
1 TABLET ORAL EVERY 6 HOURS PRN
Qty: 30 TABLET | Refills: 0 | Status: SHIPPED | OUTPATIENT
Start: 2020-09-24 | End: 2020-11-03

## 2020-09-24 RX ORDER — LIDOCAINE HYDROCHLORIDE AND EPINEPHRINE 10; 10 MG/ML; UG/ML
INJECTION, SOLUTION INFILTRATION; PERINEURAL AS NEEDED
Status: DISCONTINUED | OUTPATIENT
Start: 2020-09-24 | End: 2020-09-24 | Stop reason: HOSPADM

## 2020-09-24 RX ORDER — NEOSTIGMINE METHYLSULFATE 5 MG/5 ML
SYRINGE (ML) INTRAVENOUS AS NEEDED
Status: DISCONTINUED | OUTPATIENT
Start: 2020-09-24 | End: 2020-09-24 | Stop reason: SURG

## 2020-09-24 RX ORDER — GLYCOPYRROLATE 1 MG/5 ML
SYRINGE (ML) INTRAVENOUS AS NEEDED
Status: DISCONTINUED | OUTPATIENT
Start: 2020-09-24 | End: 2020-09-24 | Stop reason: SURG

## 2020-09-24 RX ORDER — CLINDAMYCIN PHOSPHATE 900 MG/50ML
900 INJECTION, SOLUTION INTRAVENOUS ONCE
Status: COMPLETED | OUTPATIENT
Start: 2020-09-24 | End: 2020-09-24

## 2020-09-24 RX ORDER — PHENYLEPHRINE HCL IN 0.9% NACL 0.5 MG/5ML
SYRINGE (ML) INTRAVENOUS AS NEEDED
Status: DISCONTINUED | OUTPATIENT
Start: 2020-09-24 | End: 2020-09-24 | Stop reason: SURG

## 2020-09-24 RX ORDER — ONDANSETRON 2 MG/ML
INJECTION INTRAMUSCULAR; INTRAVENOUS AS NEEDED
Status: DISCONTINUED | OUTPATIENT
Start: 2020-09-24 | End: 2020-09-24 | Stop reason: SURG

## 2020-09-24 RX ORDER — FENTANYL CITRATE 50 UG/ML
INJECTION, SOLUTION INTRAMUSCULAR; INTRAVENOUS AS NEEDED
Status: DISCONTINUED | OUTPATIENT
Start: 2020-09-24 | End: 2020-09-24 | Stop reason: SURG

## 2020-09-24 RX ORDER — FENTANYL CITRATE 50 UG/ML
50 INJECTION, SOLUTION INTRAMUSCULAR; INTRAVENOUS
Status: DISCONTINUED | OUTPATIENT
Start: 2020-09-24 | End: 2020-09-24 | Stop reason: HOSPADM

## 2020-09-24 RX ORDER — SODIUM CHLORIDE 0.9 % (FLUSH) 0.9 %
10 SYRINGE (ML) INJECTION AS NEEDED
Status: DISCONTINUED | OUTPATIENT
Start: 2020-09-24 | End: 2020-09-24 | Stop reason: HOSPADM

## 2020-09-24 RX ADMIN — FENTANYL CITRATE 50 MCG: 50 INJECTION, SOLUTION INTRAMUSCULAR; INTRAVENOUS at 12:06

## 2020-09-24 RX ADMIN — Medication 0.5 MG: at 12:48

## 2020-09-24 RX ADMIN — SODIUM CHLORIDE, SODIUM LACTATE, POTASSIUM CHLORIDE, AND CALCIUM CHLORIDE 9 ML/HR: 600; 310; 30; 20 INJECTION, SOLUTION INTRAVENOUS at 10:53

## 2020-09-24 RX ADMIN — PROPOFOL 200 MG: 10 INJECTION, EMULSION INTRAVENOUS at 12:09

## 2020-09-24 RX ADMIN — ROCURONIUM BROMIDE 40 MG: 10 INJECTION, SOLUTION INTRAVENOUS at 12:09

## 2020-09-24 RX ADMIN — ONDANSETRON 4 MG: 2 INJECTION INTRAMUSCULAR; INTRAVENOUS at 12:47

## 2020-09-24 RX ADMIN — CLINDAMYCIN PHOSPHATE 900 MG: 900 INJECTION, SOLUTION INTRAVENOUS at 12:15

## 2020-09-24 RX ADMIN — PHENYLEPHRINE HYDROCHLORIDE 100 MCG: 10 INJECTION INTRAVENOUS at 12:19

## 2020-09-24 RX ADMIN — MIDAZOLAM 2 MG: 1 INJECTION INTRAMUSCULAR; INTRAVENOUS at 12:06

## 2020-09-24 RX ADMIN — PHENYLEPHRINE HYDROCHLORIDE 100 MCG: 10 INJECTION INTRAVENOUS at 12:18

## 2020-09-24 RX ADMIN — PHENYLEPHRINE HYDROCHLORIDE 100 MCG: 10 INJECTION INTRAVENOUS at 12:22

## 2020-09-24 RX ADMIN — HYDROMORPHONE HYDROCHLORIDE 0.5 MG: 2 INJECTION, SOLUTION INTRAMUSCULAR; INTRAVENOUS; SUBCUTANEOUS at 13:26

## 2020-09-24 RX ADMIN — FENTANYL CITRATE 50 MCG: 50 INJECTION, SOLUTION INTRAMUSCULAR; INTRAVENOUS at 12:09

## 2020-09-24 RX ADMIN — Medication 5 MG: at 12:48

## 2020-09-24 NOTE — ANESTHESIA POSTPROCEDURE EVALUATION
Patient: James Cleveland    Procedure Summary     Date: 09/24/20 Room / Location: [facility] OR 08 / [facility] MAIN OR    Anesthesia Start: 1204 Anesthesia Stop: 1302    Procedure: VENTRAL/INCISIONAL HERNIA REPAIR WITH MESH (N/A Abdomen) Diagnosis:       Ventral hernia without obstruction or gangrene      (Ventral hernia without obstruction or gangrene [K43.9])    Surgeon: Echo Mendoza MD Provider: Odin Benson MD    Anesthesia Type: general ASA Status: 3          Anesthesia Type: general    Vitals  Vitals Value Taken Time   /86 09/24/20 1347   Temp 98 °F (36.7 °C) 09/24/20 1347   Pulse 60 09/24/20 1347   Resp 13 09/24/20 1347   SpO2 94 % 09/24/20 1347           Post Anesthesia Care and Evaluation    Patient location during evaluation: PACU  Patient participation: complete - patient participated  Level of consciousness: awake  Pain scale: See nurse's notes for pain score.  Pain management: adequate  Airway patency: patent  Anesthetic complications: No anesthetic complications  PONV Status: none  Cardiovascular status: acceptable  Respiratory status: acceptable  Hydration status: acceptable    Comments: Patient seen and examined postoperatively; vital signs stable; SpO2 greater than or equal to 90%; cardiopulmonary status stable; nausea/vomiting adequately controlled; pain adequately controlled; no apparent anesthesia complications; patient discharged from anesthesia care when discharge criteria were met

## 2020-09-24 NOTE — ANESTHESIA PROCEDURE NOTES
Airway  Urgency: elective    Date/Time: 9/24/2020 12:12 PM  Airway not difficult    General Information and Staff    Patient location during procedure: OR    Indications and Patient Condition  Indications for airway management: airway protection    Preoxygenated: yes  Mask difficulty assessment: 2 - vent by mask + OA or adjuvant +/- NMBA    Final Airway Details  Final airway type: endotracheal airway      Successful airway: ETT  Cuffed: yes   Successful intubation technique: direct laryngoscopy  Facilitating devices/methods: intubating stylet  Endotracheal tube insertion site: oral  Blade: Farhad  Blade size: 3  ETT size (mm): 7.0  Cormack-Lehane Classification: grade IIa - partial view of glottis  Placement verified by: capnometry   Inital cuff pressure (cm H2O): 23  Number of attempts at approach: 1  Assessment: lips, teeth, and gum same as pre-op and atraumatic intubation

## 2020-09-24 NOTE — ANESTHESIA POSTPROCEDURE EVALUATION
Patient: James Cleveland    Procedure Summary     Date: 09/24/20 Room / Location: [facility] OR 08 / [facility] MAIN OR    Anesthesia Start: 1204 Anesthesia Stop: 1302    Procedure: VENTRAL/INCISIONAL HERNIA REPAIR WITH MESH (N/A Abdomen) Diagnosis:       Ventral hernia without obstruction or gangrene      (Ventral hernia without obstruction or gangrene [K43.9])    Surgeon: Echo Mendoza MD Provider: Odin Benson MD    Anesthesia Type: general ASA Status: 3          Anesthesia Type: general    Vitals  Vitals Value Taken Time   BP     Temp     Pulse 79 09/24/20 1304   Resp     SpO2 98 % 09/24/20 1304   Vitals shown include unvalidated device data.        Post Anesthesia Care and Evaluation    Patient location during evaluation: PACU  Patient participation: complete - patient participated  Level of consciousness: awake  Pain scale: See nurse's notes for pain score.  Pain management: adequate  Airway patency: patent  Anesthetic complications: No anesthetic complications  PONV Status: none  Cardiovascular status: acceptable  Respiratory status: acceptable  Hydration status: acceptable    Comments: Patient seen and examined postoperatively; vital signs stable; SpO2 greater than or equal to 90%; cardiopulmonary status stable; nausea/vomiting adequately controlled; pain adequately controlled; no apparent anesthesia complications; patient discharged from anesthesia care when discharge criteria were met

## 2020-09-24 NOTE — ANESTHESIA PREPROCEDURE EVALUATION
Anesthesia Evaluation     Patient summary reviewed and Nursing notes reviewed   no history of anesthetic complications:  NPO Solid Status: > 8 hours  NPO Liquid Status: > 8 hours           Airway   Mallampati: II  TM distance: >3 FB  Neck ROM: full  No difficulty expected  Dental    (+) edentulous    Pulmonary - normal exam   (+) a smoker Former,   Cardiovascular - normal exam    ECG reviewed    (+) hypertension, hyperlipidemia,       Neuro/Psych  (+) seizures, numbness, psychiatric history Depression,     GI/Hepatic/Renal/Endo    (+) obesity,   renal disease ARF,     Musculoskeletal     (+) chronic pain,   Abdominal   (+) obese,    Substance History - negative use     OB/GYN negative ob/gyn ROS         Other   arthritis,                    Anesthesia Plan    ASA 3     general     intravenous induction     Anesthetic plan, all risks, benefits, and alternatives have been provided, discussed and informed consent has been obtained with: patient.    Plan discussed with CRNA and CAA.

## 2020-10-09 ENCOUNTER — OFFICE VISIT (OUTPATIENT)
Dept: SURGERY | Facility: CLINIC | Age: 65
End: 2020-10-09

## 2020-10-09 VITALS — TEMPERATURE: 97.1 F

## 2020-10-09 DIAGNOSIS — Z09 POSTOPERATIVE FOLLOW-UP: Primary | ICD-10-CM

## 2020-10-09 PROCEDURE — 99024 POSTOP FOLLOW-UP VISIT: CPT | Performed by: PHYSICIAN ASSISTANT

## 2020-10-09 NOTE — PROGRESS NOTES
HPI  James Cleveland presents for postoperative follow up s/p ventral/incisional hernia repair with mesh on 9/24/2020. The patient has had a relatively normal postoperative course.  The patient has had no current complaints. The patient has had no issues with the wound.         Physical Exam  General:  alert, appears stated age and cooperative  Incision:   healing well, no drainage, no erythema, incision well approximated     Assessment/Plan   Assessment:    1. Postoperative follow-up        Plan:   No pathology to review    Advised patient no lifting over 25 pounds until 1 month postop.        Call with any questions or concerns.    Return if symptoms worsen or fail to improve.     Olivia Adam PA-C  10/9/2020

## 2020-10-20 ENCOUNTER — CLINICAL SUPPORT (OUTPATIENT)
Dept: FAMILY MEDICINE CLINIC | Facility: CLINIC | Age: 65
End: 2020-10-20

## 2020-10-20 DIAGNOSIS — Z23 NEED FOR INFLUENZA VACCINATION: ICD-10-CM

## 2020-10-20 PROCEDURE — 90686 IIV4 VACC NO PRSV 0.5 ML IM: CPT | Performed by: NURSE PRACTITIONER

## 2020-10-20 PROCEDURE — G0008 ADMIN INFLUENZA VIRUS VAC: HCPCS | Performed by: NURSE PRACTITIONER

## 2020-10-26 ENCOUNTER — TELEPHONE (OUTPATIENT)
Dept: FAMILY MEDICINE CLINIC | Facility: CLINIC | Age: 65
End: 2020-10-26

## 2020-10-26 DIAGNOSIS — F17.210 CIGARETTE SMOKER: ICD-10-CM

## 2020-10-26 DIAGNOSIS — F32.1 CURRENT MODERATE EPISODE OF MAJOR DEPRESSIVE DISORDER, UNSPECIFIED WHETHER RECURRENT (HCC): Primary | ICD-10-CM

## 2020-10-26 RX ORDER — BUPROPION HYDROCHLORIDE 150 MG/1
TABLET, EXTENDED RELEASE ORAL
Qty: 3 TABLET | Refills: 0
Start: 2020-10-26 | End: 2020-11-03

## 2020-10-26 NOTE — TELEPHONE ENCOUNTER
Patient wanted to let  know that the Wellbutrin helped him stop smoking cigarettes but said the part that's supposed to help his moods isn't helping

## 2020-10-26 NOTE — TELEPHONE ENCOUNTER
Spoke with patient, he states to have depression issue's and he is not sleeping, states to go to sleep but is waking several times a night, he stated would double up on the prescription he has now and see how that goes and discuss further with you next week at appt

## 2020-10-26 NOTE — TELEPHONE ENCOUNTER
Please inform patient that Wellbutrin is one of the most activating of the antidepressants and may be contributing to the  Insomnia. Need to reduce Wellbutrin to once daily and add Zoloft at bed time 1/2 of a 50 mg for the next 3 days then stop Wellbutrin and increase Zoloft to whole 50 mg tablet. I am sending to pharmacy

## 2020-10-26 NOTE — TELEPHONE ENCOUNTER
Please ask him if he would like to increase Wellbutrin to 400 mg daily, change medication, or wait until his scheduled appointment on November 3.  Please ask him what is going on with his moods, is he feeling depressed, anxious, tearful, irritable etc. please ask how he is sleeping.

## 2020-10-27 ENCOUNTER — CLINICAL SUPPORT (OUTPATIENT)
Dept: FAMILY MEDICINE CLINIC | Facility: CLINIC | Age: 65
End: 2020-10-27

## 2020-10-27 DIAGNOSIS — Z23 NEED FOR SHINGLES VACCINE: Primary | ICD-10-CM

## 2020-10-27 PROCEDURE — 90750 HZV VACC RECOMBINANT IM: CPT | Performed by: FAMILY MEDICINE

## 2020-10-27 PROCEDURE — 90471 IMMUNIZATION ADMIN: CPT | Performed by: FAMILY MEDICINE

## 2020-11-03 ENCOUNTER — OFFICE VISIT (OUTPATIENT)
Dept: FAMILY MEDICINE CLINIC | Facility: CLINIC | Age: 65
End: 2020-11-03

## 2020-11-03 VITALS
SYSTOLIC BLOOD PRESSURE: 115 MMHG | DIASTOLIC BLOOD PRESSURE: 80 MMHG | OXYGEN SATURATION: 96 % | HEIGHT: 73 IN | BODY MASS INDEX: 35.39 KG/M2 | HEART RATE: 74 BPM | WEIGHT: 267 LBS | TEMPERATURE: 97.1 F

## 2020-11-03 DIAGNOSIS — I89.0 LYMPHEDEMA: ICD-10-CM

## 2020-11-03 DIAGNOSIS — I10 ESSENTIAL HYPERTENSION: Primary | ICD-10-CM

## 2020-11-03 DIAGNOSIS — R73.09 ABNORMAL GLUCOSE: ICD-10-CM

## 2020-11-03 DIAGNOSIS — N18.2 CHRONIC RENAL INSUFFICIENCY, STAGE 2 (MILD): ICD-10-CM

## 2020-11-03 DIAGNOSIS — N40.1 BENIGN PROSTATIC HYPERPLASIA WITH URINARY HESITANCY: ICD-10-CM

## 2020-11-03 DIAGNOSIS — F41.9 ANXIETY: ICD-10-CM

## 2020-11-03 DIAGNOSIS — R39.11 BENIGN PROSTATIC HYPERPLASIA WITH URINARY HESITANCY: ICD-10-CM

## 2020-11-03 DIAGNOSIS — E78.2 HYPERLIPEMIA, MIXED: ICD-10-CM

## 2020-11-03 DIAGNOSIS — F33.41 RECURRENT MAJOR DEPRESSIVE DISORDER, IN PARTIAL REMISSION (HCC): ICD-10-CM

## 2020-11-03 DIAGNOSIS — Z11.59 NEED FOR HEPATITIS C SCREENING TEST: ICD-10-CM

## 2020-11-03 PROBLEM — N28.9 ACUTE RENAL INSUFFICIENCY: Status: RESOLVED | Noted: 2020-08-07 | Resolved: 2020-11-03

## 2020-11-03 PROCEDURE — 99214 OFFICE O/P EST MOD 30 MIN: CPT | Performed by: FAMILY MEDICINE

## 2020-11-03 RX ORDER — LISINOPRIL 10 MG/1
10 TABLET ORAL DAILY
Qty: 90 TABLET | Refills: 3
Start: 2020-11-03 | End: 2021-02-03

## 2020-11-03 RX ORDER — FUROSEMIDE 20 MG/1
10 TABLET ORAL EVERY OTHER DAY
Qty: 90 TABLET | Refills: 3 | Status: SHIPPED | OUTPATIENT
Start: 2020-11-03 | End: 2021-02-03

## 2020-11-03 NOTE — PROGRESS NOTES
Chief Complaint   Patient presents with   • Hypertension       Subjective     James Cleveland is a 65 y.o. male.   Patient here to follow up on HTN, states to check at home and it runs around 120/80 and has gotten as low as 95/80 Do feel light headed at times.   Anxiety - Previously on Prozac then Wellbutrin was causing some sleep issues changed to   Started Zoloft last week.  Sleeping better    Do have an enlarged prostate, having some hesitancy since stopped lasix, when ran out. Had intended to use 1/2 20 mg qod but did not receive from [facility].  Patient is scheduling appointment with his urologist, Dr. Oviedo.  He declines prescription for Flomax or other treatment at this time.    Preventative health care:   had colonoscopy 6 to 7 years ago we are calling for this report.  Shingrix vaccine last week second scheduled on December 22, otherwise up-to-date on immunizations.  The following portions of the patient's history were reviewed and updated as appropriate: allergies, current medications, past family history, past medical history, past social history, past surgical history and problem list.    Current Outpatient Medications on File Prior to Visit   Medication Sig   • Calcium Carb-Cholecalciferol (Os-Agusto Calcium + D3) 500-200 MG-UNIT tablet Take 1 tablet by mouth 2 (Two) Times a Day. (Patient taking differently: Take 1 tablet by mouth 2 (Two) Times a Day. Do not take after 9/17 for surgery)   • cetirizine (ZYRTEC ALLERGY) 10 MG tablet Take 1 tablet by mouth Daily.   • gabapentin (NEURONTIN) 100 MG capsule 1 to 2 tablets twice daily and 3 tablets at at bedtime   • sertraline (Zoloft) 50 MG tablet 1/2 at hs for 3 days then 1 at hs   • [DISCONTINUED] lisinopril (PRINIVIL,ZESTRIL) 10 MG tablet bid (Patient taking differently: Take 20 mg by mouth Daily. Do not take on the day of surgery)   • [DISCONTINUED] buPROPion SR (Wellbutrin SR) 150 MG 12 hr tablet 1 daily in mornings for 3 days then stop   • [DISCONTINUED]  fluconazole (Diflucan) 150 MG tablet 1 now, repeat in 72 hours   • [DISCONTINUED] furosemide (LASIX) 20 MG tablet Take 0.5 tablets by mouth Every Other Day. (Patient taking differently: Take 10 mg by mouth Every Other Day. Do not take day of surgery)   • [DISCONTINUED] oxyCODONE-acetaminophen (PERCOCET) 5-325 MG per tablet Take 1 tablet by mouth Every 6 (Six) Hours As Needed (Pain).     No current facility-administered medications on file prior to visit.      Medications Discontinued During This Encounter   Medication Reason   • buPROPion SR (Wellbutrin SR) 150 MG 12 hr tablet *Therapy completed   • fluconazole (Diflucan) 150 MG tablet *Therapy completed   • oxyCODONE-acetaminophen (PERCOCET) 5-325 MG per tablet *Therapy completed   • lisinopril (PRINIVIL,ZESTRIL) 10 MG tablet    • furosemide (LASIX) 20 MG tablet        Review of Systems   Constitutional: Negative for appetite change, fatigue, fever and unexpected weight loss.   HENT: Negative for congestion.    Eyes: Negative for visual disturbance.   Respiratory: Negative for cough, shortness of breath and wheezing.    Cardiovascular: Negative for chest pain, palpitations and leg swelling.   Gastrointestinal: Negative for abdominal pain, constipation, diarrhea, nausea, vomiting and indigestion.   Genitourinary: Positive for nocturia ( @ 1 time a night). Negative for dysuria and urinary incontinence.        Hesitency   Musculoskeletal: Positive for arthralgias (left Shoulder pain and right foot and ankle   ).   Skin: Negative for rash.   Neurological: Negative for numbness and headache.   Psychiatric/Behavioral: Negative for sleep disturbance, suicidal ideas and depressed mood (improved on welbutrin). The patient is not nervous/anxious.         Objective   Vitals:    11/03/20 0829   BP: 115/80   BP Location: Left arm   Patient Position: Sitting   Cuff Size: Adult   Pulse: 74   Temp: 97.1 °F (36.2 °C)   TempSrc: Infrared   SpO2: 96%   Weight: 121 kg (267 lb)  "  Height: 185.4 cm (73\")      Body mass index is 35.23 kg/m².    Physical Exam  Vitals signs and nursing note reviewed.   Constitutional:       General: He is not in acute distress.     Appearance: He is well-developed.   HENT:      Head: Normocephalic and atraumatic.   Eyes:      Pupils: Pupils are equal, round, and reactive to light.   Cardiovascular:      Rate and Rhythm: Regular rhythm.      Heart sounds: No murmur.   Pulmonary:      Breath sounds: Normal breath sounds. No wheezing or rales.   Musculoskeletal:      Right lower leg: Edema present.      Left lower leg: Edema present.   Skin:     General: Skin is warm and dry.      Findings: No rash.   Neurological:      Mental Status: He is alert and oriented to person, place, and time.   Psychiatric:         Mood and Affect: Mood normal.         Behavior: Behavior normal.         Thought Content: Thought content normal.         Lab Results   Component Value Date    GLU 94 08/07/2020    BUN 20 09/17/2020    CREATININE 1.16 09/17/2020    EGFRIFNONA 63 09/17/2020    EGFRIFAFRI 77 08/07/2020     09/17/2020    K 4.7 09/17/2020     09/17/2020    CALCIUM 9.7 09/17/2020    ALBUMIN 4.4 08/07/2020    BILITOT 0.5 08/07/2020    ALKPHOS 84 08/07/2020    AST 15 08/07/2020    ALT 18 08/07/2020    MICROALBUR 4.7 08/07/2020    WBC 6.23 09/17/2020    WBC 10.9 (H) 08/07/2020    RBC 4.93 09/17/2020    RBC 4.86 08/07/2020    HCT 46.0 09/17/2020    MCV 93.3 09/17/2020    MCH 31.2 09/17/2020      Lab Results   Component Value Date    HGBA1C 5.8 (H) 08/03/2020    HGBA1C 5.7 (H) 02/04/2020        Procedures     Assessment/Plan   Diagnoses and all orders for this visit:    1. Essential hypertension (Primary)  -     lisinopril (PRINIVIL,ZESTRIL) 10 MG tablet; Take 1 tablet by mouth Daily.  Dispense: 90 tablet; Refill: 3  -     MicroAlbumin, Urine, Random - Urine, Clean Catch; Future    2. Hyperlipemia, mixed  -     Lipid Panel; Future    3. Lymphedema  -     furosemide (LASIX) " 20 MG tablet; Take 0.5 tablets by mouth Every Other Day. For leg edema  Dispense: 90 tablet; Refill: 3    4. Benign prostatic hyperplasia with urinary hesitancy    5. Chronic renal insufficiency, stage 2 (mild)  -     Comprehensive Metabolic Panel; Future    6. Abnormal glucose  -     Hemoglobin A1c; Future    7. Recurrent major depressive disorder, in partial remission (CMS/HCC)    8. Anxiety    9. Need for hepatitis C screening test  -     Hepatitis C Antibody; Future        Hypertension with occasional symptomatic hypotension reducing lisinopril from 20 to 10 mg daily restarting Lasix 10 mg every other day to keep lower extremity edema under control to help reduce recurrence of cellulitis.  Advised if urologist does start an alpha-blocker for BPH this can affect blood pressure and should watch closely, and if hypotension continues may need to reduce lisinopril further.    History of depression and anxiety, was having side effects of insomnia with Wellbutrin, doing with recent change to Zoloft, continue and contact office for any concern.    We will check labs prior to next visit as ordered above.  Medications Discontinued During This Encounter   Medication Reason   • buPROPion SR (Wellbutrin SR) 150 MG 12 hr tablet *Therapy completed   • fluconazole (Diflucan) 150 MG tablet *Therapy completed   • oxyCODONE-acetaminophen (PERCOCET) 5-325 MG per tablet *Therapy completed   • lisinopril (PRINIVIL,ZESTRIL) 10 MG tablet    • furosemide (LASIX) 20 MG tablet           Return in about 3 months (around 2/3/2021) for Recheck, htn, depression, with Labs.    Education reference material relevant to visit available in AVS through [employer] or printed copy given.

## 2020-11-18 DIAGNOSIS — F32.1 CURRENT MODERATE EPISODE OF MAJOR DEPRESSIVE DISORDER, UNSPECIFIED WHETHER RECURRENT (HCC): ICD-10-CM

## 2020-11-30 ENCOUNTER — TELEPHONE (OUTPATIENT)
Dept: FAMILY MEDICINE CLINIC | Facility: CLINIC | Age: 65
End: 2020-11-30

## 2020-11-30 NOTE — TELEPHONE ENCOUNTER
Patient called to make Dr Maradiaga aware that the Zoloft 50 mg tablet gave him diarrhea for one week. He has since cut down to 1/2 of a tablet and the diarrhea has stopped.

## 2020-11-30 NOTE — TELEPHONE ENCOUNTER
Spoke with patient, he is aware and understands, and states to be doing fine on lower dose at this time

## 2020-12-23 DIAGNOSIS — F32.1 CURRENT MODERATE EPISODE OF MAJOR DEPRESSIVE DISORDER, UNSPECIFIED WHETHER RECURRENT (HCC): ICD-10-CM

## 2020-12-28 DIAGNOSIS — F32.1 CURRENT MODERATE EPISODE OF MAJOR DEPRESSIVE DISORDER, UNSPECIFIED WHETHER RECURRENT (HCC): ICD-10-CM

## 2020-12-28 DIAGNOSIS — S42.022S CLOSED DISPLACED FRACTURE OF SHAFT OF LEFT CLAVICLE, SEQUELA: ICD-10-CM

## 2020-12-28 DIAGNOSIS — G54.0 BRACHIAL PLEXUS NEUROPATHY: ICD-10-CM

## 2020-12-29 RX ORDER — GABAPENTIN 100 MG/1
CAPSULE ORAL
Qty: 630 CAPSULE | Refills: 1 | Status: SHIPPED | OUTPATIENT
Start: 2020-12-29 | End: 2021-02-03

## 2021-02-03 ENCOUNTER — OFFICE VISIT (OUTPATIENT)
Dept: FAMILY MEDICINE CLINIC | Facility: CLINIC | Age: 66
End: 2021-02-03

## 2021-02-03 VITALS
BODY MASS INDEX: 35.78 KG/M2 | HEART RATE: 70 BPM | HEIGHT: 73 IN | TEMPERATURE: 97.1 F | SYSTOLIC BLOOD PRESSURE: 133 MMHG | OXYGEN SATURATION: 97 % | DIASTOLIC BLOOD PRESSURE: 78 MMHG | WEIGHT: 270 LBS

## 2021-02-03 DIAGNOSIS — F17.210 CIGARETTE SMOKER: ICD-10-CM

## 2021-02-03 DIAGNOSIS — N18.2 CHRONIC RENAL INSUFFICIENCY, STAGE 2 (MILD): ICD-10-CM

## 2021-02-03 DIAGNOSIS — G54.0 BRACHIAL PLEXUS NEUROPATHY: ICD-10-CM

## 2021-02-03 DIAGNOSIS — E66.01 CLASS 2 SEVERE OBESITY DUE TO EXCESS CALORIES WITH SERIOUS COMORBIDITY AND BODY MASS INDEX (BMI) OF 36.0 TO 36.9 IN ADULT (HCC): ICD-10-CM

## 2021-02-03 DIAGNOSIS — F41.9 ANXIETY: ICD-10-CM

## 2021-02-03 DIAGNOSIS — I10 ESSENTIAL HYPERTENSION: Primary | ICD-10-CM

## 2021-02-03 DIAGNOSIS — Z23 NEED FOR SHINGLES VACCINE: ICD-10-CM

## 2021-02-03 DIAGNOSIS — I89.0 LYMPHEDEMA: ICD-10-CM

## 2021-02-03 DIAGNOSIS — E78.2 HYPERLIPEMIA, MIXED: ICD-10-CM

## 2021-02-03 DIAGNOSIS — S42.022S CLOSED DISPLACED FRACTURE OF SHAFT OF LEFT CLAVICLE, SEQUELA: ICD-10-CM

## 2021-02-03 DIAGNOSIS — F32.1 CURRENT MODERATE EPISODE OF MAJOR DEPRESSIVE DISORDER, UNSPECIFIED WHETHER RECURRENT (HCC): ICD-10-CM

## 2021-02-03 DIAGNOSIS — R39.11 BENIGN PROSTATIC HYPERPLASIA WITH URINARY HESITANCY: ICD-10-CM

## 2021-02-03 DIAGNOSIS — R73.03 PREDIABETES: ICD-10-CM

## 2021-02-03 DIAGNOSIS — N40.1 BENIGN PROSTATIC HYPERPLASIA WITH URINARY HESITANCY: ICD-10-CM

## 2021-02-03 PROCEDURE — 99215 OFFICE O/P EST HI 40 MIN: CPT | Performed by: FAMILY MEDICINE

## 2021-02-03 PROCEDURE — 90750 HZV VACC RECOMBINANT IM: CPT | Performed by: FAMILY MEDICINE

## 2021-02-03 PROCEDURE — 90471 IMMUNIZATION ADMIN: CPT | Performed by: FAMILY MEDICINE

## 2021-02-03 RX ORDER — FUROSEMIDE 20 MG/1
TABLET ORAL
Qty: 90 TABLET | Refills: 3
Start: 2021-02-03 | End: 2021-04-18 | Stop reason: SDUPTHER

## 2021-02-03 RX ORDER — LISINOPRIL 10 MG/1
15 TABLET ORAL DAILY
Qty: 135 TABLET | Refills: 3
Start: 2021-02-03 | End: 2021-08-24

## 2021-02-03 RX ORDER — GABAPENTIN 400 MG/1
CAPSULE ORAL
Qty: 270 CAPSULE | Refills: 1
Start: 2021-02-03 | End: 2021-03-03 | Stop reason: SDUPTHER

## 2021-02-03 NOTE — PROGRESS NOTES
Chief Complaint   Patient presents with   • Depression   • Hypertension       Subjective     James Cleveland is a 65 y.o. male.  He is scheduled today to follow-up on hypertension last visit lisinopril was reduced from 20 to 10 mg due to occasional hypotension.   due to recurrence of lower extremity edema and history of cellulitis. Lasix was restarted at 10 mg  Every other day , last week did not take at all, did take this am.   Home blood pressure checks currently running between  Patient been checking B/P at home and states it runs around 120/80 -150/90 1st thing in am    Occasional lightheadedness has improved.  Anxiety -Believe Zoloft causing diarrhea already reduced to  25 mg only a little better and would like to stop, anxiety is overall improved and he is sleeping some better overall but some nights wake up and have trouble sleeping.  Previously on Prozac then Wellbutrin was causing some sleep issues.     BPH, have not yet had appointment with Dr. Oviedo, will [facility] to [facility]:  Colonoscopy November 22, 2013, Dr. Isaac internal hemorrhoids, mild diverticulosis of the descending and sigmoid colon, hypertrophied anal papilla in the anus.  Repeat in 10 years.    Shingrix vaccine second given today otherwise up-to-date on immunizations.    Still with left shoulder pain, follow up with otho Dr Smith in august  Taking gabapentin 100 mg capuseules, 300 mg at hs plus 4 in am 2 in afternoon.     Do have appt eye  appt with Demetra Franco in late March.     Will get covid vaccination when elligable.     The following portions of the patient's history were reviewed and updated as appropriate: allergies, current medications, past family history, past medical history, past social history, past surgical history and problem list.    Current Outpatient Medications on File Prior to Visit   Medication Sig   • Calcium Carb-Cholecalciferol (Os-Agusto Calcium + D3) 500-200 MG-UNIT tablet Take 1 tablet by  mouth 2 (Two) Times a Day. (Patient taking differently: Take 1 tablet by mouth 2 (Two) Times a Day. Do not take after 9/17 for surgery)   • cetirizine (ZYRTEC ALLERGY) 10 MG tablet Take 1 tablet by mouth Daily.   • [DISCONTINUED] furosemide (LASIX) 20 MG tablet Take 0.5 tablets by mouth Every Other Day. For leg edema   • [DISCONTINUED] gabapentin (NEURONTIN) 100 MG capsule 1 to 2 tablets twice daily and 3 tablets at at bedtime   • [DISCONTINUED] lisinopril (PRINIVIL,ZESTRIL) 10 MG tablet Take 1 tablet by mouth Daily.   • [DISCONTINUED] sertraline (ZOLOFT) 50 MG tablet TAKE 1 TABLET BY MOUTH EVERYDAY AT BEDTIME (Patient taking differently: 25 mg. TAKE 1 TABLET BY MOUTH EVERYDAY AT BEDTIME)   • [DISCONTINUED] sertraline (Zoloft) 50 MG tablet 1/2 at hs for 3 days then 1 at hs     No current facility-administered medications on file prior to visit.      Medications Discontinued During This Encounter   Medication Reason   • sertraline (ZOLOFT) 50 MG tablet Side effects   • gabapentin (NEURONTIN) 100 MG capsule    • lisinopril (PRINIVIL,ZESTRIL) 10 MG tablet    • furosemide (LASIX) 20 MG tablet        Review of Systems   Constitutional: Negative for appetite change, fatigue, fever and unexpected weight loss.   HENT: Negative for congestion.    Eyes: Negative for visual disturbance.   Respiratory: Negative for cough, shortness of breath and wheezing.    Cardiovascular: Negative for chest pain, palpitations and leg swelling.   Gastrointestinal: Negative for abdominal pain, constipation, diarrhea, nausea, vomiting and indigestion.   Genitourinary: Positive for nocturia. Negative for dysuria and urinary incontinence.        Hesitency   Musculoskeletal: Positive for arthralgias (left Shoulder pain and right foot and ankle   ).   Skin: Negative for rash.   Neurological: Negative for numbness and headache.   Psychiatric/Behavioral: Positive for sleep disturbance ( Occasional). Negative for suicidal ideas and depressed mood. The  "patient is not nervous/anxious.         Objective   Vitals:    02/03/21 0837   BP: 133/78   BP Location: Left arm   Patient Position: Sitting   Cuff Size: Adult   Pulse: 70   Temp: 97.1 °F (36.2 °C)   TempSrc: Infrared   SpO2: 97%   Weight: 122 kg (270 lb)   Height: 185.4 cm (73\")      Body mass index is 35.62 kg/m².    Physical Exam  Vitals signs and nursing note reviewed.   Constitutional:       General: He is not in acute distress.     Appearance: He is well-developed.   HENT:      Head: Normocephalic and atraumatic.   Eyes:      Pupils: Pupils are equal, round, and reactive to light.   Cardiovascular:      Rate and Rhythm: Regular rhythm.      Heart sounds: No murmur.   Pulmonary:      Breath sounds: Normal breath sounds. No wheezing or rales.   Musculoskeletal:      Right lower leg: Edema present.      Left lower leg: Edema present.   Skin:     General: Skin is warm and dry.      Findings: No rash.   Neurological:      Mental Status: He is alert and oriented to person, place, and time.   Psychiatric:         Mood and Affect: Mood normal.         Behavior: Behavior normal.         Thought Content: Thought content normal.         Lab Results   Component Value Date     (H) 02/01/2021    BUN 20 02/01/2021    CREATININE 0.96 02/01/2021    EGFRIFNONA 83 02/01/2021    EGFRIFAFRI 96 02/01/2021     02/01/2021    K 4.6 02/01/2021     02/01/2021    CALCIUM 9.8 02/01/2021    ALBUMIN 4.4 02/01/2021    BILITOT 0.4 02/01/2021    ALKPHOS 67 02/01/2021    AST 18 02/01/2021    ALT 27 02/01/2021    CHLPL 174 02/01/2021    TRIG 118 02/01/2021    HDL 42 02/01/2021    VLDL 21 02/01/2021     (H) 02/01/2021    MICROALBUR 7.6 02/01/2021      Lab Results   Component Value Date    HGBA1C 6.0 (H) 02/01/2021    HGBA1C 5.8 (H) 08/03/2020    HGBA1C 5.7 (H) 02/04/2020        Procedures     Assessment/Plan   Diagnoses and all orders for this visit:    1. Essential hypertension (Primary)  -     lisinopril " (PRINIVIL,ZESTRIL) 10 MG tablet; Take 1.5 tablets by mouth Daily.  Dispense: 135 tablet; Refill: 3    2. Current moderate episode of major depressive disorder, unspecified whether recurrent (CMS/[facility])    3. Anxiety    4. Benign prostatic hyperplasia with urinary hesitancy    5. Chronic renal insufficiency, stage 2 (mild)    6. Hyperlipemia, mixed    7. Cigarette smoker    8. Class 2 severe obesity due to excess calories with serious comorbidity and body mass index (BMI) of 36.0 to 36.9 in adult (CMS/[facility])    9. Prediabetes    10. Brachial plexus neuropathy  -     gabapentin (NEURONTIN) 400 MG capsule; 1 to 2 tablets twice daily and 3 tablets at at bedtime  Dispense: 270 capsule; Refill: 1    11. Closed displaced fracture of shaft of left clavicle, sequela  -     gabapentin (NEURONTIN) 400 MG capsule; 1 to 2 tablets twice daily and 3 tablets at at bedtime  Dispense: 270 capsule; Refill: 1    12. Need for shingles vaccine  -     Shingrix Vaccine    13. Lymphedema  -     furosemide (LASIX) 20 MG tablet; 1/2 to 1 tablet daily if needed for leg edema  Dispense: 90 tablet; Refill: 3        Hypertension at goal today however patient is reporting readings as high as 150, will increase lisinopril to 15 mg daily, previously when took 20 he was having occasional hypotension.  No continued edema and patient is taking Lasix somewhat randomly, will discontinue and change to as needed only.  Anxiety improved, Zoloft causing diarrhea at low-dose, will discontinue.  Chronic pain from brachial neuropathy improved but not optimal control with gabapentin increasing to 400 mg 3 times daily.  Spent over 60 minutes today with precharting, chart review, evaluation and discussion with patient and chart completion.  Medications Discontinued During This Encounter   Medication Reason   • sertraline (ZOLOFT) 50 MG tablet Side effects   • gabapentin (NEURONTIN) 100 MG capsule    • lisinopril (PRINIVIL,ZESTRIL) 10 MG tablet    • furosemide (LASIX)  20 MG tablet           Return for Medicare Wellness (think too late for welcome to medicare - got medicare Feb 2020).    Education reference material relevant to visit available in AVS through [employer] or printed copy given.

## 2021-02-22 ENCOUNTER — OFFICE VISIT (OUTPATIENT)
Dept: FAMILY MEDICINE CLINIC | Facility: CLINIC | Age: 66
End: 2021-02-22

## 2021-02-22 VITALS
HEART RATE: 86 BPM | WEIGHT: 271.2 LBS | HEIGHT: 73 IN | RESPIRATION RATE: 16 BRPM | OXYGEN SATURATION: 95 % | SYSTOLIC BLOOD PRESSURE: 121 MMHG | DIASTOLIC BLOOD PRESSURE: 81 MMHG | BODY MASS INDEX: 35.94 KG/M2 | TEMPERATURE: 97.1 F

## 2021-02-22 DIAGNOSIS — N52.9 ERECTILE DYSFUNCTION, UNSPECIFIED ERECTILE DYSFUNCTION TYPE: ICD-10-CM

## 2021-02-22 DIAGNOSIS — Z82.3 FAMILY HISTORY OF STROKE: ICD-10-CM

## 2021-02-22 DIAGNOSIS — Z00.00 MEDICARE ANNUAL WELLNESS VISIT, INITIAL: Primary | ICD-10-CM

## 2021-02-22 DIAGNOSIS — I10 ESSENTIAL HYPERTENSION: ICD-10-CM

## 2021-02-22 DIAGNOSIS — E66.01 MORBID (SEVERE) OBESITY DUE TO EXCESS CALORIES (HCC): ICD-10-CM

## 2021-02-22 DIAGNOSIS — E78.2 HYPERLIPEMIA, MIXED: ICD-10-CM

## 2021-02-22 PROCEDURE — 1170F FXNL STATUS ASSESSED: CPT | Performed by: FAMILY MEDICINE

## 2021-02-22 PROCEDURE — G0438 PPPS, INITIAL VISIT: HCPCS | Performed by: FAMILY MEDICINE

## 2021-02-22 PROCEDURE — 1160F RVW MEDS BY RX/DR IN RCRD: CPT | Performed by: FAMILY MEDICINE

## 2021-02-22 RX ORDER — SILDENAFIL 100 MG/1
100 TABLET, FILM COATED ORAL DAILY PRN
Start: 2021-02-22 | End: 2021-03-03 | Stop reason: DRUGHIGH

## 2021-02-22 RX ORDER — ASPIRIN 81 MG/1
81 TABLET ORAL DAILY
Qty: 30 TABLET | Refills: 12
Start: 2021-02-22

## 2021-02-22 NOTE — PATIENT INSTRUCTIONS
Obesity, Adult  Obesity is the condition of having too much total body fat. Being overweight or obese means that your weight is greater than what is considered healthy for your body size. Obesity is determined by a measurement called BMI. BMI is an estimate of body fat and is calculated from height and weight. For adults, a BMI of 30 or higher is considered obese.  Obesity can lead to other health concerns and major illnesses, including:  · Stroke.  · Coronary artery disease (CAD).  · Type 2 diabetes.  · Some types of cancer, including cancers of the colon, breast, uterus, and gallbladder.  · Osteoarthritis.  · High blood pressure (hypertension).  · High cholesterol.  · Sleep apnea.  · Gallbladder stones.  · Infertility problems.  What are the causes?  Common causes of this condition include:  · Eating daily meals that are high in calories, sugar, and fat.  · Being born with genes that may make you more likely to become obese.  · Having a medical condition that causes obesity, including:  ? Hypothyroidism.  ? Polycystic ovarian syndrome (PCOS).  ? Binge-eating disorder.  ? Cushing syndrome.  · Taking certain medicines, such as steroids, antidepressants, and seizure medicines.  · Not being physically active (sedentary lifestyle).  · Not getting enough sleep.  · Drinking high amounts of sugar-sweetened beverages, such as soft drinks.  What increases the risk?  The following factors may make you more likely to develop this condition:  · Having a family history of obesity.  · Being a woman of  descent.  · Being a man of  descent.  · Living in an area with limited access to:  ? [place], recreation centers, or sidewalks.  ? Healthy food choices, such as grocery stores and farmers' markets.  What are the signs or symptoms?  The main sign of this condition is having too much body fat.  How is this diagnosed?  This condition is diagnosed based on:  · Your BMI. If you are an adult with a BMI of 30 or  higher, you are considered obese.  · Your waist circumference. This measures the distance around your waistline.  · Your skinfold thickness. Your health care provider may gently pinch a fold of your skin and measure it.  You may have other tests to check for underlying conditions.  How is this treated?  Treatment for this condition often includes changing your lifestyle. Treatment may include some or all of the following:  · Dietary changes. This may include developing a healthy meal plan.  · Regular physical activity. This may include activity that causes your heart to beat faster (aerobic exercise) and strength training. Work with your health care provider to design an exercise program that works for you.  · Medicine to help you lose weight if you are unable to lose 1 pound a week after 6 weeks of healthy eating and more physical activity.  · Treating conditions that cause the obesity (underlying conditions).  · Surgery. Surgical options may include gastric banding and gastric bypass. Surgery may be done if:  ? Other treatments have not helped to improve your condition.  ? You have a BMI of 40 or higher.  ? You have life-threatening health problems related to obesity.  Follow these instructions at home:  Eating and drinking    · Follow recommendations from your health care provider about what you eat and drink. Your health care provider may advise you to:  ? Limit fast food, sweets, and processed snack foods.  ? Choose low-fat options, such as low-fat milk instead of whole milk.  ? Eat 5 or more servings of fruits or vegetables every day.  ? Eat at home more often. This gives you more control over what you eat.  ? Choose healthy foods when you eat out.  ? Learn to read food labels. This will help you understand how much food is considered 1 serving.  ? Learn what a healthy serving size is.  ? Keep low-fat snacks available.  ? Limit sugary drinks, such as soda, fruit juice, sweetened iced tea, and flavored  milk.  · Drink enough water to keep your urine pale yellow.  · Do not follow a fad diet. Fad diets can be unhealthy and even dangerous.  Physical activity  · Exercise regularly, as told by your health care provider.  ? Most adults should get up to 150 minutes of moderate-intensity exercise every week.  ? Ask your health care provider what types of exercise are safe for you and how often you should exercise.  · Warm up and stretch before being active.  · Cool down and stretch after being active.  · Rest between periods of activity.  Lifestyle  · Work with your health care provider and a dietitian to set a weight-loss goal that is healthy and reasonable for you.  · Limit your screen time.  · Find ways to reward yourself that do not involve food.  · Do not drink alcohol if:  ? Your health care provider tells you not to drink.  ? You are pregnant, may be pregnant, or are planning to become pregnant.  · If you drink alcohol:  ? Limit how much you use to:  § 0-1 drink a day for women.  § 0-2 drinks a day for men.  ? Be aware of how much alcohol is in your drink. In the [place] one drink equals one 12 oz bottle of beer (355 mL), one 5 oz glass of wine (148 mL), or one 1½ oz glass of hard liquor (44 mL).  General instructions  · Keep a weight-loss journal to keep track of the food you eat and how much exercise you get.  · Take over-the-counter and prescription medicines only as told by your health care provider.  · Take vitamins and supplements only as told by your health care provider.  · Consider joining a support group. Your health care provider may be able to recommend a support group.  · Keep all follow-up visits as told by your health care provider. This is important.  Contact a health care provider if:  · You are unable to meet your weight loss goal after 6 weeks of dietary and lifestyle changes.  Get help right away if you are having:  · Trouble breathing.  · Suicidal thoughts or behaviors.  Summary  · Obesity is the  condition of having too much total body fat.  · Being overweight or obese means that your weight is greater than what is considered healthy for your body size.  · Work with your health care provider and a dietitian to set a weight-loss goal that is healthy and reasonable for you.  · Exercise regularly, as told by your health care provider. Ask your health care provider what types of exercise are safe for you and how often you should exercise.  This information is not intended to replace advice given to you by your health care provider. Make sure you discuss any questions you have with your health care provider.  Document Revised: 08/22/2019 Document Reviewed: 08/22/2019  [employer] Patient Education © 2020 [employer]    Exercising to Lose Weight  Exercise is structured, repetitive physical activity to improve fitness and health. Getting regular exercise is important for everyone. It is especially important if you are overweight. Being overweight increases your risk of heart disease, stroke, diabetes, high blood pressure, and several types of cancer. Reducing your calorie intake and exercising can help you lose weight.  Exercise is usually categorized as moderate or vigorous intensity. To lose weight, most people need to do a certain amount of moderate-intensity or vigorous-intensity exercise each week.  Moderate-intensity exercise    Moderate-intensity exercise is any activity that gets you moving enough to burn at least three times more energy (calories) than if you were sitting.  Examples of moderate exercise include:  · Walking a mile in 15 minutes.  · Doing light yard work.  · Biking at an easy pace.  Most people should get at least 150 minutes (2 hours and 30 minutes) a week of moderate-intensity exercise to maintain their body weight.  Vigorous-intensity exercise  Vigorous-intensity exercise is any activity that gets you moving enough to burn at least six times more calories than if you were sitting. When you  exercise at this intensity, you should be working hard enough that you are not able to carry on a conversation.  Examples of vigorous exercise include:  · Running.  · Playing a team sport, such as football, basketball, and soccer.  · Jumping rope.  Most people should get at least 75 minutes (1 hour and 15 minutes) a week of vigorous-intensity exercise to maintain their body weight.  How can exercise affect me?  When you exercise enough to burn more calories than you eat, you lose weight. Exercise also reduces body fat and builds muscle. The more muscle you have, the more calories you burn. Exercise also:  · Improves mood.  · Reduces stress and tension.  · Improves your overall fitness, flexibility, and endurance.  · Increases bone strength.  The amount of exercise you need to lose weight depends on:  · Your age.  · The type of exercise.  · Any health conditions you have.  · Your overall physical ability.  Talk to your health care provider about how much exercise you need and what types of activities are safe for you.  What actions can I take to lose weight?  Nutrition    · Make changes to your diet as told by your health care provider or diet and nutrition specialist (dietitian). This may include:  ? Eating fewer calories.  ? Eating more protein.  ? Eating less unhealthy fats.  ? Eating a diet that includes fresh fruits and vegetables, whole grains, low-fat dairy products, and lean protein.  ? Avoiding foods with added fat, salt, and sugar.  · Drink plenty of water while you exercise to prevent dehydration or heat stroke.  Activity  · Choose an activity that you enjoy and set realistic goals. Your health care provider can help you make an exercise plan that works for you.  · Exercise at a moderate or vigorous intensity most days of the week.  ? The intensity of exercise may vary from person to person. You can tell how intense a workout is for you by paying attention to your breathing and heartbeat. Most people will  notice their breathing and heartbeat get faster with more intense exercise.  · Do resistance training twice each week, such as:  ? Push-ups.  ? Sit-ups.  ? Lifting weights.  ? Using resistance bands.  · Getting short amounts of exercise can be just as helpful as long structured periods of exercise. If you have trouble finding time to exercise, try to include exercise in your daily routine.  ? Get up, stretch, and walk around every 30 minutes throughout the day.  ? Go for a walk during your lunch break.  ? Park your car farther away from your destination.  ? If you take public transportation, get off one stop early and walk the rest of the way.  ? Make phone calls while standing up and walking around.  ? Take the stairs instead of elevators or escalators.  · Wear comfortable clothes and shoes with good support.  · Do not exercise so much that you hurt yourself, feel dizzy, or get very short of breath.  Where to find more information  · [place] Department of Health and Human Services: www.hhs.gov  · Centers for Disease Control and Prevention (CDC): www.cdc.gov  Contact a health care provider:  · Before starting a new exercise program.  · If you have questions or concerns about your weight.  · If you have a medical problem that keeps you from exercising.  Get help right away if you have any of the following while exercising:  · Injury.  · Dizziness.  · Difficulty breathing or shortness of breath that does not go away when you stop exercising.  · Chest pain.  · Rapid heartbeat.  Summary  · Being overweight increases your risk of heart disease, stroke, diabetes, high blood pressure, and several types of cancer.  · Losing weight happens when you burn more calories than you eat.  · Reducing the amount of calories you eat in addition to getting regular moderate or vigorous exercise each week helps you lose weight.  This information is not intended to replace advice given to you by your health care provider. Make sure you  discuss any questions you have with your health care provider.  Document Revised: 12/31/2018 Document Reviewed: 12/31/2018  [employer] Patient Education © 2020 [employer]    Calorie Counting for Weight Loss  Calories are units of energy. Your body needs a certain amount of calories from food to keep you going throughout the day. When you eat more calories than your body needs, your body stores the extra calories as fat. When you eat fewer calories than your body needs, your body burns fat to get the energy it needs.  Calorie counting means keeping track of how many calories you eat and drink each day. Calorie counting can be helpful if you need to lose weight. If you make sure to eat fewer calories than your body needs, you should lose weight. Ask your health care provider what a healthy weight is for you.  For calorie counting to work, you will need to eat the right number of calories in a day in order to lose a healthy amount of weight per week. A dietitian can help you determine how many calories you need in a day and will give you suggestions on how to reach your calorie goal.  · A healthy amount of weight to lose per week is usually 1-2 lb (0.5-0.9 kg). This usually means that your daily calorie intake should be reduced by 500-750 calories.  · Eating 1,200 - 1,500 calories per day can help most women lose weight.  · Eating 1,500 - 1,800 calories per day can help most men lose weight.  What is my plan?  My goal is to have __________ calories per day.  If I have this many calories per day, I should lose around __________ pounds per week.  What do I need to know about calorie counting?  In order to meet your daily calorie goal, you will need to:  · Find out how many calories are in each food you would like to eat. Try to do this before you eat.  · Decide how much of the food you plan to eat.  · Write down what you ate and how many calories it had. Doing this is called keeping a food log.  To successfully lose  weight, it is important to balance calorie counting with a healthy lifestyle that includes regular activity. Aim for 150 minutes of moderate exercise (such as walking) or 75 minutes of vigorous exercise (such as running) each week.  Where do I find calorie information?    The number of calories in a food can be found on a Nutrition Facts label. If a food does not have a Nutrition Facts label, try to look up the calories online or ask your dietitian for help.  Remember that calories are listed per serving. If you choose to have more than one serving of a food, you will have to multiply the calories per serving by the amount of servings you plan to eat. For example, the label on a package of bread might say that a serving size is 1 slice and that there are 90 calories in a serving. If you eat 1 slice, you will have eaten 90 calories. If you eat 2 slices, you will have eaten 180 calories.  How do I keep a food log?  Immediately after each meal, record the following information in your food log:  · What you ate. Don't forget to include toppings, sauces, and other extras on the food.  · How much you ate. This can be measured in cups, ounces, or number of items.  · How many calories each food and drink had.  · The total number of calories in the meal.  Keep your food log near you, such as in a small notebook in your pocket, or use a mobile sepideh or website. Some programs will calculate calories for you and show you how many calories you have left for the day to meet your goal.  What are some calorie counting tips?    · Use your calories on foods and drinks that will fill you up and not leave you hungry:  ? Some examples of foods that fill you up are nuts and nut butters, vegetables, lean proteins, and high-fiber foods like whole grains. High-fiber foods are foods with more than 5 g fiber per serving.  ? Drinks such as sodas, specialty coffee drinks, alcohol, and juices have a lot of calories, yet do not fill you up.  · Eat  "nutritious foods and avoid empty calories. Empty calories are calories you get from foods or beverages that do not have many vitamins or protein, such as candy, sweets, and soda. It is better to have a nutritious high-calorie food (such as an avocado) than a food with few nutrients (such as a bag of chips).  · Know how many calories are in the foods you eat most often. This will help you calculate calorie counts faster.  · Pay attention to calories in drinks. Low-calorie drinks include water and unsweetened drinks.  · Pay attention to nutrition labels for \"low fat\" or \"fat free\" foods. These foods sometimes have the same amount of calories or more calories than the full fat versions. They also often have added sugar, starch, or salt, to make up for flavor that was removed with the fat.  · Find a way of tracking calories that works for you. Get creative. Try different apps or programs if writing down calories does not work for you.  What are some portion control tips?  · Know how many calories are in a serving. This will help you know how many servings of a certain food you can have.  · Use a measuring cup to measure serving sizes. You could also try weighing out portions on a kitchen scale. With time, you will be able to estimate serving sizes for some foods.  · Take some time to put servings of different foods on your favorite plates, bowls, and cups so you know what a serving looks like.  · Try not to eat straight from a bag or box. Doing this can lead to overeating. Put the amount you would like to eat in a cup or on a plate to make sure you are eating the right portion.  · Use smaller plates, glasses, and bowls to prevent overeating.  · Try not to multitask (for example, watch TV or use your computer) while eating. If it is time to eat, sit down at a table and enjoy your food. This will help you to know when you are full. It will also help you to be aware of what you are eating and how much you are eating.  What " "are tips for following this plan?  Reading food labels  · Check the calorie count compared to the serving size. The serving size may be smaller than what you are used to eating.  · Check the source of the calories. Make sure the food you are eating is high in vitamins and protein and low in saturated and trans fats.  Shopping  · Read nutrition labels while you shop. This will help you make healthy decisions before you decide to purchase your food.  · Make a grocery list and stick to it.  Cooking  · Try to cook your favorite foods in a healthier way. For example, try baking instead of frying.  · Use low-fat dairy products.  Meal planning  · Use more fruits and vegetables. Half of your plate should be fruits and vegetables.  · Include lean proteins like poultry and fish.  How do I count calories when eating out?  · Ask for smaller portion sizes.  · Consider sharing an entree and sides instead of getting your own entree.  · If you get your own entree, eat only half. Ask for a box at the beginning of your meal and put the rest of your entree in it so you are not tempted to eat it.  · If calories are listed on the menu, choose the lower calorie options.  · Choose dishes that include vegetables, fruits, whole grains, low-fat dairy products, and lean protein.  · Choose items that are boiled, broiled, grilled, or steamed. Stay away from items that are buttered, battered, fried, or served with cream sauce. Items labeled \"crispy\" are usually fried, unless stated otherwise.  · Choose water, low-fat milk, unsweetened iced tea, or other drinks without added sugar. If you want an alcoholic beverage, choose a lower calorie option such as a glass of wine or light beer.  · Ask for dressings, sauces, and syrups on the side. These are usually high in calories, so you should limit the amount you eat.  · If you want a salad, choose a garden salad and ask for grilled meats. Avoid extra toppings like villalobos, cheese, or fried items. Ask for " the dressing on the side, or ask for olive oil and vinegar or lemon to use as dressing.  · Estimate how many servings of a food you are given. For example, a serving of cooked rice is ½ cup or about the size of half a baseball. Knowing serving sizes will help you be aware of how much food you are eating at restaurants. The list below tells you how big or small some common portion sizes are based on everyday objects:  ? 1 oz--4 stacked dice.  ? 3 oz--1 deck of cards.  ? 1 tsp--1 die.  ? 1 Tbsp--½ a ping-pong ball.  ? 2 Tbsp--1 ping-pong ball.  ? ½ cup--½ baseball.  ? 1 cup--1 baseball.  Summary  · Calorie counting means keeping track of how many calories you eat and drink each day. If you eat fewer calories than your body needs, you should lose weight.  · A healthy amount of weight to lose per week is usually 1-2 lb (0.5-0.9 kg). This usually means reducing your daily calorie intake by 500-750 calories.  · The number of calories in a food can be found on a Nutrition Facts label. If a food does not have a Nutrition Facts label, try to look up the calories online or ask your dietitian for help.  · Use your calories on foods and drinks that will fill you up, and not on foods and drinks that will leave you hungry.  · Use smaller plates, glasses, and bowls to prevent overeating.  This information is not intended to replace advice given to you by your health care provider. Make sure you discuss any questions you have with your health care provider.  Document Revised: 09/06/2019 Document Reviewed: 11/17/2017  [employer]vier Patient Education © 2020 [employer]

## 2021-03-03 ENCOUNTER — TELEPHONE (OUTPATIENT)
Dept: FAMILY MEDICINE CLINIC | Facility: CLINIC | Age: 66
End: 2021-03-03

## 2021-03-03 DIAGNOSIS — N52.9 ERECTILE DYSFUNCTION, UNSPECIFIED ERECTILE DYSFUNCTION TYPE: ICD-10-CM

## 2021-03-03 DIAGNOSIS — S42.022S CLOSED DISPLACED FRACTURE OF SHAFT OF LEFT CLAVICLE, SEQUELA: ICD-10-CM

## 2021-03-03 DIAGNOSIS — G54.0 BRACHIAL PLEXUS NEUROPATHY: ICD-10-CM

## 2021-03-03 RX ORDER — GABAPENTIN 400 MG/1
400 CAPSULE ORAL 3 TIMES DAILY
Qty: 270 CAPSULE | Refills: 1 | Status: SHIPPED | OUTPATIENT
Start: 2021-03-03 | End: 2021-10-29

## 2021-03-03 RX ORDER — SILDENAFIL CITRATE 20 MG/1
20 TABLET ORAL DAILY
COMMUNITY

## 2021-03-03 NOTE — TELEPHONE ENCOUNTER
PATIENT CALLED STATING THAT HIS MEDICATION sildenafil (Viagra) 100 MG tablet IS 20 MG.     PLEASE CALL IF ANY QUESTIONS [phone]

## 2021-03-03 NOTE — TELEPHONE ENCOUNTER
Caller: James Cleveland    Relationship: Self    Best call back number: [phone]    Medication needed:   Requested Prescriptions     Pending Prescriptions Disp Refills   • gabapentin (NEURONTIN) 400 MG capsule 270 capsule 1     Si to 2 tablets twice daily and 3 tablets at at bedtime       When do you need the refill by: ASAP    Does the patient have less than a 3 day supply:  [] Yes  [x] No    What is the patient's preferred pharmacy: [facility] - [address] [phone] [facility] [phone]

## 2021-03-03 NOTE — TELEPHONE ENCOUNTER
HUB TO READ    LEFT MESSAGE    WANT TO VERIFY IT WAS VIAGRA DUE TO IT THAT MEDICATION COMES IN 50  MG ONLY NOT 20 MG

## 2021-03-03 NOTE — TELEPHONE ENCOUNTER
Patient called the hub and transferred him through, patient states he is on the generic Viagra (Sildenafil) and said that previously his urologist prescribed it and he was on 20MG and could take up to 100MG, I searched the generic of Viagra and it looks like that does come in 20mg

## 2021-04-18 DIAGNOSIS — I89.0 LYMPHEDEMA: ICD-10-CM

## 2021-04-19 RX ORDER — FUROSEMIDE 20 MG/1
TABLET ORAL
Qty: 90 TABLET | Refills: 3
Start: 2021-04-19 | End: 2021-04-26 | Stop reason: SDUPTHER

## 2021-04-26 DIAGNOSIS — I89.0 LYMPHEDEMA: ICD-10-CM

## 2021-04-26 RX ORDER — FUROSEMIDE 20 MG/1
TABLET ORAL
Qty: 15 TABLET | Refills: 0 | Status: SHIPPED | OUTPATIENT
Start: 2021-04-26 | End: 2021-06-14 | Stop reason: SDUPTHER

## 2021-04-26 RX ORDER — FUROSEMIDE 20 MG/1
TABLET ORAL
Qty: 90 TABLET | Refills: 3 | Status: SHIPPED | OUTPATIENT
Start: 2021-04-26 | End: 2022-05-18

## 2021-06-14 ENCOUNTER — OFFICE VISIT (OUTPATIENT)
Dept: FAMILY MEDICINE CLINIC | Facility: CLINIC | Age: 66
End: 2021-06-14

## 2021-06-14 VITALS
BODY MASS INDEX: 35.02 KG/M2 | RESPIRATION RATE: 16 BRPM | HEART RATE: 91 BPM | OXYGEN SATURATION: 96 % | DIASTOLIC BLOOD PRESSURE: 94 MMHG | HEIGHT: 73 IN | TEMPERATURE: 97.5 F | SYSTOLIC BLOOD PRESSURE: 158 MMHG | WEIGHT: 264.2 LBS

## 2021-06-14 DIAGNOSIS — R73.03 PREDIABETES: ICD-10-CM

## 2021-06-14 DIAGNOSIS — R73.09 ABNORMAL GLUCOSE: ICD-10-CM

## 2021-06-14 DIAGNOSIS — I10 ESSENTIAL HYPERTENSION: ICD-10-CM

## 2021-06-14 DIAGNOSIS — F41.9 ANXIETY: ICD-10-CM

## 2021-06-14 DIAGNOSIS — E78.2 HYPERLIPEMIA, MIXED: ICD-10-CM

## 2021-06-14 DIAGNOSIS — S42.035K CLOSED NONDISPLACED FRACTURE OF ACROMIAL END OF LEFT CLAVICLE WITH NONUNION, SUBSEQUENT ENCOUNTER: ICD-10-CM

## 2021-06-14 DIAGNOSIS — N18.2 CHRONIC RENAL INSUFFICIENCY, STAGE 2 (MILD): ICD-10-CM

## 2021-06-14 DIAGNOSIS — K21.9 GASTROESOPHAGEAL REFLUX DISEASE, UNSPECIFIED WHETHER ESOPHAGITIS PRESENT: ICD-10-CM

## 2021-06-14 DIAGNOSIS — L50.9 URTICARIA: Primary | ICD-10-CM

## 2021-06-14 PROCEDURE — 99215 OFFICE O/P EST HI 40 MIN: CPT | Performed by: FAMILY MEDICINE

## 2021-06-14 RX ORDER — FLUOXETINE HYDROCHLORIDE 20 MG/1
20 CAPSULE ORAL DAILY
Qty: 30 CAPSULE | Refills: 3 | Status: SHIPPED | OUTPATIENT
Start: 2021-06-14 | End: 2021-08-24 | Stop reason: SDUPTHER

## 2021-06-14 RX ORDER — HYDROXYZINE HYDROCHLORIDE 25 MG/1
25 TABLET, FILM COATED ORAL EVERY 6 HOURS PRN
Qty: 30 TABLET | Refills: 1 | Status: SHIPPED | OUTPATIENT
Start: 2021-06-14 | End: 2023-01-13

## 2021-06-14 RX ORDER — FAMOTIDINE 40 MG/1
40 TABLET, FILM COATED ORAL DAILY
Qty: 30 TABLET | Refills: 5 | Status: SHIPPED | OUTPATIENT
Start: 2021-06-14 | End: 2021-11-03 | Stop reason: SDUPTHER

## 2021-06-14 NOTE — PATIENT INSTRUCTIONS
Hives  Hives (urticaria) are itchy, red, swollen areas on the skin. Hives can appear on any part of the body. Hives often fade within 24 hours (acute hives). Sometimes, new hives appear after old ones fade and the cycle can continue for several days or weeks (chronic hives). Hives do not spread from person to person (are not contagious).  Hives come from the body's reaction to something a person is allergic to (allergen), something that causes irritation, or various other triggers. When a person is exposed to a trigger, his or her body releases a chemical (histamine) that causes redness, itching, and swelling. Hives can appear right after exposure to a trigger or hours later.  What are the causes?  This condition may be caused by:  · Allergies to foods or ingredients.  · Insect bites or stings.  · Exposure to pollen or pets.  · Contact with latex or chemicals.  · Spending time in sunlight, heat, or cold (exposure).  · Exercise.  · Stress.  · Certain medicines.  You can also get hives from other medical conditions and treatments, such as:  · Viruses, including the common cold.  · Bacterial infections, such as urinary tract infections and strep throat.  · Certain medicines.  · Allergy shots.  · Blood transfusions.  Sometimes, the cause of this condition is not known (idiopathic hives).  What increases the risk?  You are more likely to develop this condition if you:  · Are a woman.  · Have food allergies, especially to citrus fruits, milk, eggs, peanuts, tree nuts, or shellfish.  · Are allergic to:  ? Medicines.  ? Latex.  ? Insects.  ? Animals.  ? Pollen.  What are the signs or symptoms?  Common symptoms of this condition include raised, itchy, red or white bumps or patches on your skin. These areas may:  · Become large and swollen (welts).  · Change in shape and location, quickly and repeatedly.  · Be separate hives or connect over a large area of skin.  · Sting or become painful.  · Turn white when pressed in the  [place]  In severe cases, your hands, feet, and face may also become swollen. This may occur if hives develop deeper in your skin.  How is this diagnosed?  This condition may be diagnosed by your symptoms, medical history, and physical exam.  · Your skin, urine, or blood may be tested to find out what is causing your hives and to rule out other health issues.  · Your health care provider may also remove a small sample of skin from the affected area and examine it under a microscope (biopsy).  How is this treated?  Treatment for this condition depends on the cause and severity of your symptoms. Your health care provider may recommend using cool, wet cloths (cool compresses) or taking cool showers to relieve itching. Treatment may include:  · Medicines that help:  ? Relieve itching (antihistamines).  ? Reduce swelling (corticosteroids).  ? Treat infection (antibiotics).  · An injectable medicine (omalizumab). Your health care provider may prescribe this if you have chronic idiopathic hives and you continue to have symptoms even after treatment with antihistamines.  Severe cases may require an emergency injection of adrenaline (epinephrine) to prevent a life-threatening allergic reaction (anaphylaxis).  Follow these instructions at home:  Medicines  · Take and apply over-the-counter and prescription medicines only as told by your health care provider.  · If you were prescribed an antibiotic medicine, take it as told by your health care provider. Do not stop using the antibiotic even if you start to feel better.  Skin care  · Apply cool compresses to the affected areas.  · Do not scratch or rub your skin.  General instructions  · Do not take hot showers or baths. This can make itching worse.  · Do not wear tight-fitting clothing.  · Use sunscreen and wear protective clothing when you are outside.  · Avoid any substances that cause your hives. Keep a journal to help track what causes your hives. Write  down:  ? What medicines you take.  ? What you eat and drink.  ? What products you use on your skin.  · Keep all follow-up visits as told by your health care provider. This is important.  Contact a health care provider if:  · Your symptoms are not controlled with medicine.  · Your joints are painful or swollen.  Get help right away if:  · You have a fever.  · You have pain in your abdomen.  · Your tongue or lips are swollen.  · Your eyelids are swollen.  · Your chest or throat feels tight.  · You have trouble breathing or swallowing.  These symptoms may represent a serious problem that is an emergency. Do not wait to see if the symptoms will go away. Get medical help right away. Call your local emergency services (911 in the [place] Do not drive yourself to the hospital.  Summary  · Hives (urticaria) are itchy, red, swollen areas on your skin. Hives come from the body's reaction to something a person is allergic to (allergen), something that causes irritation, or various other triggers.  · Treatment for this condition depends on the cause and severity of your symptoms.  · Avoid any substances that cause your hives. Keep a journal to help track what causes your hives.  · Take and apply over-the-counter and prescription medicines only as told by your health care provider.  · Keep all follow-up visits as told by your health care provider. This is important.  This information is not intended to replace advice given to you by your health care provider. Make sure you discuss any questions you have with your health care provider.  Document Revised: 07/03/2019 Document Reviewed: 07/03/2019  [employer] Patient Education © 2021 [employer]    Rash, Adult  A rash is a change in the color of your skin. A rash can also change the way your skin feels. There are many different conditions and factors that can cause a rash. Some rashes may disappear after a few days, but some may last for a few weeks. Common causes of rashes  include:  · Viral infections, such as:  ? Colds.  ? Measles.  ? Hand, foot, and mouth disease.  · Bacterial infections, such as:  ? Scarlet fever.  ? Impetigo.  · Fungal infections, such as Candida.  · Allergic reactions to food, medicines, or skin care products.  Follow these instructions at home:  The goal of treatment is to stop the itching and keep the rash from spreading. Pay attention to any changes in your symptoms. Follow these instructions to help with your condition:  Medicine  Take or apply over-the-counter and prescription medicines only as told by your health care provider. These may include:  · Corticosteroid creams to treat red or swollen skin.  · Anti-itch lotions.  · Oral allergy medicines (antihistamines).  · Oral corticosteroids for severe symptoms.    Skin care  · Apply cool compresses to the affected areas.  · Do not scratch or rub your skin.  · Avoid covering the rash. Make sure the rash is exposed to air as much as possible.  Managing itching and discomfort  · Avoid hot showers or baths, which can make itching worse. A cold shower may help.  · Try taking a bath with:  ? Epsom salts. Follow  instructions on the packaging. You can get these at your local pharmacy or grocery store.  ? Baking soda. Pour a small amount into the bath as told by your health care provider.  ? Colloidal oatmeal. Follow  instructions on the packaging. You can get this at your local pharmacy or grocery store.  · Try applying baking soda paste to your skin. Stir water into baking soda until it reaches a paste-like consistency.  · Try applying calamine lotion. This is an over-the-counter lotion that helps to relieve itchiness.  · Keep cool and out of the sun. Sweating and being hot can make itching worse.  General instructions    · Rest as needed.  · Drink enough fluid to keep your urine pale yellow.  · Wear loose-fitting clothing.  · Avoid scented soaps, detergents, and perfumes. Use gentle soaps,  "detergents, perfumes, and other cosmetic products.  · Avoid any substance that causes your rash. Keep a journal to help track what causes your rash. Write down:  ? What you eat.  ? What cosmetic products you use.  ? What you drink.  ? What you wear. This includes jewelry.  · Keep all follow-up visits as told by your health care provider. This is important.  Contact a health care provider if:  · You sweat at night.  · You lose weight.  · You urinate more than normal.  · You urinate less than normal, or you notice that your urine is a darker color than usual.  · You feel weak.  · You vomit.  · Your skin or the whites of your eyes look yellow (jaundice).  · Your skin:  ? Tingles.  ? Is numb.  · Your rash:  ? Does not go away after several days.  ? Gets worse.  · You are:  ? Unusually thirsty.  ? More tired than normal.  · You have:  ? New symptoms.  ? Pain in your abdomen.  ? A fever.  ? Diarrhea.  Get help right away if you:  · Have a fever and your symptoms suddenly get worse.  · Develop confusion.  · Have a severe headache or a stiff neck.  · Have severe joint pains or stiffness.  · Have a seizure.  · Develop a rash that covers all or most of your body. The rash may or may not be painful.  · Develop blisters that:  ? Are on top of the rash.  ? Grow larger or grow together.  ? Are painful.  ? Are inside your nose or mouth.  · Develop a rash that:  ? Looks like purple pinprick-sized spots all over your body.  ? Has a \"bull's eye\" or looks like a target.  ? Is not related to sun exposure, is red and painful, and causes your skin to peel.  Summary  · A rash is a change in the color of your skin. Some rashes disappear after a few days, but some may last for a few weeks.  · The goal of treatment is to stop the itching and keep the rash from spreading.  · Take or apply over-the-counter and prescription medicines only as told by your health care provider.  · Contact a health care provider if you have new or worsening " symptoms.  · Keep all follow-up visits as told by your health care provider. This is important.  This information is not intended to replace advice given to you by your health care provider. Make sure you discuss any questions you have with your health care provider.  Document Revised: 04/10/2020 Document Reviewed: 07/22/2019  [employer] Patient Education © 2021 [employer]

## 2021-06-14 NOTE — PROGRESS NOTES
Chief Complaint  Rash    History of Present Illness  James Cleveland presents today for rash.    Patient is complaining of having a rash. Patient states he will get itchy and than a rash will pop up. The rash appears and disappears out of now where and it shows up in random areas. He has a small rash on his left wrist right now.   Started 3 weeks ago, last 30 minutes to 1 hour.very pruritic, most in lines but some more in a blotchy pattern.   Did have bone marrow harvesting left hip to inject in left clavicle, DrSiligson, after recurrent fracture. Have been using a topical pain relief cream that includes diclofenac, gabapentin, clonidine, lidocaine, and prilocane.  Otherwise he is not on any new prescriptions.  He stopped the Zoloft back in February and is asking if he can start any new nerve medicine he is feeling stressed.  He had taken fluoxetine for approximately 1 month and quickly changed to Wellbutrin to help him quit smoking.  He was changed to Zoloft to help with sleep however caused some persistent diarrhea.   Dad had similar transient rash.     Subjective            Current Outpatient Medications on File Prior to Visit   Medication Sig   • aspirin (aspirin) 81 MG EC tablet Take 1 tablet by mouth Daily.   • Calcium Carb-Cholecalciferol (Os-Agusto Calcium + D3) 500-200 MG-UNIT tablet Take 1 tablet by mouth 2 (Two) Times a Day. (Patient taking differently: Take 1 tablet by mouth 2 (Two) Times a Day. Do not take after 9/17 for surgery)   • cetirizine (ZYRTEC ALLERGY) 10 MG tablet Take 1 tablet by mouth Daily.   • furosemide (LASIX) 20 MG tablet 1/2 to 1 tablet daily if needed for leg edema   • gabapentin (NEURONTIN) 400 MG capsule Take 1 capsule by mouth 3 (Three) Times a Day.   • lisinopril (PRINIVIL,ZESTRIL) 10 MG tablet Take 1.5 tablets by mouth Daily.   • sildenafil (REVATIO) 20 MG tablet Take 20 mg by mouth Daily. CAN TAKE UP  MG IF NEED   • [DISCONTINUED] furosemide (LASIX) 20 MG tablet 1/2 to 1 tablet  "daily if needed for leg edema   • [DISCONTINUED] sertraline (Zoloft) 50 MG tablet 1/2 at hs for 3 days then 1 at hs     No current facility-administered medications on file prior to visit.       Objective   Vital Signs:   /94 (BP Location: Right arm, Patient Position: Sitting, Cuff Size: Adult)   Pulse 91   Temp 97.5 °F (36.4 °C) (Infrared)   Resp 16   Ht 185.4 cm (73\")   Wt 120 kg (264 lb 3.2 oz)   SpO2 96%   BMI 34.86 kg/m²     Physical Exam  Vitals and nursing note reviewed.   Constitutional:       General: He is not in acute distress.     Appearance: He is well-developed.   HENT:      Head: Normocephalic and atraumatic.   Eyes:      Pupils: Pupils are equal, round, and reactive to light.   Cardiovascular:      Rate and Rhythm: Regular rhythm.      Heart sounds: No murmur heard.     Pulmonary:      Breath sounds: Normal breath sounds. No wheezing or rales.   Musculoskeletal:      Right lower leg: Edema present.      Left lower leg: Edema present.      Comments: Trace lower extremity edema bilaterally   Skin:     General: Skin is warm and dry.      Findings: No rash.      Comments: There are a few slightly pink mostly linear approximately 2 cm long raised patches across the abdomen back.  Patient presented pictures through my chart message and on his phone showing the same areas being bright red and very swollen.  Multiple red superficial broken capillaries left anterior shoulder/upper chest   Neurological:      Mental Status: He is alert and oriented to person, place, and time.   Psychiatric:         Mood and Affect: Mood normal.         Behavior: Behavior normal.         Thought Content: Thought content normal.                Lab Results   Component Value Date    HGBA1C 6.0 (H) 02/01/2021    HGBA1C 5.8 (H) 08/03/2020    HGBA1C 5.7 (H) 02/04/2020                  Assessment and Plan    Diagnoses and all orders for this visit:    1. Urticaria (Primary)  -     famotidine (Pepcid) 40 MG tablet; Take 1 " tablet by mouth Daily. For hives and reflux  Dispense: 30 tablet; Refill: 5  -     hydrOXYzine (ATARAX) 25 MG tablet; Take 1 tablet by mouth Every 6 (Six) Hours As Needed for Itching, Allergies or Anxiety.  Dispense: 30 tablet; Refill: 1  -     Comprehensive Metabolic Panel  -     TSH  -     T4, Free  -     Sedimentation Rate    2. Gastroesophageal reflux disease, unspecified whether esophagitis present  -     famotidine (Pepcid) 40 MG tablet; Take 1 tablet by mouth Daily. For hives and reflux  Dispense: 30 tablet; Refill: 5    3. Anxiety  -     hydrOXYzine (ATARAX) 25 MG tablet; Take 1 tablet by mouth Every 6 (Six) Hours As Needed for Itching, Allergies or Anxiety.  Dispense: 30 tablet; Refill: 1  -     FLUoxetine (PROzac) 20 MG capsule; Take 1 capsule by mouth Daily.  Dispense: 30 capsule; Refill: 3    4. Essential hypertension  -     Comprehensive Metabolic Panel  -     TSH  -     T4, Free  -     Sedimentation Rate    5. Abnormal glucose  -     Hemoglobin A1c    6. Chronic renal insufficiency, stage 2 (mild)  -     Comprehensive Metabolic Panel    7. Prediabetes  -     Hemoglobin A1c    8. Hyperlipemia, mixed  -     Lipid Panel    9. 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  In addition to Zyrtec I am going to have him start Pepcid 40 mg daily hydralazine at night and as needed and restarting fluoxetine.  In addition to routine labs I am adding a.m. thyroid testing and a sed rate.  Hypertension above goal advised him to increase lisinopril from 10 to 15 mg daily as previously prescribed.  If blood pressures do not improve below 140/90 we will need to increase to 20 mg.    Medications Discontinued During This Encounter   Medication Reason   • furosemide (LASIX) 20 MG tablet Duplicate order       I spent 65 minutes caring for James on this date of service. This time includes time  spent by me in the following activities:preparing for the visit, reviewing tests, obtaining and/or reviewing a separately obtained history, performing a medically appropriate examination and/or evaluation , counseling and educating the patient/family/caregiver, ordering medications, tests, or procedures and documenting information in the medical record  Follow Up {Instructions Charge Capture  Follow-up Communications :23}    Return in about 2 months (around 8/17/2021) for Recheck, htn hives, labs prior need to change to [place] office.    Patient was given instructions and counseling regarding his condition or for health maintenance advice. Please see specific information pulled into the AVS if appropriate.

## 2021-07-07 ENCOUNTER — OFFICE VISIT (OUTPATIENT)
Dept: FAMILY MEDICINE CLINIC | Facility: CLINIC | Age: 66
End: 2021-07-07

## 2021-07-07 VITALS
HEIGHT: 73 IN | DIASTOLIC BLOOD PRESSURE: 92 MMHG | BODY MASS INDEX: 34.62 KG/M2 | SYSTOLIC BLOOD PRESSURE: 142 MMHG | OXYGEN SATURATION: 96 % | WEIGHT: 261.2 LBS | TEMPERATURE: 98.6 F | HEART RATE: 66 BPM | RESPIRATION RATE: 16 BRPM

## 2021-07-07 DIAGNOSIS — M54.42 MIDLINE LOW BACK PAIN WITH LEFT-SIDED SCIATICA, UNSPECIFIED CHRONICITY: ICD-10-CM

## 2021-07-07 DIAGNOSIS — Z52.3 BONE MARROW DONOR: ICD-10-CM

## 2021-07-07 DIAGNOSIS — M25.552 LEFT HIP PAIN: ICD-10-CM

## 2021-07-07 DIAGNOSIS — M54.42 MIDLINE LOW BACK PAIN WITH LEFT-SIDED SCIATICA, UNSPECIFIED CHRONICITY: Primary | ICD-10-CM

## 2021-07-07 PROCEDURE — 99214 OFFICE O/P EST MOD 30 MIN: CPT | Performed by: FAMILY MEDICINE

## 2021-07-07 RX ORDER — PREDNISONE 20 MG/1
40 TABLET ORAL DAILY
Qty: 10 TABLET | Refills: 0 | Status: SHIPPED | OUTPATIENT
Start: 2021-07-07 | End: 2021-07-07

## 2021-07-07 RX ORDER — CYCLOBENZAPRINE HCL 10 MG
10 TABLET ORAL 3 TIMES DAILY PRN
COMMUNITY
End: 2021-07-07 | Stop reason: SDUPTHER

## 2021-07-07 RX ORDER — PREDNISONE 20 MG/1
TABLET ORAL
Qty: 10 TABLET | Refills: 0 | Status: SHIPPED | OUTPATIENT
Start: 2021-07-07 | End: 2021-08-24

## 2021-07-07 RX ORDER — TRAMADOL HYDROCHLORIDE 50 MG/1
TABLET ORAL
Qty: 60 TABLET | Refills: 1 | Status: SHIPPED | OUTPATIENT
Start: 2021-07-07 | End: 2021-07-23

## 2021-07-07 RX ORDER — CYCLOBENZAPRINE HCL 10 MG
10 TABLET ORAL 3 TIMES DAILY PRN
Qty: 60 TABLET | Refills: 1 | Status: SHIPPED | OUTPATIENT
Start: 2021-07-07 | End: 2021-08-13

## 2021-07-07 NOTE — PROGRESS NOTES
Chief Complaint  Sciatica    History of Present Illness  James Cleveland presents today for sciatic pain.     Patient states he has had intermitant pain in low back for years with sciatica into either leg. The pain started back about 2 weeks ago. Patient states he seen the chiropractor, Dr Sharma in [place].  on Tuesday or Wednesday of last week, typically this will fix the problem and do not need additional treatment. and it has gotten worse since then. Patient states it feels like a hot butter knife running down his left leg. Patient has been experiencing numbness and tingling in that leg. Patient states his pain level is an 8 today.   Did take flexaril and tylenol, did ease pain to make it bearable, requesting a refill of Flexeril.     Have not needed to take prednisone or narcotics for back pain in the past.  Have taken hydrocodone for other pain, can cause some nausea and constipation and prefer not to take narcotics.    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    Patient states he does continue to have hives despite having Pepcid and Atarax at recent visit.    Subjective            Current Outpatient Medications on File Prior to Visit   Medication Sig   • aspirin (aspirin) 81 MG EC tablet Take 1 tablet by mouth Daily.   • Calcium Carb-Cholecalciferol (Os-Agusto Calcium + D3) 500-200 MG-UNIT tablet Take 1 tablet by mouth 2 (Two) Times a Day. (Patient taking differently: Take 1 tablet by mouth 2 (Two) Times a Day. Do not take after 9/17 for surgery)   • cetirizine (ZYRTEC ALLERGY) 10 MG tablet Take 1 tablet by mouth Daily.   • famotidine (Pepcid) 40 MG tablet Take 1 tablet by mouth Daily. For hives and reflux   • FLUoxetine (PROzac) 20 MG capsule Take 1 capsule by mouth Daily.   • furosemide (LASIX) 20 MG tablet 1/2 to 1 tablet daily if needed for leg edema   • gabapentin (NEURONTIN) 400 MG capsule Take 1 capsule by mouth 3 (Three) Times a Day.  "  • hydrOXYzine (ATARAX) 25 MG tablet Take 1 tablet by mouth Every 6 (Six) Hours As Needed for Itching, Allergies or Anxiety.   • lisinopril (PRINIVIL,ZESTRIL) 10 MG tablet Take 1.5 tablets by mouth Daily.   • [DISCONTINUED] cyclobenzaprine (FLEXERIL) 10 MG tablet Take 10 mg by mouth 3 (Three) Times a Day As Needed for Muscle Spasms.   • sildenafil (REVATIO) 20 MG tablet Take 20 mg by mouth Daily. CAN TAKE UP  MG IF NEED   • [DISCONTINUED] sertraline (Zoloft) 50 MG tablet 1/2 at hs for 3 days then 1 at hs     No current facility-administered medications on file prior to visit.       Objective   Vital Signs:   /92 (BP Location: Left arm, Patient Position: Sitting, Cuff Size: Large Adult)   Pulse 66   Temp 98.6 °F (37 °C) (Infrared)   Resp 16   Ht 185.4 cm (73\")   Wt 118 kg (261 lb 3.2 oz)   SpO2 96%   BMI 34.46 kg/m²     Physical Exam  Vitals and nursing note reviewed.   Constitutional:       General: He is not in acute distress.     Appearance: He is well-developed.   HENT:      Head: Normocephalic and atraumatic.   Eyes:      Pupils: Pupils are equal, round, and reactive to light.   Cardiovascular:      Rate and Rhythm: Regular rhythm.      Heart sounds: No murmur heard.     Pulmonary:      Breath sounds: Normal breath sounds. No wheezing or rales.   Musculoskeletal:      Comments: There is mild tenderness palpation in the low lumbar spine, significant tenderness to palpation of left posterior lateral hip.  There is no significant tenderness to palpation at the greater trochanter or trochanteric bursa or iliotibial band.  Minimal tenderness to palpation of left iliac crest   Skin:     General: Skin is warm and dry.      Findings: No rash.   Neurological:      Mental Status: He is alert and oriented to person, place, and time.                Lab Results   Component Value Date    HGBA1C 6.0 (H) 02/01/2021    HGBA1C 5.8 (H) 08/03/2020    HGBA1C 5.7 (H) 02/04/2020                  Assessment and Plan "    Diagnoses and all orders for this visit:    1. Midline low back pain with left-sided sciatica, unspecified chronicity (Primary)  -     XR Spine Lumbar 2 or 3 View  -     cyclobenzaprine (FLEXERIL) 10 MG tablet; Take 1 tablet by mouth 3 (Three) Times a Day As Needed for Muscle Spasms.  Dispense: 60 tablet; Refill: 1  -     traMADol (ULTRAM) 50 MG tablet; 1 to 2 tablets 3 times daily as needed for moderate to severe pain  Dispense: 60 tablet; Refill: 1  -     predniSONE (DELTASONE) 20 MG tablet; Take 2 tablets by mouth Daily.  Dispense: 10 tablet; Refill: 0    2. Left hip pain  -     XR Spine Lumbar 2 or 3 View  -     XR Hip With or Without Pelvis 2 - 3 View Left  -     cyclobenzaprine (FLEXERIL) 10 MG tablet; Take 1 tablet by mouth 3 (Three) Times a Day As Needed for Muscle Spasms.  Dispense: 60 tablet; Refill: 1  -     traMADol (ULTRAM) 50 MG tablet; 1 to 2 tablets 3 times daily as needed for moderate to severe pain  Dispense: 60 tablet; Refill: 1    3. Bone marrow donor  -     XR Hip With or Without Pelvis 2 - 3 View Left      New problem to me.  Acute on chronic low back pain with left-sided sciatica.  Suspect discomfort from bone marrow biopsy altered gait and triggered current sciatic flare.  Discussed stretching and prescription for steroids, muscle relaxers, and tramadol for as needed use.  Getting x-rays to evaluate for degree of lumbar disease and to rule out any pathology at bone marrow biopsy site.    Patient is returning tomorrow morning for labs as previously scheduled.      Medications Discontinued During This Encounter   Medication Reason   • cyclobenzaprine (FLEXERIL) 10 MG tablet Reorder         Follow Up     Return in 7 weeks (on 8/24/2021) for as previously scheduled or if needed for increased or nonresolution of pain.    Patient was given instructions and counseling regarding his condition or for health maintenance advice. Please see specific information pulled into the AVS if appropriate.

## 2021-07-09 LAB
ALBUMIN SERPL-MCNC: 4.5 G/DL (ref 3.8–4.8)
ALBUMIN/GLOB SERPL: 1.8 {RATIO} (ref 1.2–2.2)
ALP SERPL-CCNC: 66 IU/L (ref 48–121)
ALT SERPL-CCNC: 30 IU/L (ref 0–44)
AST SERPL-CCNC: 24 IU/L (ref 0–40)
BILIRUB SERPL-MCNC: 0.3 MG/DL (ref 0–1.2)
BUN SERPL-MCNC: 22 MG/DL (ref 8–27)
BUN/CREAT SERPL: 17 (ref 10–24)
CALCIUM SERPL-MCNC: 10.1 MG/DL (ref 8.6–10.2)
CHLORIDE SERPL-SCNC: 102 MMOL/L (ref 96–106)
CHOLEST SERPL-MCNC: 224 MG/DL (ref 100–199)
CO2 SERPL-SCNC: 21 MMOL/L (ref 20–29)
CREAT SERPL-MCNC: 1.29 MG/DL (ref 0.76–1.27)
ERYTHROCYTE [SEDIMENTATION RATE] IN BLOOD BY WESTERGREN METHOD: 24 MM/HR (ref 0–30)
GLOBULIN SER CALC-MCNC: 2.5 G/DL (ref 1.5–4.5)
GLUCOSE SERPL-MCNC: 113 MG/DL (ref 65–99)
HBA1C MFR BLD: 6.3 % (ref 4.8–5.6)
HDLC SERPL-MCNC: 47 MG/DL
LDLC SERPL CALC-MCNC: 160 MG/DL (ref 0–99)
POTASSIUM SERPL-SCNC: 4.3 MMOL/L (ref 3.5–5.2)
PROT SERPL-MCNC: 7 G/DL (ref 6–8.5)
SODIUM SERPL-SCNC: 139 MMOL/L (ref 134–144)
T4 FREE SERPL-MCNC: 1.39 NG/DL (ref 0.82–1.77)
TRIGL SERPL-MCNC: 96 MG/DL (ref 0–149)
TSH SERPL DL<=0.005 MIU/L-ACNC: 0.56 UIU/ML (ref 0.45–4.5)
VLDLC SERPL CALC-MCNC: 17 MG/DL (ref 5–40)

## 2021-07-23 DIAGNOSIS — M54.42 MIDLINE LOW BACK PAIN WITH LEFT-SIDED SCIATICA, UNSPECIFIED CHRONICITY: ICD-10-CM

## 2021-07-23 DIAGNOSIS — M25.552 LEFT HIP PAIN: ICD-10-CM

## 2021-07-23 RX ORDER — TRAMADOL HYDROCHLORIDE 50 MG/1
TABLET ORAL
Qty: 60 TABLET | Refills: 1 | Status: SHIPPED | OUTPATIENT
Start: 2021-07-23 | End: 2021-08-24

## 2021-08-13 DIAGNOSIS — M25.552 LEFT HIP PAIN: ICD-10-CM

## 2021-08-13 DIAGNOSIS — M54.42 MIDLINE LOW BACK PAIN WITH LEFT-SIDED SCIATICA, UNSPECIFIED CHRONICITY: ICD-10-CM

## 2021-08-13 RX ORDER — CYCLOBENZAPRINE HCL 10 MG
10 TABLET ORAL 3 TIMES DAILY PRN
Qty: 60 TABLET | Refills: 1 | Status: SHIPPED | OUTPATIENT
Start: 2021-08-13 | End: 2021-10-18 | Stop reason: SDUPTHER

## 2021-08-24 ENCOUNTER — OFFICE VISIT (OUTPATIENT)
Dept: FAMILY MEDICINE CLINIC | Facility: CLINIC | Age: 66
End: 2021-08-24

## 2021-08-24 VITALS
WEIGHT: 263.2 LBS | OXYGEN SATURATION: 97 % | BODY MASS INDEX: 34.88 KG/M2 | HEIGHT: 73 IN | DIASTOLIC BLOOD PRESSURE: 100 MMHG | HEART RATE: 73 BPM | RESPIRATION RATE: 16 BRPM | SYSTOLIC BLOOD PRESSURE: 150 MMHG | TEMPERATURE: 98.7 F

## 2021-08-24 DIAGNOSIS — N18.31 STAGE 3A CHRONIC KIDNEY DISEASE (HCC): ICD-10-CM

## 2021-08-24 DIAGNOSIS — L50.9 HIVES: ICD-10-CM

## 2021-08-24 DIAGNOSIS — R73.09 ELEVATED HEMOGLOBIN A1C: ICD-10-CM

## 2021-08-24 DIAGNOSIS — I10 ESSENTIAL HYPERTENSION: Primary | ICD-10-CM

## 2021-08-24 DIAGNOSIS — F41.9 ANXIETY: ICD-10-CM

## 2021-08-24 DIAGNOSIS — R73.09 ABNORMAL GLUCOSE: ICD-10-CM

## 2021-08-24 DIAGNOSIS — E78.2 HYPERLIPEMIA, MIXED: ICD-10-CM

## 2021-08-24 PROBLEM — F17.210 CIGARETTE SMOKER: Status: RESOLVED | Noted: 2020-02-03 | Resolved: 2021-08-24

## 2021-08-24 PROCEDURE — 99214 OFFICE O/P EST MOD 30 MIN: CPT | Performed by: FAMILY MEDICINE

## 2021-08-24 RX ORDER — LISINOPRIL 20 MG/1
20 TABLET ORAL DAILY
Qty: 90 TABLET | Refills: 0
Start: 2021-08-24 | End: 2021-11-03 | Stop reason: SDUPTHER

## 2021-08-24 RX ORDER — FLUOXETINE HYDROCHLORIDE 20 MG/1
20 CAPSULE ORAL DAILY
Qty: 90 CAPSULE | Refills: 3 | Status: SHIPPED | OUTPATIENT
Start: 2021-08-24 | End: 2022-08-10

## 2021-08-24 RX ORDER — ATORVASTATIN CALCIUM 10 MG/1
10 TABLET, FILM COATED ORAL DAILY
Qty: 90 TABLET | Refills: 3 | Status: SHIPPED | OUTPATIENT
Start: 2021-08-24 | End: 2022-06-20 | Stop reason: SDUPTHER

## 2021-08-24 NOTE — PATIENT INSTRUCTIONS
Hives  Hives (urticaria) are itchy, red, swollen areas on the skin. Hives can appear on any part of the body. Hives often fade within 24 hours (acute hives). Sometimes, new hives appear after old ones fade and the cycle can continue for several days or weeks (chronic hives). Hives do not spread from person to person (are not contagious).  Hives come from the body's reaction to something a person is allergic to (allergen), something that causes irritation, or various other triggers. When a person is exposed to a trigger, his or her body releases a chemical (histamine) that causes redness, itching, and swelling. Hives can appear right after exposure to a trigger or hours later.  What are the causes?  This condition may be caused by:  · Allergies to foods or ingredients.  · Insect bites or stings.  · Exposure to pollen or pets.  · Contact with latex or chemicals.  · Spending time in sunlight, heat, or cold (exposure).  · Exercise.  · Stress.  · Certain medicines.  You can also get hives from other medical conditions and treatments, such as:  · Viruses, including the common cold.  · Bacterial infections, such as urinary tract infections and strep throat.  · Certain medicines.  · Allergy shots.  · Blood transfusions.  Sometimes, the cause of this condition is not known (idiopathic hives).  What increases the risk?  You are more likely to develop this condition if you:  · Are a woman.  · Have food allergies, especially to citrus fruits, milk, eggs, peanuts, tree nuts, or shellfish.  · Are allergic to:  ? Medicines.  ? Latex.  ? Insects.  ? Animals.  ? Pollen.  What are the signs or symptoms?  Common symptoms of this condition include raised, itchy, red or white bumps or patches on your skin. These areas may:  · Become large and swollen (welts).  · Change in shape and location, quickly and repeatedly.  · Be separate hives or connect over a large area of skin.  · Sting or become painful.  · Turn white when pressed in the  [place]  In severe cases, your hands, feet, and face may also become swollen. This may occur if hives develop deeper in your skin.  How is this diagnosed?  This condition may be diagnosed by your symptoms, medical history, and physical exam.  · Your skin, urine, or blood may be tested to find out what is causing your hives and to rule out other health issues.  · Your health care provider may also remove a small sample of skin from the affected area and examine it under a microscope (biopsy).  How is this treated?  Treatment for this condition depends on the cause and severity of your symptoms. Your health care provider may recommend using cool, wet cloths (cool compresses) or taking cool showers to relieve itching. Treatment may include:  · Medicines that help:  ? Relieve itching (antihistamines).  ? Reduce swelling (corticosteroids).  ? Treat infection (antibiotics).  · An injectable medicine (omalizumab). Your health care provider may prescribe this if you have chronic idiopathic hives and you continue to have symptoms even after treatment with antihistamines.  Severe cases may require an emergency injection of adrenaline (epinephrine) to prevent a life-threatening allergic reaction (anaphylaxis).  Follow these instructions at home:  Medicines  · Take and apply over-the-counter and prescription medicines only as told by your health care provider.  · If you were prescribed an antibiotic medicine, take it as told by your health care provider. Do not stop using the antibiotic even if you start to feel better.  Skin care  · Apply cool compresses to the affected areas.  · Do not scratch or rub your skin.  General instructions  · Do not take hot showers or baths. This can make itching worse.  · Do not wear tight-fitting clothing.  · Use sunscreen and wear protective clothing when you are outside.  · Avoid any substances that cause your hives. Keep a journal to help track what causes your hives. Write  down:  ? What medicines you take.  ? What you eat and drink.  ? What products you use on your skin.  · Keep all follow-up visits as told by your health care provider. This is important.  Contact a health care provider if:  · Your symptoms are not controlled with medicine.  · Your joints are painful or swollen.  Get help right away if:  · You have a fever.  · You have pain in your abdomen.  · Your tongue or lips are swollen.  · Your eyelids are swollen.  · Your chest or throat feels tight.  · You have trouble breathing or swallowing.  These symptoms may represent a serious problem that is an emergency. Do not wait to see if the symptoms will go away. Get medical help right away. Call your local emergency services (911 in the [place] Do not drive yourself to the hospital.  Summary  · Hives (urticaria) are itchy, red, swollen areas on your skin. Hives come from the body's reaction to something a person is allergic to (allergen), something that causes irritation, or various other triggers.  · Treatment for this condition depends on the cause and severity of your symptoms.  · Avoid any substances that cause your hives. Keep a journal to help track what causes your hives.  · Take and apply over-the-counter and prescription medicines only as told by your health care provider.  · Keep all follow-up visits as told by your health care provider. This is important.  This information is not intended to replace advice given to you by your health care provider. Make sure you discuss any questions you have with your health care provider.  Document Revised: 07/03/2019 Document Reviewed: 07/03/2019  [employer] Patient Education © 2021 [employer]    Preventing High Cholesterol  Cholesterol is a white, waxy substance similar to fat that the human body needs to help build cells. The liver makes all the cholesterol that a person's body needs. Having high cholesterol (hypercholesterolemia) increases your risk for heart disease and stroke.  Extra or excess cholesterol comes from the food that you eat.  High cholesterol can often be prevented with diet and lifestyle changes. If you already have high cholesterol, you can control it with diet, lifestyle changes, and medicines.  How can high cholesterol affect me?  If you have high cholesterol, fatty deposits (plaques) may build up on the walls of your blood vessels. The blood vessels that carry blood away from your heart are called arteries. Plaques make the arteries narrower and stiffer. This in turn can:  · Restrict or block blood flow and cause blood clots to form.  · Increase your risk for heart attack and stroke.  What can increase my risk for high cholesterol?  This condition is more likely to develop in people who:  · Eat foods that are high in saturated fat or cholesterol. Saturated fat is mostly found in foods that come from animal sources.  · Are overweight.  · Are not getting enough exercise.  · Have a family history of high cholesterol (familial hypercholesterolemia).  What actions can I take to prevent this?  Nutrition    · Eat less saturated fat.  · Avoid trans fats (partially hydrogenated oils). These are often found in margarine and in some baked goods, fried foods, and snacks bought in packages.  · Avoid precooked or cured meat, such as villalobos, sausages, or meat loaves.  · Avoid foods and drinks that have added sugars.  · Eat more fruits, vegetables, and whole grains.  · Choose healthy sources of protein, such as fish, poultry, lean cuts of red meat, beans, peas, lentils, and nuts.  · Choose healthy sources of fat, such as:  ? Nuts.  ? Vegetable oils, especially olive oil.  ? Fish that have healthy fats, such as omega-3 fatty acids. These fish include mackerel or salmon.  Lifestyle  · Lose weight if you are overweight. Maintaining a healthy body mass index (BMI) can help prevent or control high cholesterol. It can also lower your risk for diabetes and high blood pressure. Ask your health  care provider to help you with a diet and exercise plan to lose weight safely.  · Do not use any products that contain nicotine or tobacco, such as cigarettes, e-cigarettes, and chewing tobacco. If you need help quitting, ask your health care provider.  Alcohol use  · Do not drink alcohol if:  ? Your health care provider tells you not to drink.  ? You are pregnant, may be pregnant, or are planning to become pregnant.  · If you drink alcohol:  ? Limit how much you use to:  § 0-1 drink a day for women.  § 0-2 drinks a day for men.  ? Be aware of how much alcohol is in your drink. In the [place] one drink equals one 12 oz bottle of beer (355 mL), one 5 oz glass of wine (148 mL), or one 1½ oz glass of hard liquor (44 mL).  Activity    · Get enough exercise. Do exercises as told by your health care provider.  · Each week, do at least 150 minutes of exercise that takes a medium level of effort (moderate-intensity exercise). This kind of exercise:  ? Makes your heart beat faster while allowing you to still be able to talk.  ? Can be done in short sessions several times a day or longer sessions a few times a week. For example, on 5 days each week, you could walk fast or ride your bike 3 times a day for 10 minutes each time.  Medicines  · Your health care provider may recommend medicines to help lower cholesterol. This may be a medicine to lower the amount of cholesterol that your liver makes. You may need medicine if:  ? Diet and lifestyle changes have not lowered your cholesterol enough.  ? You have high cholesterol and other risk factors for heart disease or stroke.  · Take over-the-counter and prescription medicines only as told by your health care provider.  General information  · Manage your risk factors for high cholesterol. Talk with your health care provider about all your risk factors and how to lower your risk.  · Manage other conditions that you have, such as diabetes or high blood pressure (hypertension).  · Have  blood tests to check your cholesterol levels at regular points in time as told by your health care provider.  · Keep all follow-up visits as told by your health care provider. This is important.  Where to find more information  · American Heart Association: www.heart.org  · National Heart, Lung, and Blood [place]: www.nhlbi.nih.gov  Summary  · High cholesterol increases your risk for heart disease and stroke. By keeping your cholesterol level low, you can reduce your risk for these conditions.  · High cholesterol can often be prevented with diet and lifestyle changes.  · Work with your health care provider to manage your risk factors, and have your blood tested regularly.  This information is not intended to replace advice given to you by your health care provider. Make sure you discuss any questions you have with your health care provider.  Document Revised: 09/29/2020 Document Reviewed: 09/29/2020  [employer] Patient Education © 2021 [employer]  Niacin extended-release tablets or capsules  What is this medicine?  NIACIN (YENNY a sin) is used in combination with a healthy diet to lower bad cholesterol and increase good cholesterol. This medicine is also used to decrease triglycerides. If triglycerides are too high, you may be at risk of developing pancreatitis. This is a painful condition that causes inflammation of the pancreas and can lead to serious health problems. This medicine can also be helpful in patients who have heart disease or who have had a heart attack.  This medicine may be used for other purposes; ask your health care provider or pharmacist if you have questions.  COMMON BRAND NAME(S): Niaspan, Slo-Niacin  What should I tell my health care provider before I take this medicine?  They need to know if you have any of these conditions:  · bleeding problems  · frequently drink alcoholic beverages  · liver disease  · ulcers of intestine or stomach  · an unusual or allergic reaction to niacin, other  medicines, foods, dyes, or preservatives  · pregnant or trying or get pregnant  · breast-feeding  How should I use this medicine?  Take this medicine by mouth with a glass of water. Follow the directions on the prescription label. Do not crush, cut, or chew. Take with a low-fat meal or snack. Take your doses at regular intervals. Do not take your medicine more often than directed. Do not stop taking except on the advice of your doctor or health care professional.  Talk to your pediatrician regarding the use of this medicine in children. Special care may be needed.  Overdosage: If you think you have taken too much of this medicine contact a poison control center or emergency room at once.  NOTE: This medicine is only for you. Do not share this medicine with others.  What if I miss a dose?  If you miss a dose, take it as soon as you can. If it is almost time for your next dose, take only that dose. Do not take double or extra doses.  What may interact with this medicine?  · aspirin  · medicines for blood pressure, chest pain, or heart disease  · nitroglycerin  · nutritional supplements that contain niacin or nicotinamide  · other medicines to lower cholesterol or triglycerides  This list may not describe all possible interactions. Give your health care provider a list of all the medicines, herbs, non-prescription drugs, or dietary supplements you use. Also tell them if you smoke, drink alcohol, or use illegal drugs. Some items may interact with your medicine.  What should I watch for while using this medicine?  Visit your doctor or health care professional for regular checks on your progress. You may need regular tests to make sure your liver is working properly.  You may get drowsy or dizzy. Do not drive, use machinery, or do anything that needs mental alertness until you know how this drug affects you. Do not stand or sit up quickly, especially if you are an older patient. This reduces the risk of dizzy or fainting  spells.  Do not drink hot drinks or alcohol at the same time you take this medicine. Hot drinks and alcohol can increase the flushing caused by this medicine, which can be uncomfortable. Alcohol also can increase possible dizziness.  This drug is only part of a total heart-health program. Your doctor or a dietician can suggest a low-cholesterol and low-fat diet to help. Avoid alcohol and smoking, and keep a proper exercise schedule.  If you are diabetic, close monitoring of your blood sugars can help your blood fat levels. This medicine may change the way your diabetic medicine works, and sometimes will require that your dosages be adjusted. Check with your doctor or health care professional.  You may notice the empty shell of the tablet in your stool. This is no cause for concern.  What side effects may I notice from receiving this medicine?  Side effects that you should report to your doctor or health care professional as soon as possible:  · dark yellow or brown urine  · fainting spells  · grayish stool color  · nausea, vomiting  · palpitations  · severe stomach pain and loss of appetite  · shortness of breath, wheezing  · skin rash and itching  · weakness or tiredness  · yellowing of skin or eyes  Side effects that usually do not require medical attention (report to your doctor or health care professional if they continue or are bothersome):  · diarrhea  · headache  · stomach discomfort or bloating  This list may not describe all possible side effects. Call your doctor for medical advice about side effects. You may report side effects to FDA at 4-406-FDA-6123.  Where should I keep my medicine?  Keep out of the reach of children.  Store at room temperature between 20 and 25 degrees C (68 and 77 degrees F). Throw away any unused medicine after the expiration date.  NOTE: This sheet is a summary. It may not cover all possible information. If you have questions about this medicine, talk to your doctor, pharmacist,  "or health care provider.  ©  [employer] (2009 16:22:37)  Nicotinic Acid (Niacin)  This medicine is used to lower triglyceride and LDL or \"bad\" cholesterol and raise HDL or \"good\" cholesterol. The video explains general information.  To view the content, go to this web address:  [email or web address]    This video will  on: 2023. If you need access to this video following this date, please reach out to the healthcare provider who assigned it to you.  This information is not intended to replace advice given to you by your health care provider. Make sure you discuss any questions you have with your health care provider.  [employer] Patient Education ©  [employer]    Preventing Type 2 Diabetes Mellitus  Type 2 diabetes, also called type 2 diabetes mellitus, is a long-term (chronic) disease that affects sugar (glucose) levels in your blood. Normally, a hormone called insulin allows glucose to enter cells in your body. The cells use glucose for energy. With type 2 diabetes, you will have one or both of these problems:  · Your pancreas does not make enough insulin.  · Cells in your body do not respond properly to insulin that your body makes (insulin resistance).  Insulin resistance or lack of insulin causes extra glucose to build up in the blood instead of going into cells. As a result, high blood glucose (hyperglycemia) develops. That can cause many complications. Being overweight or obese and having an inactive (sedentary) lifestyle can increase your risk for diabetes. Type 2 diabetes can be delayed or prevented by making certain nutrition and lifestyle changes.  How can this condition affect me?  If you do not take steps to prevent diabetes, your blood glucose levels may keep increasing over time. Too much glucose in your blood for a long time can damage your blood vessels, heart, kidneys, nerves, and eyes.  Type 2 diabetes can lead to chronic health problems and " complications, such as:  · Heart disease.  · Stroke.  · Blindness.  · Kidney disease.  · Depression.  · Poor circulation in your feet and legs. In severe cases, a foot or leg may need to be surgically removed (amputated).  What can increase my risk?  You may be more likely to develop type 2 diabetes if you:  · Have type 2 diabetes in your family.  · Are overweight or obese.  · Have a sedentary lifestyle.  · Have insulin resistance or a history of prediabetes.  · Have a history of pregnancy-related (gestational) diabetes or polycystic ovary syndrome (PCOS).  · Are of , , /, or / descent.  What actions can I take to prevent this?  It can be difficult to recognize signs of type 2 diabetes. Taking action to prevent the disease before you develop symptoms is the best way to avoid possible damage to your body. Making certain nutrition and lifestyle changes may prevent or delay the disease and related health problems.  Nutrition    · Eat healthy meals and snacks regularly. Do not skip meals. Fruit or a handful of nuts is a healthy snack between meals.  · Drink water throughout the day. Avoid drinks that contain added sugar, such as soda or sweetened tea. Drink enough fluid to keep your urine pale yellow.  · Follow instructions from your health care provider about eating or drinking restrictions.  · Limit the amount of food you eat by:  ? Controlling how much you eat at a time (portion size).  ? Checking food labels for the serving sizes of food.  ? Using a kitchen scale to weigh amounts of food.  · Sauté or steam food instead of frying it. Cook with water or broth instead of oils or butter.  · Limit saturated fat and salt (sodium) in your diet. Have no more than 1 tsp (2,400 mg) of sodium a day. If you have heart disease or high blood pressure, use less than ½?¾ tsp (1,500 mg) of sodium a day.  Lifestyle    · Lose weight if needed and as told. Your health  care provider can determine how much weight loss is best for you and can help you lose weight safely.  · If you are overweight or obese, you may be told to lose at least 5?7% of your body weight.  · Manage blood pressure, cholesterol, and stress. Your health care provider will help determine the best treatment for you.  · Do not use any products that contain nicotine or tobacco, such as cigarettes, e-cigarettes, and chewing tobacco. If you need help quitting, ask your health care provider.  Activity    · Do physical activity that makes your heart beat faster and makes you sweat (moderate intensity). Do this for at least 30 minutes on at least 5 days of the week, or as much as told by your health care provider.  · Ask your health care provider what activities are safe for you. A mix of activities may be best, such as walking, swimming, cycling, and strength training.  · Try to add physical activity into your day. For example:  ? Park your car farther away than usual so that you walk more.  ? Take a walk during your lunch break.  ? Use stairs instead of elevators or escalators.  ? Walk or bike to work instead of driving.  Alcohol use  If you drink alcohol:  · Limit how much you use to:  ? 0?1 drink a day for women who are not pregnant.  ? 0?2 drinks a day for men.  · Be aware of how much alcohol is in your drink. In the [place] one drink equals one 12 oz bottle of beer (355 mL), one 5 oz glass of wine (148 mL), or one 1½ oz glass of hard liquor (44 mL).  General information  · Talk with your health care provider about your risk factors and how you can reduce your risk for diabetes.  · Have your blood glucose tested regularly, as told by your health care provider.  · Get screening tests as told by your health care provider. You may have these regularly, especially if you have certain risk factors for type 2 diabetes.  · Make an appointment with a registered dietitian. This diet and nutrition specialist can help you make  a healthy eating plan and help you understand portion sizes and food labels.  Where to find support  · Ask your health care provider to recommend a registered dietitian, a certified diabetes care and , or a weight loss program.  · Look for local or online weight loss groups.  · Join a gym, fitness club, or outdoor activity group, such as a walking club.  Where to find more information  To learn more about diabetes and diabetes prevention, visit:  · American Diabetes Association (ADA): www.diabetes.org  · National [place] of Diabetes and Digestive and Kidney Diseases: www.niddk.nih.gov  To learn more about healthy eating, visit:  · [place] Department of Agriculture ([place]): www.[place].gov  · Office of Disease Prevention and Health Promotion (ODPHP): health.gov  Summary  · You can delay or prevent type 2 diabetes by eating healthy foods, losing weight if needed, and increasing your physical activity.  · Talk with your health care provider about your risk factors for type 2 diabetes and how you can reduce your risk.  · It can be difficult to recognize the signs of type 2 diabetes. The best way to avoid possible damage to your body is to take action to prevent the disease before you develop symptoms.  · Get screening tests as told by your health care provider.  This information is not intended to replace advice given to you by your health care provider. Make sure you discuss any questions you have with your health care provider.  Document Revised: 01/11/2021 Document Reviewed: 01/11/2021  [employer] Patient Education © 2021 [employer]    Preventing Chronic Kidney Disease  Chronic kidney disease (CKD) occurs when the kidneys are damaged for at least 3 months and do not function effectively. The kidneys are two organs that do many important jobs in the body, including:  · Removing waste and extra fluid from the blood to make urine.  · Making hormones that maintain the amount of fluid in tissues  and blood vessels.  · Maintaining the right amount of fluids and electrolytes in the body.  A small amount of kidney damage may not cause problems, but a large amount of damage may make it hard or impossible for the kidneys to work the way they should. CKD gets worse over time (is progressive).  You can take steps to prevent CKD or to keep it from getting worse. The best way to prevent kidney damage is to know your risk factors and make changes before you develop symptoms of CKD.  How can this condition affect me?  At first, you may not notice any signs or symptoms of CKD. Symptoms develop slowly and may not be obvious until the kidney damage becomes severe. If steps are not taken to prevent or slow down the disease, CKD can lead to:  · A low red blood cell count (anemia).  · Heart disease.  · Weak bones.  · Nerve damage (neuropathy).  · Stroke.  · Kidney failure and dialysis.  What can increase my risk?  You are more likely to develop CKD if you:  · Are 60 years of age or older.  · Are female.  · Are of , , , , or  descent.  · Are obese.  · Have taken certain medicines for a long time.  · Use tobacco or have used it in the past.  · Have any of the following conditions:  ? Diabetes.  ? High blood pressure.  ? Heart disease.  ? Multiple myeloma.  ? An autoimmune disease.  ? Frequent urinary tract infections.  ? Polycystic kidney disease.  · Have a family history of kidney disease, heart disease, diabetes, or high blood pressure.  · Have problems with urine flow that may be caused by:  ? Cancer.  ? Having kidney stones more than once.  ? An enlarged prostate in males.  What actions can I take to prevent CKD?  Managing conditions that put you at risk  · Talk to your health care provider about your kidney health and your risk factors for CKD.  · Work with your health care provider to manage conditions such as high blood pressure or diabetes. This may involve  taking medicines, eating healthy, or making lifestyle changes to:  ? Get high blood pressure down to the target that your health care provider recommends.  ? Get blood sugar (glucose) levels down to the target that your health care provider recommends.  Eating and drinking    · Follow instructions from your health care provider about diet. This may include:  ? Limiting salt (sodium) intake. You should have less than 1 tsp (2,300 mg) of sodium a day. If you have heart disease or high blood pressure, you should have less than ¾ tsp (1,500 mg) of sodium a day.  ? Limiting protein intake as told by your health care provider. Avoid high-protein foods.  ? Eating a balanced, heart-healthy diet.  ? Avoiding foods that are high in potassium and phosphorous.  · Limit alcohol. If you drink alcohol:  ? Limit how much you use to:  § 0-1 drink a day for nonpregnant women.  § 0-2 drinks a day for men.  ? Be aware of how much alcohol is in your drink. In the [place] one drink equals one 12 oz bottle of beer (355 mL), one 5 oz glass of wine (148 mL), or one 1½ oz glass of hard liquor (44 mL).  · If you have diabetes, work with a nutrition specialist (registered dietitian) or a certified diabetes educator to develop a healthy eating plan.  · Talk with your health care provider about how much fluid you should drink each day.  Lifestyle    · Exercise for at least 30 minutes on 5 or more days of the week, or as much as told by your health care provider.  · Keep your weight at a healthy level. If you are overweight or obese, lose weight as told by your health care provider.  · Do not use any products that contain nicotine or tobacco, such as cigarettes, e-cigarettes, and chewing tobacco. If you need help quitting, ask your health care provider.  General instructions  · Take over-the-counter and prescription medicines only as told by your health care provider. Do not take any new medicines unless approved by your health care  provider.  · Use NSAIDs for pain only when necessary. Ask your health care provider about other pain medicines that do not increase your risk of developing CKD.  · Have a yearly physical exam.  · Learn about your family's medical history. Talk to your relatives and siblings about diabetes, heart disease, and high blood pressure.  Where to find more information  Learn more about CKD and how to prevent CKD from:  · National Kidney Foundation: www.kidney.org  · American Association of Kidney Patients: www.aakp.org  · American Diabetes Association: www.diabetes.org  Summary  · Symptoms of CKD develop slowly and may not be obvious until the kidney damage becomes severe.  · The best way to prevent kidney damage is to know your risk factors and make nutrition and lifestyle changes before you develop symptoms of CKD.  · Follow instructions from your health care provider about diet, which may include limiting how much salt, protein, and alcohol you consume.  · Work with your health care provider to keep your blood pressure and blood sugar levels within the recommended range.  This information is not intended to replace advice given to you by your health care provider. Make sure you discuss any questions you have with your health care provider.  Document Revised: 04/10/2020 Document Reviewed: 01/22/2020  Elsevier Patient Education © 2021 [employer]

## 2021-08-24 NOTE — PROGRESS NOTES
Chief Complaint  Hypertension and Hyperlipidemia      Subjective          James Cleveland presents today for    Patient states he just took his lisinoprol about 20 minutes ago.     Hypertension  This is a chronic problem. The current episode started more than 1 year ago. The problem is unchanged. Associated symptoms include malaise/fatigue, neck pain, peripheral edema and shortness of breath. Pertinent negatives include no anxiety, blurred vision, chest pain, headaches or palpitations. Agents associated with hypertension include NSAIDs. Risk factors for coronary artery disease include dyslipidemia, smoking/tobacco exposure and family history. Current antihypertension treatment includes ACE inhibitors. The current treatment provides mild improvement.   Hyperlipidemia  This is a chronic problem. The current episode started more than 1 year ago. The problem is uncontrolled. Recent lipid tests were reviewed and are high. Associated symptoms include shortness of breath. Pertinent negatives include no chest pain. Current antihyperlipidemic treatment includes exercise. The current treatment provides no improvement of lipids. Risk factors for coronary artery disease include dyslipidemia, male sex and family history.     Above information collected by medical assistant has been reviewed, with additional information added below as needed for clarification.    Started cinomon and garlic tablets to help BP he admits his home blood pressure readings are typically around 128/83.  He has not increase lisinopril above 50 mg.    Asking if Niacin could help Cholesterol. Previously Statin was d/miki due to renal insufficiency after initiated.    Still having issue with intermitant rash, Whelps last about 30 minutes, redness will persist for much longer, still have not been able to identify any triggers.    He is feeling a little overwhelmed at times, his grandmother  last week, other life stresses, he is feeling a little tired but  "not depressed.    Quit smoking Aug 3 2021, continue ot use chew tobacco      Current Outpatient Medications on File Prior to Visit   Medication Sig   • aspirin (aspirin) 81 MG EC tablet Take 1 tablet by mouth Daily.   • Calcium Carb-Cholecalciferol (Os-Agusto Calcium + D3) 500-200 MG-UNIT tablet Take 1 tablet by mouth 2 (Two) Times a Day. (Patient taking differently: Take 1 tablet by mouth 2 (Two) Times a Day. Do not take after 9/17 for surgery)   • cetirizine (ZYRTEC ALLERGY) 10 MG tablet Take 1 tablet by mouth Daily.   • cyclobenzaprine (FLEXERIL) 10 MG tablet Take 1 tablet by mouth 3 (Three) Times a Day As Needed for Muscle Spasms.   • famotidine (Pepcid) 40 MG tablet Take 1 tablet by mouth Daily. For hives and reflux   • furosemide (LASIX) 20 MG tablet 1/2 to 1 tablet daily if needed for leg edema   • gabapentin (NEURONTIN) 400 MG capsule Take 1 capsule by mouth 3 (Three) Times a Day.   • hydrOXYzine (ATARAX) 25 MG tablet Take 1 tablet by mouth Every 6 (Six) Hours As Needed for Itching, Allergies or Anxiety.   • [DISCONTINUED] FLUoxetine (PROzac) 20 MG capsule Take 1 capsule by mouth Daily.   • [DISCONTINUED] lisinopril (PRINIVIL,ZESTRIL) 10 MG tablet Take 1.5 tablets by mouth Daily.   • sildenafil (REVATIO) 20 MG tablet Take 20 mg by mouth Daily. CAN TAKE UP  MG IF NEED   • [DISCONTINUED] predniSONE (DELTASONE) 20 MG tablet TAKE TWO TABLETS BY MOUTH EVERY DAY   • [DISCONTINUED] sertraline (Zoloft) 50 MG tablet 1/2 at hs for 3 days then 1 at hs   • [DISCONTINUED] traMADol (ULTRAM) 50 MG tablet TAKE 1 TO 2 TABLETS BY MOUTH THREE TIMES DAILY AS NEEDED FOR MODERATE TO SEVERE PAIN     No current facility-administered medications on file prior to visit.   Blood pressures weekly as he    Objective   Vital Signs:   /100 (BP Location: Right arm, Patient Position: Sitting, Cuff Size: Adult)   Pulse 73   Temp 98.7 °F (37.1 °C) (Infrared)   Resp 16   Ht 185.4 cm (73\")   Wt 119 kg (263 lb 3.2 oz)   SpO2 97%  "  BMI 34.73 kg/m²     Physical Exam  Vitals and nursing note reviewed.   Constitutional:       General: He is not in acute distress.     Appearance: He is well-developed.   HENT:      Head: Normocephalic and atraumatic.   Eyes:      Pupils: Pupils are equal, round, and reactive to light.   Cardiovascular:      Rate and Rhythm: Regular rhythm.      Heart sounds: No murmur heard.     Pulmonary:      Breath sounds: Normal breath sounds. No wheezing or rales.   Skin:     General: Skin is warm and dry.      Findings: No rash.   Neurological:      Mental Status: He is alert and oriented to person, place, and time.            No visits with results within 1 Day(s) from this visit.   Latest known visit with results is:   Office Visit on 06/14/2021   Component Date Value Ref Range Status   • Hemoglobin A1C 07/08/2021 6.3* 4.8 - 5.6 % Final    Comment:          Prediabetes: 5.7 - 6.4           Diabetes: >6.4           Glycemic control for adults with diabetes: <7.0     • Glucose 07/08/2021 113* 65 - 99 mg/dL Final   • BUN 07/08/2021 22  8 - 27 mg/dL Final   • Creatinine 07/08/2021 1.29* 0.76 - 1.27 mg/dL Final   • eGFR Non  Am 07/08/2021 58* >59 mL/min/1.73 Final   • eGFR African Am 07/08/2021 67  >59 mL/min/1.73 Final    Comment: **[facility] currently reports eGFR in compliance with the current**    recommendations of the National Kidney Foundation. [facility] will    update reporting as new guidelines are published from the NKF-ASN    Task force.     • BUN/Creatinine Ratio 07/08/2021 17  10 - 24 Final   • Sodium 07/08/2021 139  134 - 144 mmol/L Final   • Potassium 07/08/2021 4.3  3.5 - 5.2 mmol/L Final   • Chloride 07/08/2021 102  96 - 106 mmol/L Final   • Total CO2 07/08/2021 21  20 - 29 mmol/L Final   • Calcium 07/08/2021 10.1  8.6 - 10.2 mg/dL Final   • Total Protein 07/08/2021 7.0  6.0 - 8.5 g/dL Final   • Albumin 07/08/2021 4.5  3.8 - 4.8 g/dL Final   • Globulin 07/08/2021 2.5  1.5 - 4.5 g/dL Final   • A/G Ratio  07/08/2021 1.8  1.2 - 2.2 Final   • Total Bilirubin 07/08/2021 0.3  0.0 - 1.2 mg/dL Final   • Alkaline Phosphatase 07/08/2021 66  48 - 121 IU/L Final   • AST (SGOT) 07/08/2021 24  0 - 40 IU/L Final   • ALT (SGPT) 07/08/2021 30  0 - 44 IU/L Final   • Total Cholesterol 07/08/2021 224* 100 - 199 mg/dL Final   • Triglycerides 07/08/2021 96  0 - 149 mg/dL Final   • HDL Cholesterol 07/08/2021 47  >39 mg/dL Final   • VLDL Cholesterol Agusto 07/08/2021 17  5 - 40 mg/dL Final   • LDL Chol Calc (NIH) 07/08/2021 160* 0 - 99 mg/dL Final   • TSH 07/08/2021 0.556  0.450 - 4.500 uIU/mL Final   • Free T4 07/08/2021 1.39  0.82 - 1.77 ng/dL Final   • Sed Rate 07/08/2021 24  0 - 30 mm/hr Final       Lab Results   Component Value Date     (H) 07/08/2021    BUN 22 07/08/2021    CREATININE 1.29 (H) 07/08/2021    EGFRIFNONA 58 (L) 07/08/2021    EGFRIFAFRI 67 07/08/2021     07/08/2021    K 4.3 07/08/2021     07/08/2021    CALCIUM 10.1 07/08/2021    ALBUMIN 4.5 07/08/2021    BILITOT 0.3 07/08/2021    ALKPHOS 66 07/08/2021    AST 24 07/08/2021    ALT 30 07/08/2021    CHLPL 224 (H) 07/08/2021    TRIG 96 07/08/2021    HDL 47 07/08/2021    VLDL 17 07/08/2021     (H) 07/08/2021    TSH 0.556 07/08/2021    FREET4 1.39 07/08/2021        Lab Results   Component Value Date    HGBA1C 6.3 (H) 07/08/2021    HGBA1C 6.0 (H) 02/01/2021    HGBA1C 5.8 (H) 08/03/2020    HGBA1C 5.7 (H) 02/04/2020     The 10-year ASCVD risk score (Star MAR Jr., et al., 2013) is: 21.3%    Values used to calculate the score:      Age: 66 years      Sex: Male      Is Non- : No      Diabetic: No      Tobacco smoker: No      Systolic Blood Pressure: 150 mmHg      Is BP treated: Yes      HDL Cholesterol: 47 mg/dL      Total Cholesterol: 224 mg/dL             Assessment and Plan    Diagnoses and all orders for this visit:    1. Essential hypertension (Primary)  -     lisinopril (PRINIVIL,ZESTRIL) 20 MG tablet; Take 1 tablet by mouth Daily.   Dispense: 90 tablet; Refill: 0  -     Comprehensive Metabolic Panel; Future    2. Hyperlipemia, mixed  -     atorvastatin (LIPITOR) 10 MG tablet; Take 1 tablet by mouth Daily.  Dispense: 90 tablet; Refill: 3  -     Lipid Panel; Future  -     Comprehensive Metabolic Panel; Future    3. Elevated hemoglobin A1c  -     Hemoglobin A1c; Future  -     Comprehensive Metabolic Panel; Future    4. Abnormal glucose    5. Stage 3a chronic kidney disease (CMS/HCC)    6. Hives    7. Anxiety  -     FLUoxetine (PROzac) 20 MG capsule; Take 1 capsule by mouth Daily.  Dispense: 90 capsule; Refill: 3      Hypertension remaining above goal increasing lisinopril from 15 to 20 mg, advised this is not likely to make a huge difference and if blood pressure remains greater than 140/90 need to increase further.    GFR is down again without taking a statin medication.  Discussed other causes of renal insufficiency including hypertension and stressed need to get to treatment goal.  Additionally A1c was elevated need to work diligently to try to keep from developing diabetes.  Continue to follow a low concentrated sweets diet, increase physical activity.    Depression anxiety stable with fluoxetine, renewing today.    Hyperlipidemia with elevated atherosclerotic cardiovascular disease risk, advised statins are much superior at cardiovascular protection/risk reduction and we will reinitiate atorvastatin 10 mg daily      Medications Discontinued During This Encounter   Medication Reason   • predniSONE (DELTASONE) 20 MG tablet *Therapy completed   • traMADol (ULTRAM) 50 MG tablet *Therapy completed   • lisinopril (PRINIVIL,ZESTRIL) 10 MG tablet    • FLUoxetine (PROzac) 20 MG capsule Reorder         Follow Up     Return in about 3 months (around 11/24/2021) for Recheck, htn, cholesterol, diabetes.    Patient was given instructions and counseling regarding his condition or for health maintenance advice. Please see specific information pulled into  the AVS if appropriate.

## 2021-09-01 ENCOUNTER — TELEPHONE (OUTPATIENT)
Dept: FAMILY MEDICINE CLINIC | Facility: CLINIC | Age: 66
End: 2021-09-01

## 2021-09-01 NOTE — TELEPHONE ENCOUNTER
MICHELLE FROM [facility] FAXED PAPER 8-27 FOR DR. ZAVALETA - RX  FOR WRIST AND ELBOW SUPPORT       THEY ARE HAVING TROUBLES WITH THEIR FAX MACHINE AND WANTED TO KNOW IF YOU HAD RECEIVED THOSE    HE WILL BE REFAXING THIS ALSO       SAE FROM [facility] (Self) [phone] Remove     [phone]

## 2021-09-01 NOTE — TELEPHONE ENCOUNTER
Spoke with patient, he said that he doesn't want the brace that [facility] is trying to give him and to just ignore the fax if it happens to come in.

## 2021-09-08 ENCOUNTER — TELEPHONE (OUTPATIENT)
Dept: FAMILY MEDICINE CLINIC | Facility: CLINIC | Age: 66
End: 2021-09-08

## 2021-09-08 NOTE — TELEPHONE ENCOUNTER
I had called and spoke to this patient before. He said he didn't want to do anything with [facility] and  To disregard this

## 2021-09-08 NOTE — TELEPHONE ENCOUNTER
Caller: MICHELLE    Relationship: Other                Best call back number: [phone]    What is the best time to reach you: ANY TIME     Who are you requesting to speak with (clinical staff, provider,  specific staff member): CLINICAL STAFF     What was the call regarding: MICHELLE, WITH [facility] CALLED REGARDING THE FAX THEY SENT A WEEK AGO FOR THE WRIST AND ELBOW SUPPORT BRACE.    HE HADN'T HEARD A RESPONSE FROM OUR OFFICE AND WAS CURIOUS IF WE WERE GETTING THE FAX.    HE STATES HE SENT ANOTHER FAX ON THIS FOR THE PATIENT AGAIN TODAY.    THERE WAS A NOTE ENTERED ON THIS ENCOUNTER FROM LAST WEEK: STATING OUR OFFICE SPOKE TO THE PATIENT AND THE PATIENT IS NOT INTERESTED IN A BRACE FROM THEIR COMPANY. IS THERE A WAY TO CONTACT MICHELLE AND LET HIM KNOW THE PATIENT IS NO LONGER INTERESTED TO PREVENT FUTURE CALLS ON THE SAME ISSUE.    [phone]    Do you require a callback: YES PLEASE

## 2021-10-18 DIAGNOSIS — M25.552 LEFT HIP PAIN: ICD-10-CM

## 2021-10-18 DIAGNOSIS — M54.42 MIDLINE LOW BACK PAIN WITH LEFT-SIDED SCIATICA, UNSPECIFIED CHRONICITY: ICD-10-CM

## 2021-10-19 RX ORDER — CYCLOBENZAPRINE HCL 10 MG
10 TABLET ORAL 3 TIMES DAILY PRN
Qty: 60 TABLET | Refills: 1 | Status: SHIPPED | OUTPATIENT
Start: 2021-10-19 | End: 2021-12-21

## 2021-10-28 DIAGNOSIS — S42.022S CLOSED DISPLACED FRACTURE OF SHAFT OF LEFT CLAVICLE, SEQUELA: ICD-10-CM

## 2021-10-28 DIAGNOSIS — G54.0 BRACHIAL PLEXUS NEUROPATHY: ICD-10-CM

## 2021-10-29 RX ORDER — GABAPENTIN 400 MG/1
400 CAPSULE ORAL 3 TIMES DAILY
Qty: 270 CAPSULE | Refills: 3 | Status: SHIPPED | OUTPATIENT
Start: 2021-10-29 | End: 2022-08-05

## 2021-11-02 ENCOUNTER — TELEPHONE (OUTPATIENT)
Dept: FAMILY MEDICINE CLINIC | Facility: CLINIC | Age: 66
End: 2021-11-02

## 2021-11-02 NOTE — TELEPHONE ENCOUNTER
----- Message from James Cleveland sent at 11/2/2021 12:39 PM EDT -----  Regarding: Abnormal PAD screen  Abnormal bilateral PAD screen. Normal range 1.4-0.90.  Rt leg 0.90  lt leg 0.76  Complains of numbness, tingling and pain at times. Recommend follow up evaluation and treatment as indicated. Please call member to schedule appointment.   Thank You,  Carrie Mendelsberg APRN  [facility]\""  [phone]

## 2021-11-02 NOTE — TELEPHONE ENCOUNTER
A nurse practitioner with [facility] went to the patients house and performed this test and had the patient send this to you in a [facility] message.

## 2021-11-02 NOTE — TELEPHONE ENCOUNTER
Please have patient schedule appointment for evaluation and please ask if he has had any previous evaluation for peripheral arterial disease, any arterial studies.  Also ask a description including location and aggravating factors of his pain.  Specifically does he have pain gets worse with walking that resolves at rest.

## 2021-11-03 DIAGNOSIS — I10 ESSENTIAL HYPERTENSION: ICD-10-CM

## 2021-11-03 DIAGNOSIS — K21.9 GASTROESOPHAGEAL REFLUX DISEASE, UNSPECIFIED WHETHER ESOPHAGITIS PRESENT: ICD-10-CM

## 2021-11-03 DIAGNOSIS — L50.9 URTICARIA: ICD-10-CM

## 2021-11-05 RX ORDER — FAMOTIDINE 40 MG/1
40 TABLET, FILM COATED ORAL DAILY
Qty: 30 TABLET | Refills: 5 | Status: SHIPPED | OUTPATIENT
Start: 2021-11-05 | End: 2022-02-15 | Stop reason: SDUPTHER

## 2021-11-05 RX ORDER — LISINOPRIL 20 MG/1
20 TABLET ORAL DAILY
Qty: 90 TABLET | Refills: 0
Start: 2021-11-05 | End: 2021-11-10 | Stop reason: SDUPTHER

## 2021-11-07 ENCOUNTER — TELEPHONE (OUTPATIENT)
Dept: FAMILY MEDICINE CLINIC | Facility: CLINIC | Age: 66
End: 2021-11-07

## 2021-11-07 NOTE — TELEPHONE ENCOUNTER
Dagmar,  I have something on this patient in my open encounters that I cannot do anything with.  My best guess as to why it is there is that someone entered some preop orders and somehow attached me to them.  Can you see if there is any way this message can be removed.  Thanks in advance for any assistance you can give.  Ashwini Ndiaye

## 2021-11-10 ENCOUNTER — OFFICE VISIT (OUTPATIENT)
Dept: FAMILY MEDICINE CLINIC | Facility: CLINIC | Age: 66
End: 2021-11-10

## 2021-11-10 VITALS
OXYGEN SATURATION: 95 % | DIASTOLIC BLOOD PRESSURE: 64 MMHG | RESPIRATION RATE: 18 BRPM | TEMPERATURE: 97.8 F | BODY MASS INDEX: 36.5 KG/M2 | HEIGHT: 73 IN | SYSTOLIC BLOOD PRESSURE: 140 MMHG | WEIGHT: 275.4 LBS | HEART RATE: 115 BPM

## 2021-11-10 DIAGNOSIS — R51.9 NONINTRACTABLE HEADACHE, UNSPECIFIED CHRONICITY PATTERN, UNSPECIFIED HEADACHE TYPE: ICD-10-CM

## 2021-11-10 DIAGNOSIS — I73.9 PERIPHERAL ARTERIAL DISEASE (HCC): Primary | ICD-10-CM

## 2021-11-10 DIAGNOSIS — I10 ESSENTIAL HYPERTENSION: ICD-10-CM

## 2021-11-10 DIAGNOSIS — Z87.891 HISTORY OF CIGARETTE SMOKING: ICD-10-CM

## 2021-11-10 PROBLEM — R22.0 HEAD LUMP: Status: ACTIVE | Noted: 2021-11-10

## 2021-11-10 PROCEDURE — 99214 OFFICE O/P EST MOD 30 MIN: CPT | Performed by: FAMILY MEDICINE

## 2021-11-10 RX ORDER — LISINOPRIL 40 MG/1
40 TABLET ORAL DAILY
Qty: 90 TABLET | Refills: 3 | Status: SHIPPED | OUTPATIENT
Start: 2021-11-10 | End: 2022-09-29

## 2021-11-10 NOTE — PROGRESS NOTES
Answers for HPI/ROS submitted by the patient on 11/10/2021  What is the primary reason for your visit?: Other  Please describe your symptoms.: PAD  Have you had these symptoms before?: Yes  How long have you been having these symptoms?: 1-4 days  Please list any medications you are currently taking for this condition.: Not at the moment    Chief Complaint  Hypertension and Mass      Subjective          James Cleveland presents today for an abnormal PAD test and a bump on his head. He had his PAD test done on 11-2-2021, his blood pressure was 132/84 that day. On his circulation test he scored 0.90 on his right leg and 0.76 on his left leg.  Some nocernal leg cramps.   Do stil have intermitant hip pain as well   He is also concerned about a sorenss on the right side of his head, he said it has been there for about a week and he experiences a burning sensation behind his right eye. He says he does not recall hitting his head.    Do have a family history of brain tumour        History of Present Illness      Current Outpatient Medications on File Prior to Visit   Medication Sig   • aspirin (aspirin) 81 MG EC tablet Take 1 tablet by mouth Daily.   • atorvastatin (LIPITOR) 10 MG tablet Take 1 tablet by mouth Daily.   • Calcium Carb-Cholecalciferol (Os-Agusto Calcium + D3) 500-200 MG-UNIT tablet Take 1 tablet by mouth 2 (Two) Times a Day. (Patient taking differently: Take 1 tablet by mouth 2 (Two) Times a Day. Do not take after 9/17 for surgery)   • cetirizine (ZYRTEC ALLERGY) 10 MG tablet Take 1 tablet by mouth Daily.   • cyclobenzaprine (FLEXERIL) 10 MG tablet Take 1 tablet by mouth 3 (Three) Times a Day As Needed for Muscle Spasms.   • famotidine (Pepcid) 40 MG tablet Take 1 tablet by mouth Daily. For hives and reflux   • FLUoxetine (PROzac) 20 MG capsule Take 1 capsule by mouth Daily.   • furosemide (LASIX) 20 MG tablet 1/2 to 1 tablet daily if needed for leg edema   • gabapentin (NEURONTIN) 400 MG capsule Take 1 capsule by  "mouth 3 (Three) Times a Day.   • hydrOXYzine (ATARAX) 25 MG tablet Take 1 tablet by mouth Every 6 (Six) Hours As Needed for Itching, Allergies or Anxiety.   • sildenafil (REVATIO) 20 MG tablet Take 20 mg by mouth Daily. CAN TAKE UP  MG IF NEED   • [DISCONTINUED] lisinopril (PRINIVIL,ZESTRIL) 20 MG tablet Take 1 tablet by mouth Daily.   • [DISCONTINUED] sertraline (Zoloft) 50 MG tablet 1/2 at hs for 3 days then 1 at hs     No current facility-administered medications on file prior to visit.       Objective   Vital Signs:   /64   Pulse 115   Temp 97.8 °F (36.6 °C)   Resp 18   Ht 185.4 cm (73\")   Wt 125 kg (275 lb 6.4 oz)   SpO2 95%   BMI 36.33 kg/m²     Physical Exam  Vitals and nursing note reviewed.   Constitutional:       General: He is not in acute distress.     Appearance: He is well-developed.   HENT:      Head: Normocephalic and atraumatic.      Comments: Orbit is intact, normal sensation, no tenderness to palpation, palpable temporal pulses bilaterally, no rashes, lesions or masses.     Right Ear: Ear canal and external ear normal. No drainage. Tympanic membrane is not retracted.      Left Ear: Ear canal and external ear normal. No drainage. Tympanic membrane is not retracted.      Nose: No congestion.      Right Sinus: No maxillary sinus tenderness or frontal sinus tenderness.      Left Sinus: No maxillary sinus tenderness or frontal sinus tenderness.      Mouth/Throat:      Pharynx: No oropharyngeal exudate or posterior oropharyngeal erythema.      Tonsils: No tonsillar exudate.   Eyes:      Conjunctiva/sclera: Conjunctivae normal.      Pupils: Pupils are equal, round, and reactive to light.   Cardiovascular:      Rate and Rhythm: Normal rate and regular rhythm.      Heart sounds: No murmur heard.      Pulmonary:      Effort: Pulmonary effort is normal.      Breath sounds: Normal breath sounds. No wheezing or rales.   Musculoskeletal:         General: Normal range of motion.      Cervical " back: Normal range of motion.      Comments: Varicosities of lower extremities bilaterally, there is no palpable cord, mass, increased warmth.  There is some dry skin on the heels bilaterally he is neurovascularly intact with 2+ dorsalis pedis and posterior tibial pulses bilaterally and positive sensation to monofilament testing except at the heels position sense   Skin:     General: Skin is warm and dry.      Findings: No erythema or rash.   Neurological:      Mental Status: He is alert and oriented to person, place, and time.            No visits with results within 1 Day(s) from this visit.   Latest known visit with results is:   Office Visit on 06/14/2021   Component Date Value Ref Range Status   • Hemoglobin A1C 07/08/2021 6.3* 4.8 - 5.6 % Final    Comment:          Prediabetes: 5.7 - 6.4           Diabetes: >6.4           Glycemic control for adults with diabetes: <7.0     • Glucose 07/08/2021 113* 65 - 99 mg/dL Final   • BUN 07/08/2021 22  8 - 27 mg/dL Final   • Creatinine 07/08/2021 1.29* 0.76 - 1.27 mg/dL Final   • eGFR Non  Am 07/08/2021 58* >59 mL/min/1.73 Final   • eGFR African Am 07/08/2021 67  >59 mL/min/1.73 Final    Comment: **[facility] currently reports eGFR in compliance with the current**    recommendations of the [employer]. [facility] will    update reporting as new guidelines are published from the NKF-ASN    Task force.     • BUN/Creatinine Ratio 07/08/2021 17  10 - 24 Final   • Sodium 07/08/2021 139  134 - 144 mmol/L Final   • Potassium 07/08/2021 4.3  3.5 - 5.2 mmol/L Final   • Chloride 07/08/2021 102  96 - 106 mmol/L Final   • Total CO2 07/08/2021 21  20 - 29 mmol/L Final   • Calcium 07/08/2021 10.1  8.6 - 10.2 mg/dL Final   • Total Protein 07/08/2021 7.0  6.0 - 8.5 g/dL Final   • Albumin 07/08/2021 4.5  3.8 - 4.8 g/dL Final   • Globulin 07/08/2021 2.5  1.5 - 4.5 g/dL Final   • A/G Ratio 07/08/2021 1.8  1.2 - 2.2 Final   • Total Bilirubin 07/08/2021 0.3  0.0 - 1.2 mg/dL  Final   • Alkaline Phosphatase 07/08/2021 66  48 - 121 IU/L Final   • AST (SGOT) 07/08/2021 24  0 - 40 IU/L Final   • ALT (SGPT) 07/08/2021 30  0 - 44 IU/L Final   • Total Cholesterol 07/08/2021 224* 100 - 199 mg/dL Final   • Triglycerides 07/08/2021 96  0 - 149 mg/dL Final   • HDL Cholesterol 07/08/2021 47  >39 mg/dL Final   • VLDL Cholesterol Agusto 07/08/2021 17  5 - 40 mg/dL Final   • LDL Chol Calc (NIH) 07/08/2021 160* 0 - 99 mg/dL Final   • TSH 07/08/2021 0.556  0.450 - 4.500 uIU/mL Final   • Free T4 07/08/2021 1.39  0.82 - 1.77 ng/dL Final   • Sed Rate 07/08/2021 24  0 - 30 mm/hr Final             Lab Results   Component Value Date    HGBA1C 6.3 (H) 07/08/2021    HGBA1C 6.0 (H) 02/01/2021    HGBA1C 5.8 (H) 08/03/2020    HGBA1C 5.7 (H) 02/04/2020            Abnormal PAD screen  Abnormal bilateral PAD screen. Normal range 1.4-0.90.  Rt leg 0.90  lt leg 0.76  Complains of numbness, tingling and pain      Assessment and Plan    Diagnoses and all orders for this visit:    1. Peripheral arterial disease (HCC) (Primary)  Comments:  11/2/21  [facility] home eval   Abnormal PAD screen  Abnormal bilateral PAD screen. Normal range 1.4-0.90.  Rt leg 0.90  lt leg 0.76    2. Essential hypertension  -     lisinopril (PRINIVIL,ZESTRIL) 40 MG tablet; Take 1 tablet by mouth Daily.  Dispense: 90 tablet; Refill: 3    3. Nonintractable headache, unspecified chronicity pattern, unspecified headache type    4. History of cigarette smoking  Comments:  quit cigarettes August 2020 and cigars 6 mos ago.  He did have a 41-pack-year history smoking all but 5 years between 19 and 65 averaging approximately 1 ppd      Mild PAD of left lower extremity,on home screen with minimal symptoms that may or may not be related, patient does have chronic tingling in his feet but has had multiple injuries.  He does have multiple risk factors been addressed.  Risk factors include history of cigarette use he has quit smoking altogether stopping cigarettes in  August 2020 and cigars 6 months ago.  He did have a 41-pack-year history smoking all but 5 years between 19 and 65 averaging approximately 1 pack/day.  Hypertension is suboptimally treated, increase lisinopril from 30 to 40 mg daily.  Please statin therapy as well as low-dose aspirin.  Patient states his symptoms are not significant and he would not consider having any kind of surgery, he declines more official work-up at this time.  Additionally he declines low-dose CT chest to screen for lung cancer or abdominal aortic ultrasound to screen for abdominal aortic aneurysm.  He does report that had history of peripheral arterial disease requiring surgery.    Advised if headaches worsen or become more concerning to make appointment for reevaluation.    Patient has had J&J Covid vaccine, he is going to schedule for Covid booster recommending booster with Moderna, will also get influenza and Pneumovax vaccinations.  Medications Discontinued During This Encounter   Medication Reason   • lisinopril (PRINIVIL,ZESTRIL) 20 MG tablet Reorder         Follow Up     Return in about 3 months (around 2/10/2022) for Recheck, htn, etc.    Patient was given instructions and counseling regarding his condition or for health maintenance advice. Please see specific information pulled into the AVS if appropriate.

## 2021-11-24 DIAGNOSIS — R73.03 PRE-DIABETES: ICD-10-CM

## 2021-11-24 DIAGNOSIS — I10 ESSENTIAL HYPERTENSION: Primary | ICD-10-CM

## 2021-11-24 DIAGNOSIS — Z13.1 SCREENING FOR DIABETES MELLITUS: ICD-10-CM

## 2021-11-24 DIAGNOSIS — E78.2 HYPERLIPEMIA, MIXED: ICD-10-CM

## 2021-12-07 ENCOUNTER — TELEPHONE (OUTPATIENT)
Dept: FAMILY MEDICINE CLINIC | Facility: CLINIC | Age: 66
End: 2021-12-07

## 2021-12-07 NOTE — TELEPHONE ENCOUNTER
Caller: James Cleveland    Relationship: Self    Best call back number: [phone]    What medication are you requesting: Z PACK    What are your current symptoms: SORE THROAT, COUGH, CONGESTION    How long have you been experiencing symptoms: 3 DAYS    Have you had these symptoms before:    [] Yes  [x] No    Have you been treated for these symptoms before:   [] Yes  [x] No    If a prescription is needed, what is your preferred pharmacy and phone number:  [address] - [phone]  - [phone]   [phone]    Additional notes: PATIENT STATES SHE FEELS HE HAS A COLD, AND IS SCHEDULED TO GET HIS COVID BOOSTER THURSDAY AND WANTS TO GET SOMETHING FOR THE COLD BEFORE HE GETS THE BOOSTER VACCINE.

## 2021-12-07 NOTE — TELEPHONE ENCOUNTER
Please inform patient that antibiotics do not help viral infections he needs to increase fluids, rest, use symptomatic treatment with Tylenol or Advil as needed for sore throat, headache, body aches, or fever.  Mucinex DM or Robitussin-DM for cough and congestion, saline sprays or rinses, nasal steroids such as Flonase, Nasonex, Rhinocort as needed for nasal congestion and cough drops as needed.      Symptom Relief for Viral Illnesses       1. DIAGNOSIS  2. GENERAL INSTRUCTIONS   · Cold or cough  · Middle ear fluid ([place] [employer] with Effusion, OME)  · Flu  · Viral sore throat  · Bronchitis    You have been diagnosed with an illness caused by a virus.  Antibiotics do not work on viruses.  When antibiotics aren't needed, they won't help you, and the side effects could still hurt you.  The treatments prescribed below will help you feel better while your body fights off the virus.   · Drink extra water and fluids  · Use a cool mist vaporizer or saline nasal spray to relieve congestion  · For sore throats in older children and adults, use ice chips, sore throat spray, or lozenges  · Use honey to relieve cough.  Do not give honey to an infant younger than 1 year.      3. SPECIFIC MEDICINES  4. FOLLOW UP   Use over-the-counter medications specific to your symptoms. Use medicines according to the package instructions or as directed by your healthcare professional.  Stop the medication when the symptoms get better.   If not improved in 3-5 days, if new symptoms occur, or if you have other concerns, please call or return to the office for a recheck.         To learn more about antibiotic prescribing and use, visit www.cdc.gov/antibiotic-use

## 2021-12-07 NOTE — TELEPHONE ENCOUNTER
Symptom Relief for Viral Illnesses       1. DIAGNOSIS  2. GENERAL INSTRUCTIONS   · Cold or cough  · Middle ear fluid ([place] [employer] with Effusion, OME)  · Flu  · Viral sore throat  · Bronchitis    You have been diagnosed with an illness caused by a virus.  Antibiotics do not work on viruses.  When antibiotics aren't needed, they won't help you, and the side effects could still hurt you.  The treatments prescribed below will help you feel better while your body fights off the virus.   · Drink extra water and fluids  · Use a cool mist vaporizer or saline nasal spray to relieve congestion  · For sore throats in older children and adults, use ice chips, sore throat spray, or lozenges  · Use honey to relieve cough.  Do not give honey to an infant younger than 1 year.      3. SPECIFIC MEDICINES  4. FOLLOW UP   Use over-the-counter medications specific to your symptoms. Use medicines according to the package instructions or as directed by your healthcare professional.  Stop the medication when the symptoms get better.   If not improved in 3-5 days, if new symptoms occur, or if you have other concerns, please call or return to the office for a recheck.         To learn more about antibiotic prescribing and use, visit www.cdc.gov/antibiotic-use  l

## 2021-12-20 DIAGNOSIS — M54.42 MIDLINE LOW BACK PAIN WITH LEFT-SIDED SCIATICA, UNSPECIFIED CHRONICITY: ICD-10-CM

## 2021-12-20 DIAGNOSIS — M25.552 LEFT HIP PAIN: ICD-10-CM

## 2021-12-21 RX ORDER — CYCLOBENZAPRINE HCL 10 MG
10 TABLET ORAL 3 TIMES DAILY PRN
Qty: 60 TABLET | Refills: 1 | Status: SHIPPED | OUTPATIENT
Start: 2021-12-21 | End: 2022-02-09 | Stop reason: SDUPTHER

## 2022-01-25 ENCOUNTER — TELEPHONE (OUTPATIENT)
Dept: FAMILY MEDICINE CLINIC | Facility: CLINIC | Age: 67
End: 2022-01-25

## 2022-01-25 NOTE — TELEPHONE ENCOUNTER
Provider: DR PORTILLO  Caller: VICENTE WHIPPLE  Relationship to Patient: PATIENT    Phone Number: [phone]  Reason for Call: PATIENT WOULD LIKE TO KNOW IF HE NEEDS TO HAVE BLOOD WORK DRAWN PRIOR TO HIS WELLNESS VISIT ON 2/28/22.  PLEASE CALL AND ADVISE.

## 2022-02-09 DIAGNOSIS — M25.552 LEFT HIP PAIN: ICD-10-CM

## 2022-02-09 DIAGNOSIS — M54.42 MIDLINE LOW BACK PAIN WITH LEFT-SIDED SCIATICA, UNSPECIFIED CHRONICITY: ICD-10-CM

## 2022-02-09 RX ORDER — CYCLOBENZAPRINE HCL 10 MG
10 TABLET ORAL 3 TIMES DAILY PRN
Qty: 180 TABLET | Refills: 0 | Status: SHIPPED | OUTPATIENT
Start: 2022-02-09 | End: 2022-06-20 | Stop reason: SDUPTHER

## 2022-02-15 DIAGNOSIS — L50.9 URTICARIA: ICD-10-CM

## 2022-02-15 DIAGNOSIS — K21.9 GASTROESOPHAGEAL REFLUX DISEASE, UNSPECIFIED WHETHER ESOPHAGITIS PRESENT: ICD-10-CM

## 2022-02-15 RX ORDER — FAMOTIDINE 40 MG/1
40 TABLET, FILM COATED ORAL DAILY
Qty: 90 TABLET | Refills: 3 | Status: SHIPPED | OUTPATIENT
Start: 2022-02-15 | End: 2022-12-01

## 2022-02-22 LAB
ALBUMIN SERPL-MCNC: 4.6 G/DL (ref 3.8–4.8)
ALBUMIN/GLOB SERPL: 2 {RATIO} (ref 1.2–2.2)
ALP SERPL-CCNC: 80 IU/L (ref 44–121)
ALT SERPL-CCNC: 27 IU/L (ref 0–44)
AST SERPL-CCNC: 22 IU/L (ref 0–40)
BILIRUB SERPL-MCNC: 0.3 MG/DL (ref 0–1.2)
BUN SERPL-MCNC: 18 MG/DL (ref 8–27)
BUN/CREAT SERPL: 17 (ref 10–24)
CALCIUM SERPL-MCNC: 9.7 MG/DL (ref 8.6–10.2)
CHLORIDE SERPL-SCNC: 103 MMOL/L (ref 96–106)
CHOLEST SERPL-MCNC: 149 MG/DL (ref 100–199)
CHOLEST/HDLC SERPL: 3.2 RATIO (ref 0–5)
CO2 SERPL-SCNC: 21 MMOL/L (ref 20–29)
CREAT SERPL-MCNC: 1.05 MG/DL (ref 0.76–1.27)
GLOBULIN SER CALC-MCNC: 2.3 G/DL (ref 1.5–4.5)
GLUCOSE SERPL-MCNC: 113 MG/DL (ref 65–99)
HBA1C MFR BLD: 6.2 % (ref 4.8–5.6)
HDLC SERPL-MCNC: 46 MG/DL
LDLC SERPL CALC-MCNC: 87 MG/DL (ref 0–99)
POTASSIUM SERPL-SCNC: 4.5 MMOL/L (ref 3.5–5.2)
PROT SERPL-MCNC: 6.9 G/DL (ref 6–8.5)
SODIUM SERPL-SCNC: 139 MMOL/L (ref 134–144)
TRIGL SERPL-MCNC: 85 MG/DL (ref 0–149)
VLDLC SERPL CALC-MCNC: 16 MG/DL (ref 5–40)

## 2022-02-28 ENCOUNTER — OFFICE VISIT (OUTPATIENT)
Dept: FAMILY MEDICINE CLINIC | Facility: CLINIC | Age: 67
End: 2022-02-28

## 2022-02-28 VITALS
BODY MASS INDEX: 37.27 KG/M2 | HEART RATE: 100 BPM | TEMPERATURE: 98 F | DIASTOLIC BLOOD PRESSURE: 80 MMHG | WEIGHT: 281.2 LBS | RESPIRATION RATE: 17 BRPM | SYSTOLIC BLOOD PRESSURE: 140 MMHG | HEIGHT: 73 IN | OXYGEN SATURATION: 98 %

## 2022-02-28 DIAGNOSIS — F17.290 CIGAR SMOKER: ICD-10-CM

## 2022-02-28 DIAGNOSIS — I10 ESSENTIAL HYPERTENSION: ICD-10-CM

## 2022-02-28 DIAGNOSIS — R73.03 PRE-DIABETES: ICD-10-CM

## 2022-02-28 DIAGNOSIS — Z12.2 SCREENING FOR LUNG CANCER: ICD-10-CM

## 2022-02-28 DIAGNOSIS — M77.02 GOLFER'S ELBOW, LEFT: ICD-10-CM

## 2022-02-28 DIAGNOSIS — E66.01 MORBID (SEVERE) OBESITY DUE TO EXCESS CALORIES: ICD-10-CM

## 2022-02-28 DIAGNOSIS — Z87.891 HISTORY OF CIGARETTE SMOKING: ICD-10-CM

## 2022-02-28 DIAGNOSIS — Z00.00 ENCOUNTER FOR SUBSEQUENT ANNUAL WELLNESS VISIT IN MEDICARE PATIENT: Primary | ICD-10-CM
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
## 2022-02-28 PROCEDURE — 1159F MED LIST DOCD IN RCRD: CPT | Performed by: FAMILY MEDICINE

## 2022-02-28 PROCEDURE — 1170F FXNL STATUS ASSESSED: CPT | Performed by: FAMILY MEDICINE

## 2022-02-28 PROCEDURE — G0439 PPPS, SUBSEQ VISIT: HCPCS | Performed by: FAMILY MEDICINE

## 2022-02-28 PROCEDURE — 99214 OFFICE O/P EST MOD 30 MIN: CPT | Performed by: FAMILY MEDICINE

## 2022-02-28 RX ORDER — MELOXICAM 15 MG/1
TABLET ORAL
Qty: 30 TABLET | Refills: 1 | Status: SHIPPED | OUTPATIENT
Start: 2022-02-28 | End: 2022-10-12
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
## 2022-03-24 ENCOUNTER — HOSPITAL ENCOUNTER (OUTPATIENT)
Dept: CT IMAGING | Facility: HOSPITAL | Age: 67
Discharge: HOME | End: 2022-03-24
Admitting: FAMILY MEDICINE

## 2022-03-24 DIAGNOSIS — Z12.2 SCREENING FOR LUNG CANCER: ICD-10-CM

## 2022-03-24 DIAGNOSIS — Z87.891 HISTORY OF CIGARETTE SMOKING: ICD-10-CM

## 2022-03-24 DIAGNOSIS — F17.290 CIGAR SMOKER: ICD-10-CM

## 2022-03-24 DIAGNOSIS — Z00.00 ENCOUNTER FOR SUBSEQUENT ANNUAL WELLNESS VISIT IN MEDICARE PATIENT: ICD-10-CM

## 2022-03-24 PROCEDURE — 71271 CT THORAX LUNG CANCER SCR C-: CPT

## 2022-05-16 DIAGNOSIS — I89.0 LYMPHEDEMA: ICD-10-CM

## 2022-05-18 RX ORDER — FUROSEMIDE 20 MG/1
TABLET ORAL
Qty: 90 TABLET | Refills: 3 | Status: SHIPPED | OUTPATIENT
Start: 2022-05-18

## 2022-05-26 ENCOUNTER — TRANSCRIBE ORDERS (OUTPATIENT)
Dept: ADMINISTRATIVE | Facility: HOSPITAL | Age: 67
End: 2022-05-26

## 2022-05-26 ENCOUNTER — HOSPITAL ENCOUNTER (OUTPATIENT)
Dept: ULTRASOUND IMAGING | Facility: HOSPITAL | Age: 67
Discharge: HOME | End: 2022-05-26
Admitting: UROLOGY

## 2022-05-26 DIAGNOSIS — N44.00 TORSION OF RIGHT TESTICLE: ICD-10-CM

## 2022-05-26 DIAGNOSIS — N50.811 PAIN IN RIGHT TESTICLE: ICD-10-CM

## 2022-05-26 DIAGNOSIS — N50.811 PAIN IN BOTH TESTICLES: Primary | ICD-10-CM

## 2022-05-26 DIAGNOSIS — N50.812 PAIN IN BOTH TESTICLES: Primary | ICD-10-CM

## 2022-05-26 PROCEDURE — 93976 VASCULAR STUDY: CPT

## 2022-05-26 PROCEDURE — 76870 US EXAM SCROTUM: CPT

## 2022-06-20 DIAGNOSIS — M54.42 MIDLINE LOW BACK PAIN WITH LEFT-SIDED SCIATICA, UNSPECIFIED CHRONICITY: ICD-10-CM

## 2022-06-20 DIAGNOSIS — E78.2 HYPERLIPEMIA, MIXED: ICD-10-CM

## 2022-06-20 DIAGNOSIS — M25.552 LEFT HIP PAIN: ICD-10-CM

## 2022-06-21 RX ORDER — ATORVASTATIN CALCIUM 10 MG/1
10 TABLET, FILM COATED ORAL DAILY
Qty: 90 TABLET | Refills: 3 | Status: SHIPPED | OUTPATIENT
Start: 2022-06-21 | End: 2023-01-12 | Stop reason: SDUPTHER

## 2022-06-21 RX ORDER — CYCLOBENZAPRINE HCL 10 MG
10 TABLET ORAL 3 TIMES DAILY PRN
Qty: 180 TABLET | Refills: 0 | Status: SHIPPED | OUTPATIENT
Start: 2022-06-21 | End: 2022-09-25 | Stop reason: SDUPTHER

## 2022-08-02 DIAGNOSIS — I10 ESSENTIAL HYPERTENSION: ICD-10-CM

## 2022-08-02 DIAGNOSIS — E78.2 HYPERLIPEMIA, MIXED: Primary | ICD-10-CM

## 2022-08-02 DIAGNOSIS — R73.09 ELEVATED HEMOGLOBIN A1C: ICD-10-CM

## 2022-08-02 DIAGNOSIS — R73.03 PRE-DIABETES: ICD-10-CM

## 2022-08-02 NOTE — PROGRESS NOTES
Answers for HPI/ROS submitted by the patient on 7/8/2022  What is the primary reason for your visit?: High Blood Pressure    Chief Complaint  Hypertension     History of Present Illness  James Cleveland presents today for a follow up on his hypertension. James denies chest pain, shortness of breath, headaches, or dizziness. He reports that he occasionally checks his blood pressure at home and it's running approximately 140/84. He is taking his medication as prescribed.     Missed am meds yesterday including lisinopril and lasix    Interested in weight loss meds, hard time losing weight on his own.      Patient reports he has seen Dr. Oviedo, urology, about 6 weeks ago for infection of testicle.  At that time they did CT which showed cysts in multiple organs as well as COPD in his lungs.  We will contact that office for records.    Current Outpatient Medications on File Prior to Visit   Medication Sig   • aspirin (aspirin) 81 MG EC tablet Take 1 tablet by mouth Daily.   • atorvastatin (LIPITOR) 10 MG tablet Take 1 tablet by mouth Daily.   • Calcium Carb-Cholecalciferol (Os-Agusto Calcium + D3) 500-200 MG-UNIT tablet Take 1 tablet by mouth 2 (Two) Times a Day. (Patient taking differently: Take 1 tablet by mouth 2 (Two) Times a Day. Do not take after 9/17 for surgery)   • cetirizine (zyrTEC) 10 MG tablet Take 1 tablet by mouth Daily.   • cyclobenzaprine (FLEXERIL) 10 MG tablet Take 1 tablet by mouth 3 (Three) Times a Day As Needed for Muscle Spasms.   • famotidine (Pepcid) 40 MG tablet Take 1 tablet by mouth Daily. For hives and reflux   • FLUoxetine (PROzac) 20 MG capsule Take 1 capsule by mouth Daily.   • furosemide (LASIX) 20 MG tablet TAKE 1/2 TO 1 TABLET BY  MOUTH DAILY IF NEEDED FOR  LEG EDEMA   • gabapentin (NEURONTIN) 400 MG capsule Take 1 capsule by mouth 3 (Three) Times a Day.   • hydrOXYzine (ATARAX) 25 MG tablet Take 1 tablet by mouth Every 6 (Six) Hours As Needed for Itching, Allergies or Anxiety.   • lisinopril  "(PRINIVIL,ZESTRIL) 40 MG tablet Take 1 tablet by mouth Daily.   • sildenafil (REVATIO) 20 MG tablet Take 20 mg by mouth Daily. CAN TAKE UP  MG IF NEED   • meloxicam (MOBIC) 15 MG tablet 1/2 to 1 daily     No current facility-administered medications on file prior to visit.       Objective   Vital Signs:   /88   Pulse 90   Temp 98 °F (36.7 °C)   Resp 16   Ht 185.4 cm (73\")   Wt 127 kg (281 lb)   SpO2 98%   BMI 37.07 kg/m²       Physical Exam  Vitals and nursing note reviewed.   Constitutional:       General: He is not in acute distress.     Appearance: Normal appearance. He is well-developed. He is obese.   HENT:      Head: Normocephalic and atraumatic.   Eyes:      Conjunctiva/sclera: Conjunctivae normal.      Pupils: Pupils are equal, round, and reactive to light.   Neck:      Thyroid: No thyromegaly.      Vascular: No JVD.   Cardiovascular:      Rate and Rhythm: Normal rate and regular rhythm.      Heart sounds: Normal heart sounds. No murmur heard.  Pulmonary:      Breath sounds: Normal breath sounds. No wheezing or rales.   Musculoskeletal:         General: Normal range of motion.      Cervical back: Normal range of motion.      Comments: There is tenderness to palpation of the elbow just proximal to the medial epicondyles.  Negative Tinel's sign.  Normal range of motion and strength of the arm and hand   Lymphadenopathy:      Cervical: No cervical adenopathy.   Skin:     General: Skin is warm and dry.      Findings: No rash.   Neurological:      Mental Status: He is alert and oriented to person, place, and time.   Psychiatric:         Mood and Affect: Mood normal.         Behavior: Behavior normal.            No visits with results within 1 Day(s) from this visit.   Latest known visit with results is:   Orders Only on 08/02/2022   Component Date Value Ref Range Status   • WBC 08/02/2022 6.7  3.4 - 10.8 x10E3/uL Final   • RBC 08/02/2022 4.84  4.14 - 5.80 x10E6/uL Final   • Hemoglobin " 08/02/2022 14.7  13.0 - 17.7 g/dL Final   • Hematocrit 08/02/2022 43.4  37.5 - 51.0 % Final   • MCV 08/02/2022 90  79 - 97 fL Final   • MCH 08/02/2022 30.4  26.6 - 33.0 pg Final   • MCHC 08/02/2022 33.9  31.5 - 35.7 g/dL Final   • RDW 08/02/2022 14.0  11.6 - 15.4 % Final   • Platelets 08/02/2022 199  150 - 450 x10E3/uL Final   • Neutrophil Rel % 08/02/2022 59  Not Estab. % Final   • Lymphocyte Rel % 08/02/2022 28  Not Estab. % Final   • Monocyte Rel % 08/02/2022 8  Not Estab. % Final   • Eosinophil Rel % 08/02/2022 4  Not Estab. % Final   • Basophil Rel % 08/02/2022 1  Not Estab. % Final   • Neutrophils Absolute 08/02/2022 3.9  1.4 - 7.0 x10E3/uL Final   • Lymphocytes Absolute 08/02/2022 1.9  0.7 - 3.1 x10E3/uL Final   • Monocytes Absolute 08/02/2022 0.5  0.1 - 0.9 x10E3/uL Final   • Eosinophils Absolute 08/02/2022 0.3  0.0 - 0.4 x10E3/uL Final   • Basophils Absolute 08/02/2022 0.1  0.0 - 0.2 x10E3/uL Final   • Immature Granulocyte Rel % 08/02/2022 0  Not Estab. % Final   • Immature Grans Absolute 08/02/2022 0.0  0.0 - 0.1 x10E3/uL Final   • Glucose 08/02/2022 111 (A) 65 - 99 mg/dL Final   • BUN 08/02/2022 21  8 - 27 mg/dL Final   • Creatinine 08/02/2022 0.95  0.76 - 1.27 mg/dL Final   • EGFR Result 08/02/2022 88  >59 mL/min/1.73 Final   • BUN/Creatinine Ratio 08/02/2022 22  10 - 24 Final   • Sodium 08/02/2022 139  134 - 144 mmol/L Final   • Potassium 08/02/2022 4.5  3.5 - 5.2 mmol/L Final   • Chloride 08/02/2022 102  96 - 106 mmol/L Final   • Total CO2 08/02/2022 23  20 - 29 mmol/L Final   • Calcium 08/02/2022 9.3  8.6 - 10.2 mg/dL Final   • Total Protein 08/02/2022 6.3  6.0 - 8.5 g/dL Final   • Albumin 08/02/2022 4.0  3.8 - 4.8 g/dL Final   • Globulin 08/02/2022 2.3  1.5 - 4.5 g/dL Final   • A/G Ratio 08/02/2022 1.7  1.2 - 2.2 Final   • Total Bilirubin 08/02/2022 0.3  0.0 - 1.2 mg/dL Final   • Alkaline Phosphatase 08/02/2022 81  44 - 121 IU/L Final   • AST (SGOT) 08/02/2022 16  0 - 40 IU/L Final   • ALT (SGPT)  08/02/2022 19  0 - 44 IU/L Final   • Total Cholesterol 08/02/2022 133  100 - 199 mg/dL Final   • Triglycerides 08/02/2022 87  0 - 149 mg/dL Final   • HDL Cholesterol 08/02/2022 40  >39 mg/dL Final   • VLDL Cholesterol Agusto 08/02/2022 17  5 - 40 mg/dL Final   • LDL Chol Calc (NIH) 08/02/2022 76  0 - 99 mg/dL Final   • Chol/HDL Ratio 08/02/2022 3.3  0.0 - 5.0 ratio Final    Comment:                                   T. Chol/HDL Ratio                                              Men  Women                                1/2 Avg.Risk  3.4    3.3                                    Avg.Risk  5.0    4.4                                 2X Avg.Risk  9.6    7.1                                 3X Avg.Risk 23.4   11.0     • Hemoglobin A1C 08/02/2022 6.3 (A) 4.8 - 5.6 % Final    Comment:          Prediabetes: 5.7 - 6.4           Diabetes: >6.4           Glycemic control for adults with diabetes: <7.0         Lab Results   Component Value Date    BUN 21 08/02/2022    CREATININE 0.95 08/02/2022     08/02/2022    K 4.5 08/02/2022     08/02/2022    CALCIUM 9.3 08/02/2022    ALBUMIN 4.0 08/02/2022    BILITOT 0.3 08/02/2022    ALKPHOS 81 08/02/2022    AST 16 08/02/2022    ALT 19 08/02/2022    CHLPL 133 08/02/2022    TRIG 87 08/02/2022    HDL 40 08/02/2022    VLDL 17 08/02/2022    LDL 76 08/02/2022    WBC 6.7 08/02/2022    RBC 4.84 08/02/2022    HCT 43.4 08/02/2022    MCV 90 08/02/2022    MCH 30.4 08/02/2022        Lab Results   Component Value Date    HGBA1C 6.3 (H) 08/02/2022    HGBA1C 6.2 (H) 02/21/2022    HGBA1C 6.3 (H) 07/08/2021    HGBA1C 6.0 (H) 02/01/2021                Assessment and Plan    Diagnoses and all orders for this visit:    1. Essential hypertension (Primary)  -     metoprolol succinate XL (Toprol XL) 50 MG 24 hr tablet; Take 1 tablet by mouth Daily.  Dispense: 90 tablet; Refill: 3    2. Class 2 severe obesity due to excess calories with serious comorbidity and body mass index (BMI) of 37.0 to 37.9 in  adult ([facility])  Assessment & Plan:  Class 2 Severe Obesity (BMI >=35 and <=39.9). Obesity-related health conditions include the following: hypertension and dyslipidemias. Obesity is unchanged. BMI is is above average; BMI management plan is completed. We discussed portion control and increasing exercise.      Orders:  -     Semaglutide-Weight Management 0.25 MG/0.5ML solution auto-injector; Inject 0.25 mg under the skin into the appropriate area as directed 1 (One) Time Per Week.  Dispense: 2 mL; Refill: 0    3. Pre-diabetes  -     Semaglutide-Weight Management 0.25 MG/0.5ML solution auto-injector; Inject 0.25 mg under the skin into the appropriate area as directed 1 (One) Time Per Week.  Dispense: 2 mL; Refill: 0    4. Tobacco use      Hypertension above goal continuing current medications and adding metoprolol 50 mg.  Stressed need to be consistent with medications not missing any doses.    Obesity and prediabetes, A1c has been slowly rising and is now at 6.3.  Stressed need for weight reduction and low concentrated sweets diet.  After discussion for diet and increase physical activity also sending prescription for semaglutide/Ozempic for weight reduction and to reduce chance of progressing to full-blown diabetes  There are no discontinued medications.      Follow Up     Return in about 4 weeks (around 8/31/2022) for Recheck BP and weight.    Patient was given instructions and counseling regarding his condition or for health maintenance advice. Please see specific information pulled into the AVS if appropriate.           - levothyroxine   - TSH

## 2022-08-03 ENCOUNTER — OFFICE VISIT (OUTPATIENT)
Dept: FAMILY MEDICINE CLINIC | Facility: CLINIC | Age: 67
End: 2022-08-03

## 2022-08-03 VITALS
DIASTOLIC BLOOD PRESSURE: 88 MMHG | SYSTOLIC BLOOD PRESSURE: 168 MMHG | WEIGHT: 281 LBS | OXYGEN SATURATION: 98 % | BODY MASS INDEX: 37.24 KG/M2 | HEART RATE: 90 BPM | RESPIRATION RATE: 16 BRPM | TEMPERATURE: 98 F | HEIGHT: 73 IN

## 2022-08-03 DIAGNOSIS — R73.03 PRE-DIABETES: ICD-10-CM

## 2022-08-03 DIAGNOSIS — E66.01 CLASS 2 SEVERE OBESITY DUE TO EXCESS CALORIES WITH SERIOUS COMORBIDITY AND BODY MASS INDEX (BMI) OF 37.0 TO 37.9 IN ADULT: ICD-10-CM

## 2022-08-03 DIAGNOSIS — I10 ESSENTIAL HYPERTENSION: Primary | ICD-10-CM

## 2022-08-03 DIAGNOSIS — Z72.0 TOBACCO USE: ICD-10-CM

## 2022-08-03 LAB
ALBUMIN SERPL-MCNC: 4 G/DL (ref 3.8–4.8)
ALBUMIN/GLOB SERPL: 1.7 {RATIO} (ref 1.2–2.2)
ALP SERPL-CCNC: 81 IU/L (ref 44–121)
ALT SERPL-CCNC: 19 IU/L (ref 0–44)
AST SERPL-CCNC: 16 IU/L (ref 0–40)
BASOPHILS # BLD AUTO: 0.1 X10E3/UL (ref 0–0.2)
BASOPHILS NFR BLD AUTO: 1 %
BILIRUB SERPL-MCNC: 0.3 MG/DL (ref 0–1.2)
BUN SERPL-MCNC: 21 MG/DL (ref 8–27)
BUN/CREAT SERPL: 22 (ref 10–24)
CALCIUM SERPL-MCNC: 9.3 MG/DL (ref 8.6–10.2)
CHLORIDE SERPL-SCNC: 102 MMOL/L (ref 96–106)
CHOLEST SERPL-MCNC: 133 MG/DL (ref 100–199)
CHOLEST/HDLC SERPL: 3.3 RATIO (ref 0–5)
CO2 SERPL-SCNC: 23 MMOL/L (ref 20–29)
CREAT SERPL-MCNC: 0.95 MG/DL (ref 0.76–1.27)
EGFRCR SERPLBLD CKD-EPI 2021: 88 ML/MIN/1.73
EOSINOPHIL # BLD AUTO: 0.3 X10E3/UL (ref 0–0.4)
EOSINOPHIL NFR BLD AUTO: 4 %
ERYTHROCYTE [DISTWIDTH] IN BLOOD BY AUTOMATED COUNT: 14 % (ref 11.6–15.4)
GLOBULIN SER CALC-MCNC: 2.3 G/DL (ref 1.5–4.5)
GLUCOSE SERPL-MCNC: 111 MG/DL (ref 65–99)
HBA1C MFR BLD: 6.3 % (ref 4.8–5.6)
HCT VFR BLD AUTO: 43.4 % (ref 37.5–51)
HDLC SERPL-MCNC: 40 MG/DL
HGB BLD-MCNC: 14.7 G/DL (ref 13–17.7)
IMM GRANULOCYTES # BLD AUTO: 0 X10E3/UL (ref 0–0.1)
IMM GRANULOCYTES NFR BLD AUTO: 0 %
LDLC SERPL CALC-MCNC: 76 MG/DL (ref 0–99)
LYMPHOCYTES # BLD AUTO: 1.9 X10E3/UL (ref 0.7–3.1)
LYMPHOCYTES NFR BLD AUTO: 28 %
MCH RBC QN AUTO: 30.4 PG (ref 26.6–33)
MCHC RBC AUTO-ENTMCNC: 33.9 G/DL (ref 31.5–35.7)
MCV RBC AUTO: 90 FL (ref 79–97)
MONOCYTES # BLD AUTO: 0.5 X10E3/UL (ref 0.1–0.9)
MONOCYTES NFR BLD AUTO: 8 %
NEUTROPHILS # BLD AUTO: 3.9 X10E3/UL (ref 1.4–7)
NEUTROPHILS NFR BLD AUTO: 59 %
PLATELET # BLD AUTO: 199 X10E3/UL (ref 150–450)
POTASSIUM SERPL-SCNC: 4.5 MMOL/L (ref 3.5–5.2)
PROT SERPL-MCNC: 6.3 G/DL (ref 6–8.5)
RBC # BLD AUTO: 4.84 X10E6/UL (ref 4.14–5.8)
SODIUM SERPL-SCNC: 139 MMOL/L (ref 134–144)
TRIGL SERPL-MCNC: 87 MG/DL (ref 0–149)
VLDLC SERPL CALC-MCNC: 17 MG/DL (ref 5–40)
WBC # BLD AUTO: 6.7 X10E3/UL (ref 3.4–10.8)

## 2022-08-03 PROCEDURE — 99214 OFFICE O/P EST MOD 30 MIN: CPT | Performed by: FAMILY MEDICINE

## 2022-08-03 RX ORDER — METOPROLOL SUCCINATE 50 MG/1
50 TABLET, EXTENDED RELEASE ORAL DAILY
Qty: 90 TABLET | Refills: 3 | Status: SHIPPED | OUTPATIENT
Start: 2022-08-03 | End: 2022-10-12 | Stop reason: SDUPTHER

## 2022-08-03 NOTE — ASSESSMENT & PLAN NOTE
Class 2 Severe Obesity (BMI >=35 and <=39.9). Obesity-related health conditions include the following: hypertension and dyslipidemias. Obesity is unchanged. BMI is is above average; BMI management plan is completed. We discussed portion control and increasing exercise.

## 2022-08-05 DIAGNOSIS — S42.022S CLOSED DISPLACED FRACTURE OF SHAFT OF LEFT CLAVICLE, SEQUELA: ICD-10-CM

## 2022-08-05 DIAGNOSIS — G54.0 BRACHIAL PLEXUS NEUROPATHY: ICD-10-CM

## 2022-08-05 RX ORDER — GABAPENTIN 400 MG/1
CAPSULE ORAL
Qty: 270 CAPSULE | Refills: 0 | Status: SHIPPED | OUTPATIENT
Start: 2022-08-05 | End: 2023-01-01

## 2022-08-09 DIAGNOSIS — F41.9 ANXIETY: ICD-10-CM

## 2022-08-10 RX ORDER — FLUOXETINE HYDROCHLORIDE 20 MG/1
20 CAPSULE ORAL DAILY
Qty: 90 CAPSULE | Refills: 3 | Status: SHIPPED | OUTPATIENT
Start: 2022-08-10

## 2022-08-30 NOTE — PROGRESS NOTES
Chief Complaint  Hypertension     History of Present Illness  James Cleveland presents today for follow-up on hypertension and obesity.  Hypertension: Last visit added metoprolol succinate XL 50 mgAnd continued lisinopril,and furosemide for treatment.    Patient reports a mild headache and frontal sinus region for 2 weeks had fever one day only.  Did home Covid test 5 times, all negative but wife had positive test and similar headache.     Obesity prescription given for Ozempic at last visit but pharmacy has told it is on back order, have not been able to start yet.     Current Outpatient Medications on File Prior to Visit   Medication Sig   • aspirin (aspirin) 81 MG EC tablet Take 1 tablet by mouth Daily.   • atorvastatin (LIPITOR) 10 MG tablet Take 1 tablet by mouth Daily.   • Calcium Carb-Cholecalciferol (Os-Agusto Calcium + D3) 500-200 MG-UNIT tablet Take 1 tablet by mouth 2 (Two) Times a Day. (Patient taking differently: Take 1 tablet by mouth 2 (Two) Times a Day. Do not take after 9/17 for surgery)   • cetirizine (zyrTEC) 10 MG tablet Take 1 tablet by mouth Daily.   • cyclobenzaprine (FLEXERIL) 10 MG tablet Take 1 tablet by mouth 3 (Three) Times a Day As Needed for Muscle Spasms.   • famotidine (Pepcid) 40 MG tablet Take 1 tablet by mouth Daily. For hives and reflux   • FLUoxetine (PROzac) 20 MG capsule TAKE 1 CAPSULE BY MOUTH  DAILY   • furosemide (LASIX) 20 MG tablet TAKE 1/2 TO 1 TABLET BY  MOUTH DAILY IF NEEDED FOR  LEG EDEMA   • gabapentin (NEURONTIN) 400 MG capsule TAKE 1 CAPSULE BY MOUTH 3  TIMES DAILY   • hydrOXYzine (ATARAX) 25 MG tablet Take 1 tablet by mouth Every 6 (Six) Hours As Needed for Itching, Allergies or Anxiety.   • lisinopril (PRINIVIL,ZESTRIL) 40 MG tablet Take 1 tablet by mouth Daily.   • meloxicam (MOBIC) 15 MG tablet 1/2 to 1 daily   • metoprolol succinate XL (Toprol XL) 50 MG 24 hr tablet Take 1 tablet by mouth Daily.   • Semaglutide-Weight Management 0.25 MG/0.5ML solution auto-injector  "Inject 0.25 mg under the skin into the appropriate area as directed 1 (One) Time Per Week.   • sildenafil (REVATIO) 20 MG tablet Take 20 mg by mouth Daily. CAN TAKE UP  MG IF NEED     No current facility-administered medications on file prior to visit.       Objective   Vital Signs:   /80 (BP Location: Right arm, Patient Position: Sitting, Cuff Size: Adult)   Pulse 82   Temp 97.6 °F (36.4 °C) (Temporal)   Resp 18   Ht 185.4 cm (73\")   Wt 127 kg (280 lb 2 oz)   SpO2 99% Comment: room air  BMI 36.96 kg/m²       Physical Exam  Vitals and nursing note reviewed.   Constitutional:       General: He is not in acute distress.     Appearance: Normal appearance. He is well-developed. He is obese. He is not ill-appearing.   HENT:      Head: Normocephalic and atraumatic.   Eyes:      Conjunctiva/sclera: Conjunctivae normal.      Pupils: Pupils are equal, round, and reactive to light.   Neck:      Thyroid: No thyromegaly.      Vascular: No JVD.   Cardiovascular:      Rate and Rhythm: Normal rate and regular rhythm.      Heart sounds: Normal heart sounds. No murmur heard.  Pulmonary:      Effort: Pulmonary effort is normal.      Breath sounds: Normal breath sounds. No wheezing, rhonchi or rales.   Chest:      Chest wall: No tenderness.   Musculoskeletal:         General: Normal range of motion.      Cervical back: Normal range of motion.   Lymphadenopathy:      Cervical: No cervical adenopathy.   Skin:     General: Skin is warm and dry.      Findings: No rash.   Neurological:      Mental Status: He is alert and oriented to person, place, and time.   Psychiatric:         Mood and Affect: Mood normal.         Behavior: Behavior normal.            No visits with results within 1 Day(s) from this visit.   Latest known visit with results is:   Orders Only on 08/02/2022   Component Date Value Ref Range Status   • WBC 08/02/2022 6.7  3.4 - 10.8 x10E3/uL Final   • RBC 08/02/2022 4.84  4.14 - 5.80 x10E6/uL Final   • " Hemoglobin 08/02/2022 14.7  13.0 - 17.7 g/dL Final   • Hematocrit 08/02/2022 43.4  37.5 - 51.0 % Final   • MCV 08/02/2022 90  79 - 97 fL Final   • MCH 08/02/2022 30.4  26.6 - 33.0 pg Final   • MCHC 08/02/2022 33.9  31.5 - 35.7 g/dL Final   • RDW 08/02/2022 14.0  11.6 - 15.4 % Final   • Platelets 08/02/2022 199  150 - 450 x10E3/uL Final   • Neutrophil Rel % 08/02/2022 59  Not Estab. % Final   • Lymphocyte Rel % 08/02/2022 28  Not Estab. % Final   • Monocyte Rel % 08/02/2022 8  Not Estab. % Final   • Eosinophil Rel % 08/02/2022 4  Not Estab. % Final   • Basophil Rel % 08/02/2022 1  Not Estab. % Final   • Neutrophils Absolute 08/02/2022 3.9  1.4 - 7.0 x10E3/uL Final   • Lymphocytes Absolute 08/02/2022 1.9  0.7 - 3.1 x10E3/uL Final   • Monocytes Absolute 08/02/2022 0.5  0.1 - 0.9 x10E3/uL Final   • Eosinophils Absolute 08/02/2022 0.3  0.0 - 0.4 x10E3/uL Final   • Basophils Absolute 08/02/2022 0.1  0.0 - 0.2 x10E3/uL Final   • Immature Granulocyte Rel % 08/02/2022 0  Not Estab. % Final   • Immature Grans Absolute 08/02/2022 0.0  0.0 - 0.1 x10E3/uL Final   • Glucose 08/02/2022 111 (A) 65 - 99 mg/dL Final   • BUN 08/02/2022 21  8 - 27 mg/dL Final   • Creatinine 08/02/2022 0.95  0.76 - 1.27 mg/dL Final   • EGFR Result 08/02/2022 88  >59 mL/min/1.73 Final   • BUN/Creatinine Ratio 08/02/2022 22  10 - 24 Final   • Sodium 08/02/2022 139  134 - 144 mmol/L Final   • Potassium 08/02/2022 4.5  3.5 - 5.2 mmol/L Final   • Chloride 08/02/2022 102  96 - 106 mmol/L Final   • Total CO2 08/02/2022 23  20 - 29 mmol/L Final   • Calcium 08/02/2022 9.3  8.6 - 10.2 mg/dL Final   • Total Protein 08/02/2022 6.3  6.0 - 8.5 g/dL Final   • Albumin 08/02/2022 4.0  3.8 - 4.8 g/dL Final   • Globulin 08/02/2022 2.3  1.5 - 4.5 g/dL Final   • A/G Ratio 08/02/2022 1.7  1.2 - 2.2 Final   • Total Bilirubin 08/02/2022 0.3  0.0 - 1.2 mg/dL Final   • Alkaline Phosphatase 08/02/2022 81  44 - 121 IU/L Final   • AST (SGOT) 08/02/2022 16  0 - 40 IU/L Final   • ALT  (SGPT) 08/02/2022 19  0 - 44 IU/L Final   • Total Cholesterol 08/02/2022 133  100 - 199 mg/dL Final   • Triglycerides 08/02/2022 87  0 - 149 mg/dL Final   • HDL Cholesterol 08/02/2022 40  >39 mg/dL Final   • VLDL Cholesterol Agusto 08/02/2022 17  5 - 40 mg/dL Final   • LDL Chol Calc (NIH) 08/02/2022 76  0 - 99 mg/dL Final   • Chol/HDL Ratio 08/02/2022 3.3  0.0 - 5.0 ratio Final    Comment:                                   T. Chol/HDL Ratio                                              Men  Women                                1/2 Avg.Risk  3.4    3.3                                    Avg.Risk  5.0    4.4                                 2X Avg.Risk  9.6    7.1                                 3X Avg.Risk 23.4   11.0     • Hemoglobin A1C 08/02/2022 6.3 (A) 4.8 - 5.6 % Final    Comment:          Prediabetes: 5.7 - 6.4           Diabetes: >6.4           Glycemic control for adults with diabetes: <7.0         Lab Results   Component Value Date    BUN 21 08/02/2022    CREATININE 0.95 08/02/2022     08/02/2022    K 4.5 08/02/2022     08/02/2022    CALCIUM 9.3 08/02/2022    ALBUMIN 4.0 08/02/2022    BILITOT 0.3 08/02/2022    ALKPHOS 81 08/02/2022    AST 16 08/02/2022    ALT 19 08/02/2022    CHLPL 133 08/02/2022    TRIG 87 08/02/2022    HDL 40 08/02/2022    VLDL 17 08/02/2022    LDL 76 08/02/2022    WBC 6.7 08/02/2022    RBC 4.84 08/02/2022    HCT 43.4 08/02/2022    MCV 90 08/02/2022    MCH 30.4 08/02/2022        Lab Results   Component Value Date    HGBA1C 6.3 (H) 08/02/2022    HGBA1C 6.2 (H) 02/21/2022    HGBA1C 6.3 (H) 07/08/2021    HGBA1C 6.0 (H) 02/01/2021                Assessment and Plan    Diagnoses and all orders for this visit:    1. Essential hypertension (Primary)    2. Class 2 severe obesity due to excess calories with serious comorbidity and body mass index (BMI) of 36.0 to 36.9 in adult (HCC)  Assessment & Plan:  Patient's (Body mass index is 36.96 kg/m².) indicates that they are obese (BMI >30) with  health conditions that include hypertension, dyslipidemias and GERD . Weight is unchanged. BMI is is above average; BMI management plan is completed. We discussed portion control and increasing exercise.       3. Nonintractable headache, unspecified chronicity pattern, unspecified headache type      Hypertension improved continue current medications.  Obesity he will try to get Ozempic, possibly from a different pharmacy.  Recommend continued increase physical activity and reduce calorie diet we will check 1 month after starting anticipating increasing dose at that time.    Headache, likely due to COVID with false negative testing and sinus congestion.  Advised to use saline nasal spray, nasal steroid, Mucinex and may continue Vicks products as needed.    There are no discontinued medications.      Follow Up     Return in about 6 weeks (around 10/12/2022) for weight and BP.    Patient was given instructions and counseling regarding his condition or for health maintenance advice. Please see specific information pulled into the AVS if appropriate.

## 2022-08-31 ENCOUNTER — OFFICE VISIT (OUTPATIENT)
Dept: FAMILY MEDICINE CLINIC | Facility: CLINIC | Age: 67
End: 2022-08-31

## 2022-08-31 VITALS
RESPIRATION RATE: 18 BRPM | DIASTOLIC BLOOD PRESSURE: 80 MMHG | OXYGEN SATURATION: 99 % | SYSTOLIC BLOOD PRESSURE: 130 MMHG | HEIGHT: 73 IN | BODY MASS INDEX: 37.12 KG/M2 | WEIGHT: 280.13 LBS | HEART RATE: 82 BPM | TEMPERATURE: 97.6 F

## 2022-08-31 DIAGNOSIS — E66.01 CLASS 2 SEVERE OBESITY DUE TO EXCESS CALORIES WITH SERIOUS COMORBIDITY AND BODY MASS INDEX (BMI) OF 36.0 TO 36.9 IN ADULT: ICD-10-CM

## 2022-08-31 DIAGNOSIS — R51.9 NONINTRACTABLE HEADACHE, UNSPECIFIED CHRONICITY PATTERN, UNSPECIFIED HEADACHE TYPE: ICD-10-CM

## 2022-08-31 DIAGNOSIS — I10 ESSENTIAL HYPERTENSION: Primary | ICD-10-CM

## 2022-08-31 PROCEDURE — 99214 OFFICE O/P EST MOD 30 MIN: CPT | Performed by: FAMILY MEDICINE

## 2022-08-31 NOTE — ASSESSMENT & PLAN NOTE
Patient's (Body mass index is 36.96 kg/m².) indicates that they are obese (BMI >30) with health conditions that include hypertension, dyslipidemias and GERD . Weight is unchanged. BMI is is above average; BMI management plan is completed. We discussed portion control and increasing exercise.

## 2022-09-16 ENCOUNTER — PATIENT MESSAGE (OUTPATIENT)
Dept: FAMILY MEDICINE CLINIC | Facility: CLINIC | Age: 67
End: 2022-09-16

## 2022-09-25 DIAGNOSIS — M54.42 MIDLINE LOW BACK PAIN WITH LEFT-SIDED SCIATICA, UNSPECIFIED CHRONICITY: ICD-10-CM

## 2022-09-25 DIAGNOSIS — M25.552 LEFT HIP PAIN: ICD-10-CM

## 2022-09-26 ENCOUNTER — TELEPHONE (OUTPATIENT)
Dept: FAMILY MEDICINE CLINIC | Facility: CLINIC | Age: 67
End: 2022-09-26

## 2022-09-26 DIAGNOSIS — E66.01 CLASS 2 SEVERE OBESITY DUE TO EXCESS CALORIES WITH SERIOUS COMORBIDITY AND BODY MASS INDEX (BMI) OF 36.0 TO 36.9 IN ADULT: Primary | ICD-10-CM

## 2022-09-26 RX ORDER — CYCLOBENZAPRINE HCL 10 MG
10 TABLET ORAL 3 TIMES DAILY PRN
Qty: 180 TABLET | Refills: 0 | Status: SHIPPED | OUTPATIENT
Start: 2022-09-26 | End: 2022-10-05

## 2022-09-26 NOTE — TELEPHONE ENCOUNTER
Please inform patient I am sending Rybelsus which is the oral version of semaglutide.  Is supposed to be started at 3 mg daily for the first month and then increase to 7 mg.  I went ahead and sent in a 90-day supply since he was requesting it be sent to mail order and we can discuss how we can increase it when he returns next month.

## 2022-09-26 NOTE — TELEPHONE ENCOUNTER
----- Message from James Cleveland sent at 9/26/2022  2:01 PM EDT -----  Regarding: Weight lose medicine   Semaglutide is on back order at [facility] mail order and my Pharmacy didn't say why just they couldn't get it .

## 2022-09-28 DIAGNOSIS — I10 ESSENTIAL HYPERTENSION: ICD-10-CM

## 2022-09-29 RX ORDER — LISINOPRIL 40 MG/1
40 TABLET ORAL DAILY
Qty: 90 TABLET | Refills: 3 | Status: SHIPPED | OUTPATIENT
Start: 2022-09-29

## 2022-10-05 DIAGNOSIS — M25.552 LEFT HIP PAIN: ICD-10-CM

## 2022-10-05 DIAGNOSIS — M54.42 MIDLINE LOW BACK PAIN WITH LEFT-SIDED SCIATICA, UNSPECIFIED CHRONICITY: ICD-10-CM

## 2022-10-05 RX ORDER — CYCLOBENZAPRINE HCL 10 MG
TABLET ORAL
Qty: 180 TABLET | Refills: 0 | Status: SHIPPED | OUTPATIENT
Start: 2022-10-05 | End: 2023-02-15 | Stop reason: SDUPTHER

## 2022-10-10 NOTE — PROGRESS NOTES
Answers for HPI/ROS submitted by the patient on 10/5/2022  What is the primary reason for your visit?: High Blood Pressure  Chronicity: recurrent  Onset: more than 1 year ago  Progression since onset: waxing and waning  Condition status: resistant  malaise/fatigue: Yes  peripheral edema: Yes  Agents associated with hypertension: no associated agents  CAD risks: family history  Compliance problems: exercise      Chief Complaint  Hypertension and Weight Loss     History of Present Illness    James Cleveland presents today for obesity and hypertension.   Patient check blood pressure at home once weekly. Average 150/80    Patient states that semaglutide is too expensive. He reports watching sugars in diet and adding more vegetables.   Compliance problems include exercise.     Wt Readings from Last 3 Encounters:   10/12/22 125 kg (276 lb 6.4 oz)   08/31/22 127 kg (280 lb 2 oz)   08/03/22 127 kg (281 lb)         Current Outpatient Medications on File Prior to Visit   Medication Sig   • aspirin (aspirin) 81 MG EC tablet Take 1 tablet by mouth Daily.   • atorvastatin (LIPITOR) 10 MG tablet Take 1 tablet by mouth Daily.   • Calcium Carb-Cholecalciferol (Os-Agusto Calcium + D3) 500-200 MG-UNIT tablet Take 1 tablet by mouth 2 (Two) Times a Day.   • cetirizine (zyrTEC) 10 MG tablet Take 1 tablet by mouth Daily.   • cyclobenzaprine (FLEXERIL) 10 MG tablet TAKE 1 TABLET BY MOUTH 3  TIMES DAILY AS NEEDED FOR  MUSCLE SPASM(S)   • famotidine (Pepcid) 40 MG tablet Take 1 tablet by mouth Daily. For hives and reflux   • FLUoxetine (PROzac) 20 MG capsule TAKE 1 CAPSULE BY MOUTH  DAILY   • furosemide (LASIX) 20 MG tablet TAKE 1/2 TO 1 TABLET BY  MOUTH DAILY IF NEEDED FOR  LEG EDEMA   • gabapentin (NEURONTIN) 400 MG capsule TAKE 1 CAPSULE BY MOUTH 3  TIMES DAILY   • hydrOXYzine (ATARAX) 25 MG tablet Take 1 tablet by mouth Every 6 (Six) Hours As Needed for Itching, Allergies or Anxiety.   • lisinopril (PRINIVIL,ZESTRIL) 40 MG tablet TAKE 1  "TABLET BY MOUTH  DAILY   • sildenafil (REVATIO) 20 MG tablet Take 20 mg by mouth Daily. CAN TAKE UP  MG IF NEED   • [DISCONTINUED] metoprolol succinate XL (Toprol XL) 50 MG 24 hr tablet Take 1 tablet by mouth Daily.   • meloxicam (MOBIC) 15 MG tablet 1/2 to 1 daily   • Semaglutide 3 MG tablet Take 1 tablet by mouth Daily.     No current facility-administered medications on file prior to visit.       Objective   Vital Signs:   /88 (BP Location: Right arm, Patient Position: Sitting, Cuff Size: Adult)   Pulse 75   Temp 97.8 °F (36.6 °C) (Temporal)   Resp 18   Ht 180.3 cm (71\")   Wt 125 kg (276 lb 6.4 oz)   SpO2 98% Comment: Room air  BMI 38.55 kg/m²       Physical Exam       No visits with results within 1 Day(s) from this visit.   Latest known visit with results is:   Orders Only on 08/02/2022   Component Date Value Ref Range Status   • WBC 08/02/2022 6.7  3.4 - 10.8 x10E3/uL Final   • RBC 08/02/2022 4.84  4.14 - 5.80 x10E6/uL Final   • Hemoglobin 08/02/2022 14.7  13.0 - 17.7 g/dL Final   • Hematocrit 08/02/2022 43.4  37.5 - 51.0 % Final   • MCV 08/02/2022 90  79 - 97 fL Final   • MCH 08/02/2022 30.4  26.6 - 33.0 pg Final   • MCHC 08/02/2022 33.9  31.5 - 35.7 g/dL Final   • RDW 08/02/2022 14.0  11.6 - 15.4 % Final   • Platelets 08/02/2022 199  150 - 450 x10E3/uL Final   • Neutrophil Rel % 08/02/2022 59  Not Estab. % Final   • Lymphocyte Rel % 08/02/2022 28  Not Estab. % Final   • Monocyte Rel % 08/02/2022 8  Not Estab. % Final   • Eosinophil Rel % 08/02/2022 4  Not Estab. % Final   • Basophil Rel % 08/02/2022 1  Not Estab. % Final   • Neutrophils Absolute 08/02/2022 3.9  1.4 - 7.0 x10E3/uL Final   • Lymphocytes Absolute 08/02/2022 1.9  0.7 - 3.1 x10E3/uL Final   • Monocytes Absolute 08/02/2022 0.5  0.1 - 0.9 x10E3/uL Final   • Eosinophils Absolute 08/02/2022 0.3  0.0 - 0.4 x10E3/uL Final   • Basophils Absolute 08/02/2022 0.1  0.0 - 0.2 x10E3/uL Final   • Immature Granulocyte Rel % 08/02/2022 0  Not " Estab. % Final   • Immature Grans Absolute 08/02/2022 0.0  0.0 - 0.1 x10E3/uL Final   • Glucose 08/02/2022 111 (H)  65 - 99 mg/dL Final   • BUN 08/02/2022 21  8 - 27 mg/dL Final   • Creatinine 08/02/2022 0.95  0.76 - 1.27 mg/dL Final   • EGFR Result 08/02/2022 88  >59 mL/min/1.73 Final   • BUN/Creatinine Ratio 08/02/2022 22  10 - 24 Final   • Sodium 08/02/2022 139  134 - 144 mmol/L Final   • Potassium 08/02/2022 4.5  3.5 - 5.2 mmol/L Final   • Chloride 08/02/2022 102  96 - 106 mmol/L Final   • Total CO2 08/02/2022 23  20 - 29 mmol/L Final   • Calcium 08/02/2022 9.3  8.6 - 10.2 mg/dL Final   • Total Protein 08/02/2022 6.3  6.0 - 8.5 g/dL Final   • Albumin 08/02/2022 4.0  3.8 - 4.8 g/dL Final   • Globulin 08/02/2022 2.3  1.5 - 4.5 g/dL Final   • A/G Ratio 08/02/2022 1.7  1.2 - 2.2 Final   • Total Bilirubin 08/02/2022 0.3  0.0 - 1.2 mg/dL Final   • Alkaline Phosphatase 08/02/2022 81  44 - 121 IU/L Final   • AST (SGOT) 08/02/2022 16  0 - 40 IU/L Final   • ALT (SGPT) 08/02/2022 19  0 - 44 IU/L Final   • Total Cholesterol 08/02/2022 133  100 - 199 mg/dL Final   • Triglycerides 08/02/2022 87  0 - 149 mg/dL Final   • HDL Cholesterol 08/02/2022 40  >39 mg/dL Final   • VLDL Cholesterol Agusto 08/02/2022 17  5 - 40 mg/dL Final   • LDL Chol Calc (NIH) 08/02/2022 76  0 - 99 mg/dL Final   • Chol/HDL Ratio 08/02/2022 3.3  0.0 - 5.0 ratio Final    Comment:                                   T. Chol/HDL Ratio                                              Men  Women                                1/2 Avg.Risk  3.4    3.3                                    Avg.Risk  5.0    4.4                                 2X Avg.Risk  9.6    7.1                                 3X Avg.Risk 23.4   11.0     • Hemoglobin A1C 08/02/2022 6.3 (H)  4.8 - 5.6 % Final    Comment:          Prediabetes: 5.7 - 6.4           Diabetes: >6.4           Glycemic control for adults with diabetes: <7.0         Lab Results   Component Value Date    BUN 21 08/02/2022     CREATININE 0.95 08/02/2022     08/02/2022    K 4.5 08/02/2022     08/02/2022    CALCIUM 9.3 08/02/2022    ALBUMIN 4.0 08/02/2022    BILITOT 0.3 08/02/2022    ALKPHOS 81 08/02/2022    AST 16 08/02/2022    ALT 19 08/02/2022    CHLPL 133 08/02/2022    TRIG 87 08/02/2022    HDL 40 08/02/2022    VLDL 17 08/02/2022    LDL 76 08/02/2022    WBC 6.7 08/02/2022    RBC 4.84 08/02/2022    HCT 43.4 08/02/2022    MCV 90 08/02/2022    MCH 30.4 08/02/2022        Lab Results   Component Value Date    HGBA1C 6.3 (H) 08/02/2022    HGBA1C 6.2 (H) 02/21/2022    HGBA1C 6.3 (H) 07/08/2021    HGBA1C 6.0 (H) 02/01/2021                Assessment and Plan    Diagnoses and all orders for this visit:    1. Essential hypertension (Primary)  -     metoprolol succinate XL (Toprol XL) 100 MG 24 hr tablet; Take 1 tablet by mouth Daily.  Dispense: 90 tablet; Refill: 1  -     Comprehensive Metabolic Panel; Future  -     MicroAlbumin, Urine, Random - Urine, Clean Catch; Future    2. Class 2 severe obesity due to excess calories with serious comorbidity and body mass index (BMI) of 38.0 to 38.9 in adult (HCC)    3. Tobacco use    4. Hyperlipemia, mixed  -     Lipid Panel; Future    5. Prediabetes  -     Hemoglobin A1c; Future    Hypertension, upper limit of goal today but home readings are remaining elevated.  Discussed treatment options.  Will increase metoprolol to 100 mg daily and continue lisinopril 40 mg daily    Obesity and prediabetes, could not afford semaglutide.  He has lost 5 pounds since August.  Patient is more physically active doing some  jobs as well and has cut out cakes and cookies.  Encouraged to continue to work on weight reduction.  Advised if he can check with insurance and find an affordable weight loss medication I will review any available medications for appropriatness.    James Cleveland declines flu vaccine at this time. He will have along with bivalent COVID booster at [facility], already  scheduled in one week.      Medications Discontinued During This Encounter   Medication Reason   • metoprolol succinate XL (Toprol XL) 50 MG 24 hr tablet Reorder         Follow Up     Return in about 3 months (around 1/12/2023) for Recheck, htn, cholesterol and weight, with Labs.    Patient was given instructions and counseling regarding his condition or for health maintenance advice. Please see specific information pulled into the AVS if appropriate.

## 2022-10-12 ENCOUNTER — OFFICE VISIT (OUTPATIENT)
Dept: FAMILY MEDICINE CLINIC | Facility: CLINIC | Age: 67
End: 2022-10-12

## 2022-10-12 VITALS
BODY MASS INDEX: 38.69 KG/M2 | WEIGHT: 276.4 LBS | RESPIRATION RATE: 18 BRPM | TEMPERATURE: 97.8 F | HEIGHT: 71 IN | SYSTOLIC BLOOD PRESSURE: 140 MMHG | HEART RATE: 75 BPM | OXYGEN SATURATION: 98 % | DIASTOLIC BLOOD PRESSURE: 88 MMHG

## 2022-10-12 DIAGNOSIS — R73.03 PREDIABETES: ICD-10-CM

## 2022-10-12 DIAGNOSIS — Z72.0 TOBACCO USE: ICD-10-CM

## 2022-10-12 DIAGNOSIS — E78.2 HYPERLIPEMIA, MIXED: ICD-10-CM

## 2022-10-12 DIAGNOSIS — E66.01 CLASS 2 SEVERE OBESITY DUE TO EXCESS CALORIES WITH SERIOUS COMORBIDITY AND BODY MASS INDEX (BMI) OF 38.0 TO 38.9 IN ADULT: ICD-10-CM

## 2022-10-12 DIAGNOSIS — I10 ESSENTIAL HYPERTENSION: Primary | ICD-10-CM

## 2022-10-12 PROCEDURE — 99214 OFFICE O/P EST MOD 30 MIN: CPT | Performed by: FAMILY MEDICINE

## 2022-10-12 RX ORDER — METOPROLOL SUCCINATE 100 MG/1
100 TABLET, EXTENDED RELEASE ORAL DAILY
Qty: 90 TABLET | Refills: 1 | Status: SHIPPED | OUTPATIENT
Start: 2022-10-12 | End: 2023-02-15 | Stop reason: SDUPTHER

## 2022-12-01 DIAGNOSIS — L50.9 URTICARIA: ICD-10-CM

## 2022-12-01 DIAGNOSIS — K21.9 GASTROESOPHAGEAL REFLUX DISEASE, UNSPECIFIED WHETHER ESOPHAGITIS PRESENT: ICD-10-CM

## 2022-12-01 RX ORDER — FAMOTIDINE 40 MG/1
40 TABLET, FILM COATED ORAL DAILY
Qty: 90 TABLET | Refills: 3 | Status: SHIPPED | OUTPATIENT
Start: 2022-12-01

## 2022-12-14 ENCOUNTER — OFFICE VISIT (OUTPATIENT)
Dept: FAMILY MEDICINE CLINIC | Facility: CLINIC | Age: 67
End: 2022-12-14

## 2022-12-14 ENCOUNTER — HOSPITAL ENCOUNTER (OUTPATIENT)
Dept: GENERAL RADIOLOGY | Facility: HOSPITAL | Age: 67
Discharge: HOME | End: 2022-12-14
Admitting: NURSE PRACTITIONER

## 2022-12-14 VITALS
WEIGHT: 273 LBS | DIASTOLIC BLOOD PRESSURE: 94 MMHG | TEMPERATURE: 97.8 F | BODY MASS INDEX: 38.22 KG/M2 | HEIGHT: 71 IN | RESPIRATION RATE: 16 BRPM | SYSTOLIC BLOOD PRESSURE: 180 MMHG | OXYGEN SATURATION: 96 % | HEART RATE: 70 BPM

## 2022-12-14 DIAGNOSIS — I10 ESSENTIAL HYPERTENSION: ICD-10-CM

## 2022-12-14 DIAGNOSIS — M54.16 LUMBAR RADICULAR PAIN: Primary | ICD-10-CM

## 2022-12-14 DIAGNOSIS — M54.16 LUMBAR RADICULAR PAIN: ICD-10-CM

## 2022-12-14 PROCEDURE — 72110 X-RAY EXAM L-2 SPINE 4/>VWS: CPT

## 2022-12-14 PROCEDURE — 99214 OFFICE O/P EST MOD 30 MIN: CPT | Performed by: NURSE PRACTITIONER

## 2022-12-14 RX ORDER — AMLODIPINE BESYLATE 5 MG/1
5 TABLET ORAL DAILY
Qty: 30 TABLET | Refills: 1 | Status: SHIPPED | OUTPATIENT
Start: 2022-12-14 | End: 2023-01-10
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
## 2022-12-29 DIAGNOSIS — S42.022S CLOSED DISPLACED FRACTURE OF SHAFT OF LEFT CLAVICLE, SEQUELA: ICD-10-CM

## 2022-12-29 DIAGNOSIS — G54.0 BRACHIAL PLEXUS NEUROPATHY: ICD-10-CM

## 2023-01-01 RX ORDER — GABAPENTIN 400 MG/1
CAPSULE ORAL
Qty: 270 CAPSULE | Refills: 3 | Status: SHIPPED | OUTPATIENT
Start: 2023-01-01 | End: 2023-02-15 | Stop reason: SDUPTHER

## 2023-01-10 RX ORDER — AMLODIPINE BESYLATE 5 MG/1
TABLET ORAL
Qty: 30 TABLET | Refills: 0 | Status: SHIPPED | OUTPATIENT
Start: 2023-01-10 | End: 2023-03-07 | Stop reason: SDUPTHER

## 2023-01-12 DIAGNOSIS — E78.2 HYPERLIPEMIA, MIXED: ICD-10-CM

## 2023-01-13 ENCOUNTER — OFFICE VISIT (OUTPATIENT)
Dept: FAMILY MEDICINE CLINIC | Facility: CLINIC | Age: 68
End: 2023-01-13
Payer: MEDICARE

## 2023-01-13 VITALS
BODY MASS INDEX: 38.8 KG/M2 | OXYGEN SATURATION: 96 % | HEART RATE: 83 BPM | DIASTOLIC BLOOD PRESSURE: 82 MMHG | HEIGHT: 71 IN | WEIGHT: 277.13 LBS | RESPIRATION RATE: 18 BRPM | SYSTOLIC BLOOD PRESSURE: 136 MMHG | TEMPERATURE: 98.2 F

## 2023-01-13 DIAGNOSIS — Z13.6 SCREENING FOR ABDOMINAL AORTIC ANEURYSM: ICD-10-CM

## 2023-01-13 DIAGNOSIS — R73.03 PREDIABETES: ICD-10-CM

## 2023-01-13 DIAGNOSIS — E66.01 CLASS 2 SEVERE OBESITY DUE TO EXCESS CALORIES WITH SERIOUS COMORBIDITY AND BODY MASS INDEX (BMI) OF 38.0 TO 38.9 IN ADULT: ICD-10-CM

## 2023-01-13 DIAGNOSIS — Z72.0 TOBACCO USE: ICD-10-CM

## 2023-01-13 DIAGNOSIS — I10 ESSENTIAL HYPERTENSION: Primary | ICD-10-CM

## 2023-01-13 DIAGNOSIS — M54.42 ACUTE LEFT-SIDED LOW BACK PAIN WITH LEFT-SIDED SCIATICA: ICD-10-CM

## 2023-01-13 PROBLEM — M54.40 ACUTE LEFT-SIDED LOW BACK PAIN WITH SCIATICA: Status: ACTIVE | Noted: 2023-01-13

## 2023-01-13 PROCEDURE — 99214 OFFICE O/P EST MOD 30 MIN: CPT | Performed by: FAMILY MEDICINE

## 2023-01-13 RX ORDER — ATORVASTATIN CALCIUM 10 MG/1
10 TABLET, FILM COATED ORAL DAILY
Qty: 90 TABLET | Refills: 3 | Status: SHIPPED | OUTPATIENT
Start: 2023-01-13

## 2023-01-13 NOTE — ASSESSMENT & PLAN NOTE
Patient's (Body mass index is 38.65 kg/m².) indicates that they are obese (BMI >30) with health conditions that include hypertension and dyslipidemias . Weight is unchanged. BMI is is above average; BMI management plan is completed. We discussed portion control and increasing exercise.
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
## 2023-02-15 DIAGNOSIS — M25.552 LEFT HIP PAIN: ICD-10-CM

## 2023-02-15 DIAGNOSIS — G54.0 BRACHIAL PLEXUS NEUROPATHY: ICD-10-CM

## 2023-02-15 DIAGNOSIS — I10 ESSENTIAL HYPERTENSION: ICD-10-CM

## 2023-02-15 DIAGNOSIS — S42.022S CLOSED DISPLACED FRACTURE OF SHAFT OF LEFT CLAVICLE, SEQUELA: ICD-10-CM

## 2023-02-15 DIAGNOSIS — M54.42 MIDLINE LOW BACK PAIN WITH LEFT-SIDED SCIATICA, UNSPECIFIED CHRONICITY: ICD-10-CM

## 2023-02-15 RX ORDER — METOPROLOL SUCCINATE 100 MG/1
100 TABLET, EXTENDED RELEASE ORAL DAILY
Qty: 90 TABLET | Refills: 3 | Status: SHIPPED | OUTPATIENT
Start: 2023-02-15

## 2023-02-15 RX ORDER — CYCLOBENZAPRINE HCL 10 MG
10 TABLET ORAL 3 TIMES DAILY PRN
Qty: 180 TABLET | Refills: 1 | Status: SHIPPED | OUTPATIENT
Start: 2023-02-15

## 2023-02-15 RX ORDER — GABAPENTIN 400 MG/1
400 CAPSULE ORAL 3 TIMES DAILY
Qty: 270 CAPSULE | Refills: 3 | Status: SHIPPED | OUTPATIENT
Start: 2023-02-15

## 2023-02-16 ENCOUNTER — TELEPHONE (OUTPATIENT)
Dept: FAMILY MEDICINE CLINIC | Facility: CLINIC | Age: 68
End: 2023-02-16
Payer: MEDICARE

## 2023-02-16 NOTE — TELEPHONE ENCOUNTER
"Referral routed back by scheduling hub stating the \"vas aaa\" is not performed at any [facility] location. Asking for this to be changed to an ultrasound aaa. Is it ok for me to change?  "
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
## 2023-03-07 ENCOUNTER — OFFICE VISIT (OUTPATIENT)
Dept: FAMILY MEDICINE CLINIC | Facility: CLINIC | Age: 68
End: 2023-03-07
Payer: MEDICARE

## 2023-03-07 VITALS
RESPIRATION RATE: 18 BRPM | WEIGHT: 271.25 LBS | TEMPERATURE: 97.8 F | HEART RATE: 85 BPM | BODY MASS INDEX: 37.98 KG/M2 | HEIGHT: 71 IN | SYSTOLIC BLOOD PRESSURE: 132 MMHG | DIASTOLIC BLOOD PRESSURE: 74 MMHG | OXYGEN SATURATION: 95 %

## 2023-03-07 DIAGNOSIS — Z87.891 HISTORY OF CIGARETTE SMOKING: ICD-10-CM

## 2023-03-07 DIAGNOSIS — Z72.0 TOBACCO USE: ICD-10-CM

## 2023-03-07 DIAGNOSIS — E66.01 CLASS 2 SEVERE OBESITY DUE TO EXCESS CALORIES WITH SERIOUS COMORBIDITY AND BODY MASS INDEX (BMI) OF 37.0 TO 37.9 IN ADULT: ICD-10-CM

## 2023-03-07 DIAGNOSIS — I10 ESSENTIAL HYPERTENSION: ICD-10-CM

## 2023-03-07 DIAGNOSIS — J43.9 PULMONARY EMPHYSEMA, UNSPECIFIED EMPHYSEMA TYPE: ICD-10-CM

## 2023-03-07 DIAGNOSIS — Z00.00 MEDICARE ANNUAL WELLNESS VISIT, SUBSEQUENT: Primary | ICD-10-CM

## 2023-03-07 DIAGNOSIS — Z12.2 SCREENING FOR LUNG CANCER: ICD-10-CM

## 2023-03-07 DIAGNOSIS — F17.290 CIGAR SMOKER MOTIVATED TO QUIT: ICD-10-CM

## 2023-03-07 DIAGNOSIS — J40 BRONCHITIS: ICD-10-CM

## 2023-03-07 DIAGNOSIS — R05.1 ACUTE COUGH: ICD-10-CM

## 2023-03-07 PROCEDURE — 3078F DIAST BP <80 MM HG: CPT | Performed by: FAMILY MEDICINE

## 2023-03-07 PROCEDURE — 99214 OFFICE O/P EST MOD 30 MIN: CPT | Performed by: FAMILY MEDICINE

## 2023-03-07 PROCEDURE — 1159F MED LIST DOCD IN RCRD: CPT | Performed by: FAMILY MEDICINE

## 2023-03-07 PROCEDURE — G0439 PPPS, SUBSEQ VISIT: HCPCS | Performed by: FAMILY MEDICINE

## 2023-03-07 PROCEDURE — 1160F RVW MEDS BY RX/DR IN RCRD: CPT | Performed by: FAMILY MEDICINE

## 2023-03-07 PROCEDURE — 99406 BEHAV CHNG SMOKING 3-10 MIN: CPT | Performed by: FAMILY MEDICINE

## 2023-03-07 PROCEDURE — 1170F FXNL STATUS ASSESSED: CPT | Performed by: FAMILY MEDICINE

## 2023-03-07 PROCEDURE — 3075F SYST BP GE 130 - 139MM HG: CPT | Performed by: FAMILY MEDICINE

## 2023-03-07 RX ORDER — AMLODIPINE BESYLATE 5 MG/1
5 TABLET ORAL DAILY
Qty: 90 TABLET | Refills: 3 | Status: SHIPPED | OUTPATIENT
Start: 2023-03-07

## 2023-03-07 RX ORDER — ALBUTEROL SULFATE 90 UG/1
2 AEROSOL, METERED RESPIRATORY (INHALATION) EVERY 4 HOURS PRN
Qty: 8 G | Refills: 2 | Status: SHIPPED | OUTPATIENT
Start: 2023-03-07

## 2023-03-07 RX ORDER — AZITHROMYCIN 250 MG/1
TABLET, FILM COATED ORAL
Qty: 6 TABLET | Refills: 0 | Status: SHIPPED | OUTPATIENT
Start: 2023-03-07

## 2023-03-07 NOTE — ASSESSMENT & PLAN NOTE
Patient's (Body mass index is 37.83 kg/m².) indicates that they are obese (BMI >30) with health conditions that include hypertension and dyslipidemias . Weight is unchanged. BMI  is above average; BMI management plan is completed. We discussed portion control and increasing exercise.

## 2023-04-10 ENCOUNTER — TELEPHONE (OUTPATIENT)
Dept: FAMILY MEDICINE CLINIC | Facility: CLINIC | Age: 68
End: 2023-04-10

## 2023-04-10 DIAGNOSIS — J43.9 PULMONARY EMPHYSEMA, UNSPECIFIED EMPHYSEMA TYPE: ICD-10-CM

## 2023-04-10 DIAGNOSIS — J40 BRONCHITIS: ICD-10-CM

## 2023-04-10 RX ORDER — AZITHROMYCIN 250 MG/1
TABLET, FILM COATED ORAL
Qty: 6 TABLET | Refills: 0 | Status: SHIPPED | OUTPATIENT
Start: 2023-04-10 | End: 2023-04-26

## 2023-04-10 NOTE — TELEPHONE ENCOUNTER
Sending in azithromycin as requested, please inform patient if symptoms are not improving in a week or if symptoms worsen at anytime should schedule appointment for evaluation.  Ashwini Ndiaye MD

## 2023-04-10 NOTE — TELEPHONE ENCOUNTER
Caller: James Cleveland    Relationship: Self    Best call back number: [phone]    What medication are you requesting: Z PACK    What are your current symptoms:COUGH, CONGESTION    How long have you been experiencing symptoms: 1 WEEK     Have you had these symptoms before:    [x] Yes  [] No    Have you been treated for these symptoms before:   [x] Yes  [] No    If a prescription is needed, what is your preferred pharmacy and phone number: [facility] - [address] - [phone] [facility] [phone] [facility]     Additional notes:

## 2023-04-18 DIAGNOSIS — I89.0 LYMPHEDEMA: ICD-10-CM

## 2023-04-20 RX ORDER — FUROSEMIDE 20 MG/1
TABLET ORAL
Qty: 90 TABLET | Refills: 3 | Status: SHIPPED | OUTPATIENT
Start: 2023-04-20

## 2023-04-25 NOTE — PROGRESS NOTES
Chief Complaint  Follow-up (Urgent Care) and Pneumonia    History of Present Illness  James Cleveland presents today for urgent care follow up. He was seen at [facility] on 4/17/2023.    He had a chest x-ray completed at [facility], and was diagnosed with pneumonia.  Medications prescribed Augmentin for 10 days, will complete tomorrow and 5 days of Prednisone already completed.       Pneumonia:   Patient complains of follow up on pneumonia.   Patient describes symptoms of shortness of breath off and on, cough, fatigue, headache, sore throat, sputum production and wheezing.   Symptoms began 1 week ago and are gradually improving since that time.   Patient denies chest pain or weight loss.   Current treatment: Augmentin and Prednisone.       Patient had been prescribed scribed azithromycin and albuterol with a bronchitic infection on March 7.  He called in requesting a refill on April 10 on the azithromycin due to return of cough.  Using albuterol about 3 times daily.     Continue cigar use 1 about every other day, trying to quit  Quit cigarettes about 3 years ago.       Current Outpatient Medications on File Prior to Visit   Medication Sig   • albuterol sulfate  (90 Base) MCG/ACT inhaler Inhale 2 puffs Every 4 (Four) Hours As Needed for Wheezing.   • amLODIPine (NORVASC) 5 MG tablet Take 1 tablet by mouth Daily.   • amoxicillin-clavulanate (AUGMENTIN) 875-125 MG per tablet Take 1 tablet by mouth Every 12 (Twelve) Hours.   • aspirin (aspirin) 81 MG EC tablet Take 1 tablet by mouth Daily.   • atorvastatin (LIPITOR) 10 MG tablet Take 1 tablet by mouth Daily.   • Calcium Carb-Cholecalciferol (Os-Agusto Calcium + D3) 500-200 MG-UNIT tablet Take 1 tablet by mouth 2 (Two) Times a Day.   • cetirizine (zyrTEC) 10 MG tablet Take 1 tablet by mouth Daily.   • cyclobenzaprine (FLEXERIL) 10 MG tablet Take 1 tablet by mouth 3 (Three) Times a Day As Needed for Muscle Spasms.   • famotidine (PEPCID) 40 MG tablet  "TAKE 1 TABLET BY MOUTH  DAILY FOR HIVES AND REFLUX   • FLUoxetine (PROzac) 20 MG capsule TAKE 1 CAPSULE BY MOUTH  DAILY   • furosemide (LASIX) 20 MG tablet TAKE 1/2 TO 1 TABLET BY  MOUTH DAILY IF NEEDED FOR  LEG EDEMA   • gabapentin (NEURONTIN) 400 MG capsule Take 1 capsule by mouth 3 (Three) Times a Day.   • lisinopril (PRINIVIL,ZESTRIL) 40 MG tablet TAKE 1 TABLET BY MOUTH  DAILY   • metoprolol succinate XL (Toprol XL) 100 MG 24 hr tablet Take 1 tablet by mouth Daily.   • predniSONE (DELTASONE) 50 MG tablet Take 1 tablet by mouth Daily.   • sildenafil (REVATIO) 20 MG tablet Take 1 tablet by mouth Daily. CAN TAKE UP  MG IF NEED   • [DISCONTINUED] azithromycin (ZITHROMAX) 250 MG tablet Take 2 tablets the first day, then 1 tablet daily for 4 days.     No current facility-administered medications on file prior to visit.       Objective   Vital Signs:   /80 (BP Location: Right arm, Patient Position: Sitting, Cuff Size: Adult)   Pulse 80   Temp 98.6 °F (37 °C) (Temporal)   Resp 18   Ht 180.3 cm (71\")   Wt 124 kg (272 lb 12.8 oz)   SpO2 92%   BMI 38.05 kg/m²       Physical Exam  Vitals and nursing note reviewed.   Constitutional:       General: He is not in acute distress.     Appearance: Normal appearance. He is well-developed. He is obese. He is not ill-appearing.   HENT:      Head: Normocephalic and atraumatic.      Nose: Nose normal.   Eyes:      Conjunctiva/sclera: Conjunctivae normal.      Pupils: Pupils are equal, round, and reactive to light.   Neck:      Thyroid: No thyromegaly.      Vascular: No JVD.   Cardiovascular:      Rate and Rhythm: Normal rate and regular rhythm.      Heart sounds: Normal heart sounds. No murmur heard.  Pulmonary:      Effort: No respiratory distress.      Comments: There is faint wheezing and rhonchi audible in the right lower lobe  Musculoskeletal:         General: Normal range of motion.      Cervical back: Normal range of motion.   Lymphadenopathy:      Cervical: " No cervical adenopathy.   Skin:     General: Skin is warm and dry.      Findings: No rash.   Neurological:      Mental Status: He is alert and oriented to person, place, and time.   Psychiatric:         Mood and Affect: Mood normal.         Behavior: Behavior normal.         Thought Content: Thought content normal.         Judgment: Judgment normal.            No visits with results within 1 Day(s) from this visit.   Latest known visit with results is:   Results Encounter on 01/10/2023   Component Date Value Ref Range Status   • Hemoglobin A1C 01/09/2023 6.4 (H)  4.8 - 5.6 % Final    Comment:          Prediabetes: 5.7 - 6.4           Diabetes: >6.4           Glycemic control for adults with diabetes: <7.0     • Glucose 01/09/2023 110 (H)  70 - 99 mg/dL Final   • BUN 01/09/2023 18  8 - 27 mg/dL Final   • Creatinine 01/09/2023 1.11  0.76 - 1.27 mg/dL Final   • EGFR Result 01/09/2023 73  >59 mL/min/1.73 Final   • BUN/Creatinine Ratio 01/09/2023 16  10 - 24 Final   • Sodium 01/09/2023 143  134 - 144 mmol/L Final   • Potassium 01/09/2023 4.5  3.5 - 5.2 mmol/L Final   • Chloride 01/09/2023 103  96 - 106 mmol/L Final   • Total CO2 01/09/2023 27  20 - 29 mmol/L Final   • Calcium 01/09/2023 10.3 (H)  8.6 - 10.2 mg/dL Final   • Total Protein 01/09/2023 7.1  6.0 - 8.5 g/dL Final   • Albumin 01/09/2023 4.3  3.8 - 4.8 g/dL Final   • Globulin 01/09/2023 2.8  1.5 - 4.5 g/dL Final   • A/G Ratio 01/09/2023 1.5  1.2 - 2.2 Final   • Total Bilirubin 01/09/2023 0.3  0.0 - 1.2 mg/dL Final   • Alkaline Phosphatase 01/09/2023 94  44 - 121 IU/L Final   • AST (SGOT) 01/09/2023 14  0 - 40 IU/L Final   • ALT (SGPT) 01/09/2023 20  0 - 44 IU/L Final   • Total Cholesterol 01/09/2023 164  100 - 199 mg/dL Final   • Triglycerides 01/09/2023 133  0 - 149 mg/dL Final   • HDL Cholesterol 01/09/2023 43  >39 mg/dL Final   • VLDL Cholesterol Agusto 01/09/2023 24  5 - 40 mg/dL Final   • LDL Chol Calc ([facility]) 01/09/2023 97  0 - 99 mg/dL Final   • Microalbumin,  Urine 01/09/2023 <3.0  Not Estab. ug/mL Final     A1C Last 3 Results        8/2/2022    11:30 1/9/2023    09:06   HGBA1C Last 3 Results   Hemoglobin A1C 6.3   6.4       Lab Results   Component Value Date    CHOL 176 02/11/2017    CHLPL 164 01/09/2023    TRIG 133 01/09/2023    HDL 43 01/09/2023    LDL 97 01/09/2023     Lab Results   Component Value Date    TSH 0.556 07/08/2021     Lab Results   Component Value Date    GLUCOSE 110 (H) 01/09/2023    BUN 18 01/09/2023    CREATININE 1.11 01/09/2023    EGFRIFNONA 74 02/21/2022    EGFRIFAFRI 86 02/21/2022    BCR 16 01/09/2023    K 4.5 01/09/2023    CO2 27 01/09/2023    CALCIUM 10.3 (H) 01/09/2023    PROTENTOTREF 7.1 01/09/2023    ALBUMIN 4.3 01/09/2023    LABIL2 1.5 01/09/2023    AST 14 01/09/2023    ALT 20 01/09/2023     Lab Results   Component Value Date    WBC 6.7 08/02/2022    HGB 14.7 08/02/2022    HCT 43.4 08/02/2022    MCV 90 08/02/2022     08/02/2022                 Chest x-ray [facility] April 17, 2023 impression suggestion of minimal bibasilar atelectasis and/or infiltrate, please clinically correlate.     Assessment and Plan    Diagnoses and all orders for this visit:    1. Follow-up exam after treatment (Primary)    2. Pneumonia due to infectious organism, unspecified laterality, unspecified part of lung  -     doxycycline (MONODOX) 100 MG capsule; Take 1 capsule by mouth 2 (Two) Times a Day.  Dispense: 14 capsule; Refill: 0  -     fluticasone-salmeterol (Advair HFA) 230-21 MCG/ACT inhaler; Inhale 2 puffs 2 (Two) Times a Day.  Dispense: 12 g; Refill: 0    3. Class 2 severe obesity due to excess calories with serious comorbidity and body mass index (BMI) of 38.0 to 38.9 in adult  Assessment & Plan:  Patient's (Body mass index is 38.05 kg/m².) indicates that they are morbidly/severely obese (BMI > 40 or > 35 with obesity - related health condition) with health conditions that include hypertension and dyslipidemias . Weight is worsening. BMI  is  above average; BMI management plan is completed. We discussed portion control and increasing exercise.       4. Tobacco use    5. Cigar smoker motivated to quit    6. History of cigarette smoking    Pneumonia with persistent wheezing in right lower lobe.  He is completing Augmentin tomorrow he has previously had 2 courses of azithromycin in the past 6 weeks.  We are going to change to doxycycline and have him keep appointment for CT as scheduled on Monday.  We will have him follow-up in office in approximately 1 week for recheck on pneumonia.  Did encourage to stop cigar use altogether.  Additionally we are going to add combination inhaled steroid, long-acting bronchodilator.    Medications Discontinued During This Encounter   Medication Reason   • azithromycin (ZITHROMAX) 250 MG tablet *Therapy completed         Follow Up     Return in about 1 week (around 5/3/2023), or Or the 8th follow up pneumopnia and CT chest.    Patient was given instructions and counseling regarding his condition or for health maintenance advice. Please see specific information pulled into the AVS if appropriate.

## 2023-04-26 ENCOUNTER — TELEPHONE (OUTPATIENT)
Dept: FAMILY MEDICINE CLINIC | Facility: CLINIC | Age: 68
End: 2023-04-26

## 2023-04-26 ENCOUNTER — OFFICE VISIT (OUTPATIENT)
Dept: FAMILY MEDICINE CLINIC | Facility: CLINIC | Age: 68
End: 2023-04-26
Payer: MEDICARE

## 2023-04-26 VITALS
DIASTOLIC BLOOD PRESSURE: 80 MMHG | WEIGHT: 272.8 LBS | TEMPERATURE: 98.6 F | HEART RATE: 80 BPM | HEIGHT: 71 IN | OXYGEN SATURATION: 92 % | SYSTOLIC BLOOD PRESSURE: 134 MMHG | RESPIRATION RATE: 18 BRPM | BODY MASS INDEX: 38.19 KG/M2

## 2023-04-26 DIAGNOSIS — Z09 FOLLOW-UP EXAM AFTER TREATMENT: Primary | ICD-10-CM

## 2023-04-26 DIAGNOSIS — Z72.0 TOBACCO USE: ICD-10-CM

## 2023-04-26 DIAGNOSIS — Z87.891 HISTORY OF CIGARETTE SMOKING: ICD-10-CM

## 2023-04-26 DIAGNOSIS — F17.290 CIGAR SMOKER MOTIVATED TO QUIT: ICD-10-CM

## 2023-04-26 DIAGNOSIS — E66.01 CLASS 2 SEVERE OBESITY DUE TO EXCESS CALORIES WITH SERIOUS COMORBIDITY AND BODY MASS INDEX (BMI) OF 38.0 TO 38.9 IN ADULT: ICD-10-CM

## 2023-04-26 DIAGNOSIS — J18.9 PNEUMONIA DUE TO INFECTIOUS ORGANISM, UNSPECIFIED LATERALITY, UNSPECIFIED PART OF LUNG: ICD-10-CM

## 2023-04-26 RX ORDER — AMOXICILLIN AND CLAVULANATE POTASSIUM 875; 125 MG/1; MG/1
1 TABLET, FILM COATED ORAL EVERY 12 HOURS SCHEDULED
COMMUNITY
Start: 2023-04-18 | End: 2023-05-01

## 2023-04-26 RX ORDER — PREDNISONE 50 MG/1
1 TABLET ORAL DAILY
COMMUNITY
Start: 2023-04-17 | End: 2023-05-01

## 2023-04-26 RX ORDER — FLUTICASONE PROPIONATE AND SALMETEROL XINAFOATE 230; 21 UG/1; UG/1
2 AEROSOL, METERED RESPIRATORY (INHALATION)
Qty: 12 G | Refills: 0 | Status: SHIPPED | OUTPATIENT
Start: 2023-04-26 | End: 2023-04-28 | Stop reason: CLARIF

## 2023-04-26 RX ORDER — DOXYCYCLINE 100 MG/1
100 CAPSULE ORAL 2 TIMES DAILY
Qty: 14 CAPSULE | Refills: 0 | Status: SHIPPED | OUTPATIENT
Start: 2023-04-26

## 2023-04-26 NOTE — ASSESSMENT & PLAN NOTE
Patient's (Body mass index is 38.05 kg/m².) indicates that they are morbidly/severely obese (BMI > 40 or > 35 with obesity - related health condition) with health conditions that include hypertension and dyslipidemias . Weight is worsening. BMI  is above average; BMI management plan is completed. We discussed portion control and increasing exercise.

## 2023-04-28 ENCOUNTER — TELEPHONE (OUTPATIENT)
Dept: FAMILY MEDICINE CLINIC | Facility: CLINIC | Age: 68
End: 2023-04-28
Payer: MEDICARE

## 2023-04-28 DIAGNOSIS — B37.0 THRUSH: Primary | ICD-10-CM

## 2023-04-28 RX ORDER — MOMETASONE FUROATE AND FORMOTEROL FUMARATE DIHYDRATE 200; 5 UG/1; UG/1
2 AEROSOL RESPIRATORY (INHALATION)
Qty: 13 G | Refills: 0 | Status: SHIPPED | OUTPATIENT
Start: 2023-04-28

## 2023-04-28 NOTE — TELEPHONE ENCOUNTER
Spoke to pt 04/28/2023, 5:45 pm pt states he has had the rash for 2 weeks getting worse.  Appt made with BRITNI Sue 05/01/2023

## 2023-05-01 ENCOUNTER — HOSPITAL ENCOUNTER (OUTPATIENT)
Dept: ULTRASOUND IMAGING | Facility: HOSPITAL | Age: 68
Discharge: HOME | End: 2023-05-01
Payer: MEDICARE

## 2023-05-01 ENCOUNTER — OFFICE VISIT (OUTPATIENT)
Dept: FAMILY MEDICINE CLINIC | Facility: CLINIC | Age: 68
End: 2023-05-01
Payer: MEDICARE

## 2023-05-01 ENCOUNTER — HOSPITAL ENCOUNTER (OUTPATIENT)
Dept: CT IMAGING | Facility: HOSPITAL | Age: 68
Discharge: HOME | End: 2023-05-01
Payer: MEDICARE

## 2023-05-01 VITALS
SYSTOLIC BLOOD PRESSURE: 146 MMHG | TEMPERATURE: 97.7 F | HEART RATE: 78 BPM | OXYGEN SATURATION: 94 % | HEIGHT: 71 IN | BODY MASS INDEX: 38.5 KG/M2 | DIASTOLIC BLOOD PRESSURE: 84 MMHG | WEIGHT: 275 LBS | RESPIRATION RATE: 16 BRPM

## 2023-05-01 DIAGNOSIS — F17.290 CIGAR SMOKER MOTIVATED TO QUIT: ICD-10-CM

## 2023-05-01 DIAGNOSIS — Z87.891 HISTORY OF CIGARETTE SMOKING: ICD-10-CM

## 2023-05-01 DIAGNOSIS — J02.9 SORE THROAT: ICD-10-CM

## 2023-05-01 DIAGNOSIS — B37.0 ORAL THRUSH: Primary | ICD-10-CM

## 2023-05-01 DIAGNOSIS — Z72.0 TOBACCO USE: ICD-10-CM

## 2023-05-01 DIAGNOSIS — R06.2 WHEEZING: ICD-10-CM

## 2023-05-01 DIAGNOSIS — Z12.2 SCREENING FOR LUNG CANCER: ICD-10-CM

## 2023-05-01 LAB
EXPIRATION DATE: NORMAL
INTERNAL CONTROL: NORMAL
Lab: NORMAL
S PYO AG THROAT QL: NEGATIVE

## 2023-05-01 PROCEDURE — 3079F DIAST BP 80-89 MM HG: CPT | Performed by: NURSE PRACTITIONER

## 2023-05-01 PROCEDURE — 71271 CT THORAX LUNG CANCER SCR C-: CPT

## 2023-05-01 PROCEDURE — 3077F SYST BP >= 140 MM HG: CPT | Performed by: NURSE PRACTITIONER

## 2023-05-01 PROCEDURE — 1159F MED LIST DOCD IN RCRD: CPT | Performed by: NURSE PRACTITIONER

## 2023-05-01 PROCEDURE — 76706 US ABDL AORTA SCREEN AAA: CPT

## 2023-05-01 PROCEDURE — 1160F RVW MEDS BY RX/DR IN RCRD: CPT | Performed by: NURSE PRACTITIONER

## 2023-05-01 PROCEDURE — 99213 OFFICE O/P EST LOW 20 MIN: CPT | Performed by: NURSE PRACTITIONER
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
## 2023-05-08 ENCOUNTER — OFFICE VISIT (OUTPATIENT)
Dept: FAMILY MEDICINE CLINIC | Facility: CLINIC | Age: 68
End: 2023-05-08
Payer: MEDICARE

## 2023-05-08 VITALS
WEIGHT: 274.38 LBS | RESPIRATION RATE: 18 BRPM | OXYGEN SATURATION: 96 % | SYSTOLIC BLOOD PRESSURE: 124 MMHG | BODY MASS INDEX: 38.41 KG/M2 | HEART RATE: 88 BPM | TEMPERATURE: 98.4 F | DIASTOLIC BLOOD PRESSURE: 72 MMHG | HEIGHT: 71 IN

## 2023-05-08 DIAGNOSIS — B37.0 ORAL THRUSH: ICD-10-CM

## 2023-05-08 DIAGNOSIS — M54.42 MIDLINE LOW BACK PAIN WITH LEFT-SIDED SCIATICA, UNSPECIFIED CHRONICITY: ICD-10-CM

## 2023-05-08 DIAGNOSIS — E66.01 CLASS 2 SEVERE OBESITY DUE TO EXCESS CALORIES WITH SERIOUS COMORBIDITY AND BODY MASS INDEX (BMI) OF 38.0 TO 38.9 IN ADULT: ICD-10-CM

## 2023-05-08 DIAGNOSIS — J69.0 ASPIRATION PNEUMONIA OF RIGHT LOWER LOBE, UNSPECIFIED ASPIRATION PNEUMONIA TYPE: Primary | ICD-10-CM

## 2023-05-08 DIAGNOSIS — Z72.0 TOBACCO USE: ICD-10-CM

## 2023-05-08 DIAGNOSIS — S22.31XA CLOSED FRACTURE OF ONE RIB OF RIGHT SIDE, INITIAL ENCOUNTER: ICD-10-CM

## 2023-05-08 DIAGNOSIS — T17.800S ASPIRATION INTO LOWER RESPIRATORY TRACT, SEQUELA: ICD-10-CM

## 2023-05-08 DIAGNOSIS — J43.9 PULMONARY EMPHYSEMA, UNSPECIFIED EMPHYSEMA TYPE: ICD-10-CM

## 2023-05-08 DIAGNOSIS — M25.552 LEFT HIP PAIN: ICD-10-CM

## 2023-05-08 RX ORDER — AMOXICILLIN AND CLAVULANATE POTASSIUM 875; 125 MG/1; MG/1
1 TABLET, FILM COATED ORAL 2 TIMES DAILY
Qty: 20 TABLET | Refills: 0 | Status: SHIPPED | OUTPATIENT
Start: 2023-05-08 | End: 2023-05-09 | Stop reason: SDUPTHER

## 2023-05-08 RX ORDER — CYCLOBENZAPRINE HCL 10 MG
10 TABLET ORAL 3 TIMES DAILY PRN
Qty: 270 TABLET | Refills: 2 | Status: SHIPPED | OUTPATIENT
Start: 2023-05-08

## 2023-05-08 NOTE — ASSESSMENT & PLAN NOTE
Patient's (Body mass index is 38.27 kg/m².) indicates that they are morbidly/severely obese (BMI > 40 or > 35 with obesity - related health condition) with health conditions that include hypertension and dyslipidemias . Weight is unchanged. BMI  is above average; BMI management plan is completed. We discussed portion control and increasing exercise.

## 2023-05-09 DIAGNOSIS — J69.0 ASPIRATION PNEUMONIA OF RIGHT LOWER LOBE, UNSPECIFIED ASPIRATION PNEUMONIA TYPE: ICD-10-CM

## 2023-05-09 RX ORDER — AMOXICILLIN AND CLAVULANATE POTASSIUM 875; 125 MG/1; MG/1
1 TABLET, FILM COATED ORAL 2 TIMES DAILY
Qty: 20 TABLET | Refills: 0 | Status: SHIPPED | OUTPATIENT
Start: 2023-05-09

## 2023-05-09 NOTE — TELEPHONE ENCOUNTER
Caller: James Cleveland    Relationship: Self    Best call back number: [phone]    Requested Prescriptions:   Requested Prescriptions     Pending Prescriptions Disp Refills   • amoxicillin-clavulanate (AUGMENTIN) 875-125 MG per tablet 20 tablet 0     Sig: Take 1 tablet by mouth 2 (Two) Times a Day.        Pharmacy where request should be sent: [address] [phone] [facility] [phone]      Last office visit with prescribing clinician: 5/8/2023   Last telemedicine visit with prescribing clinician: 5/8/2023   Next office visit with prescribing clinician: 7/10/2023     Additional details provided by patient: PATIENT IS COMPLETELY OUT. PATIENT REQUESTING TO HAVE THE REFILL SENT TO [facility], BUT IT WAS SENT TO THE MAIL DELIVERY SERVICE INSTEAD. PATIENT HAS STILL NOT RECEIVED ANY MEDICATION     Does the patient have less than a 3 day supply:  [x] Yes  [] No    Would you like a call back once the refill request has been completed: [x] Yes [] No    If the office needs to give you a call back, can they leave a voicemail: [x] Yes [] No    Luke Espinoza Rep   05/09/23 10:28 EDT

## 2023-06-20 ENCOUNTER — TELEPHONE (OUTPATIENT)
Dept: FAMILY MEDICINE CLINIC | Facility: CLINIC | Age: 68
End: 2023-06-20

## 2023-06-20 NOTE — TELEPHONE ENCOUNTER
Caller: James Cleveland    Relationship: Self    Best call back number: [phone]    Requested Prescriptions:     BREATHING TREATMENTS AND MACHINE     Pharmacy where request should be sent: [facility] - [address] - [phone] [facility] [phone] FX     Last office visit with prescribing clinician: 5/8/2023   Last telemedicine visit with prescribing clinician: Visit date not found   Next office visit with prescribing clinician: 7/10/2023     Additional details provided by patient: PATIENT IS STILL WHEEZING AND NEED MEDICATION TO BE SENT TO THE PHARMACY    Does the patient have less than a 3 day supply:  [] Yes  [] No    Would you like a call back once the refill request has been completed: [] Yes [] No    If the office needs to give you a call back, can they leave a voicemail: [] Yes [] No    Luke Samuels Rep   06/20/23 16:13 EDT

## 2023-06-22 PROBLEM — J30.2 SEASONAL ALLERGIES: Status: ACTIVE | Noted: 2023-06-22

## 2023-06-22 PROBLEM — R22.2 SUPRACLAVICULAR MASS: Status: ACTIVE | Noted: 2023-06-22

## 2023-06-22 PROBLEM — J44.1 CHRONIC OBSTRUCTIVE PULMONARY DISEASE WITH ACUTE EXACERBATION: Status: ACTIVE | Noted: 2023-06-22

## 2023-06-22 PROBLEM — Z87.898 HISTORY OF AIRWAY ASPIRATION: Status: ACTIVE | Noted: 2023-06-22

## 2023-06-27 ENCOUNTER — TELEPHONE (OUTPATIENT)
Dept: FAMILY MEDICINE CLINIC | Facility: CLINIC | Age: 68
End: 2023-06-27

## 2023-06-27 NOTE — TELEPHONE ENCOUNTER
Caller: LICHA    Relationship: Other    Best call back number: [phone]       LICHA CALLED, WITH [facility] IN REGARDS TO A FAX THEY SENT OVER ON THIS PATIENT, 06/23/23.    REFERENCE NUMBER [id number]      PATIENT IS TRYING TO GET SOME GENETIC TESTING DONE.    PLEASE ADVISE

## 2023-07-07 ENCOUNTER — TELEPHONE (OUTPATIENT)
Dept: FAMILY MEDICINE CLINIC | Facility: CLINIC | Age: 68
End: 2023-07-07

## 2023-07-07 DIAGNOSIS — I10 ESSENTIAL HYPERTENSION: ICD-10-CM

## 2023-07-07 RX ORDER — AMLODIPINE BESYLATE 5 MG/1
5 TABLET ORAL DAILY
Qty: 12 TABLET | Refills: 0 | Status: SHIPPED | OUTPATIENT
Start: 2023-07-07 | End: 2023-07-26 | Stop reason: SDUPTHER

## 2023-07-07 NOTE — TELEPHONE ENCOUNTER
Caller: James Cleveland    Relationship: Self    Best call back number:[phone]    Requested Prescriptions:          amLODIPine (NORVASC) 5 MG tablet  5 mg, Daily     Pharmacy where request should be sent:    [facility]  [address] [place] [address] · ~[address]  [phone]    Last office visit with prescribing clinician: 5/8/2023   Last telemedicine visit with prescribing clinician: Visit date not found   Next office visit with prescribing clinician: Visit date not found     Additional details provided by patient: PATIENT IS ASKING FOR A SUPPLY OF A DOZEN UNTIL HIS INSURANCE KICK IN    Does the patient have less than a 3 day supply:  [] Yes  [] No    Would you like a call back once the refill request has been completed: [] Yes [] No    If the office needs to give you a call back, can they leave a voicemail: [] Yes [] No    Luke Samuels Rep   07/07/23 12:15 EDT

## 2023-07-13 ENCOUNTER — TELEPHONE (OUTPATIENT)
Dept: FAMILY MEDICINE CLINIC | Facility: CLINIC | Age: 68
End: 2023-07-13

## 2023-07-13 NOTE — TELEPHONE ENCOUNTER
Caller: James Cleveland    Relationship: Self    Best call back number: [phone]    What is the best time to reach you: ANYTIME    Who are you requesting to speak with (clinical staff, provider,  specific staff member): CLINICAL STAFF    Do you know the name of the person who called: PATIENT     What was the call regarding: PATIENT WANTED TO SPEAK TO DR. PORTILLO'S MEDICAL ASSISTANT ABOUT HIS CURRENT HOSPITALIZATION SO HE CAN BE SURE DR. PORTILLO IS AWARE     Is it okay if the provider responds through MyChart: NO

## 2023-07-25 NOTE — PROGRESS NOTES
Chief Complaint  Hospital Follow Up Visit    History of Present Illness  James Cleveland presents today for hospital follow up.     James was seen at  [facility] .   He was admitted on 07/12/2023  for shortness of breath.   He was discharged on 07/13/2023.  Records have been obtained and reviewed.  Discharge diagnosis was COPD exacerbation with hypoxia requiring oxygen at time of discharge  Currently James receives care at home.   The patient stated that they do not need help with their daily life and activities.   The patient stated that they do have emotional support at home.  Did get home oxygen 2 L use at night and occasionally during the day.    Saw Dr Salazar 6 days ago. (Follow up 8/22) Told to continue same meds, duonebs qid and trelegy and albuterol inhalers. Using albuterol 4 times daily Started on Daliresp 2 days ago. Scheduling PFT's [facility] on 8/22/23. Will also do a 6 minute walk, stress test and sleep study.  Smoking 5-6 small cigars a week, do want to quit.  Calling [facility] nurse to request nicotine patches  Chantix in past caused severe night hopkins  September 8th or Sept 24th parents birthdays  Overall feeling some better.       Current Outpatient Medications on File Prior to Visit   Medication Sig    albuterol sulfate  (90 Base) MCG/ACT inhaler Inhale 2 puffs Every 4 (Four) Hours As Needed for Wheezing.    aspirin (aspirin) 81 MG EC tablet Take 1 tablet by mouth Daily.    Calcium Carb-Cholecalciferol (Os-Agusto Calcium + D3) 500-200 MG-UNIT tablet Take 1 tablet by mouth 2 (Two) Times a Day.    cetirizine (zyrTEC) 10 MG tablet Take 1 tablet by mouth Daily.    famotidine (PEPCID) 40 MG tablet TAKE 1 TABLET BY MOUTH  DAILY FOR HIVES AND REFLUX    Fluticasone-Umeclidin-Vilant (TRELEGY) 100-62.5-25 MCG/ACT inhaler Inhale 1 puff Daily.    furosemide (LASIX) 20 MG tablet TAKE 1/2 TO 1 TABLET BY  MOUTH DAILY IF NEEDED FOR  LEG EDEMA    roflumilast (DALIRESP) 500 MCG tablet tablet  "Take 1 tablet by mouth Daily.    sildenafil (REVATIO) 20 MG tablet Take 1 tablet by mouth Daily. CAN TAKE UP  MG IF NEED     No current facility-administered medications on file prior to visit.       Objective   Vital Signs:   /72 (BP Location: Right arm, Patient Position: Sitting, Cuff Size: Adult)   Pulse 110   Temp 97.8 øF (36.6 øC) (Temporal)   Resp 16   Ht 180.3 cm (71\")   Wt 125 kg (275 lb 4 oz)   SpO2 94% Comment: room air  BMI 38.39 kg/mý       Physical Exam  Vitals and nursing note reviewed.   Constitutional:       General: He is not in acute distress.     Appearance: Normal appearance. He is well-developed. He is obese. He is not ill-appearing.   HENT:      Head: Normocephalic and atraumatic.      Nose: Nose normal.   Eyes:      Conjunctiva/sclera: Conjunctivae normal.      Pupils: Pupils are equal, round, and reactive to light.   Neck:      Thyroid: No thyromegaly.      Vascular: No JVD.   Cardiovascular:      Rate and Rhythm: Normal rate and regular rhythm.      Heart sounds: Normal heart sounds. No murmur heard.  Pulmonary:      Effort: No respiratory distress.      Breath sounds: Wheezing and rhonchi present.      Comments: There is faint wheezing and rhonchi in bases  Musculoskeletal:         General: Normal range of motion.      Cervical back: Normal range of motion.   Lymphadenopathy:      Cervical: No cervical adenopathy.   Skin:     General: Skin is warm and dry.      Findings: No rash.   Neurological:      Mental Status: He is alert and oriented to person, place, and time.   Psychiatric:         Mood and Affect: Mood normal.         Behavior: Behavior normal.         Thought Content: Thought content normal.         Judgment: Judgment normal.          No visits with results within 1 Day(s) from this visit.   Latest known visit with results is:   Office Visit on 06/22/2023   Component Date Value Ref Range Status    Hemoglobin A1C 06/22/2023 6.1  % Final    Lot Number 06/22/2023 " NA   Final    Expiration Date 06/22/2023 NA   Final     A1C Last 3 Results          1/9/2023    09:06 6/22/2023    17:19   HGBA1C Last 3 Results   Hemoglobin A1C 6.4  6.1      Lab Results   Component Value Date    CHOL 176 02/11/2017    CHLPL 164 01/09/2023    TRIG 133 01/09/2023    HDL 43 01/09/2023    LDL 97 01/09/2023     Lab Results   Component Value Date    TSH 0.556 07/08/2021     Lab Results   Component Value Date    GLUCOSE 110 (H) 01/09/2023    BUN 18 01/09/2023    CREATININE 1.11 01/09/2023    EGFRIFNONA 74 02/21/2022    EGFRIFAFRI 86 02/21/2022    BCR 16 01/09/2023    K 4.5 01/09/2023    CO2 27 01/09/2023    CALCIUM 10.3 (H) 01/09/2023    PROTENTOTREF 7.1 01/09/2023    ALBUMIN 4.3 01/09/2023    LABIL2 1.5 01/09/2023    AST 14 01/09/2023    ALT 20 01/09/2023     Lab Results   Component Value Date    WBC 6.7 08/02/2022    HGB 14.7 08/02/2022    HCT 43.4 08/02/2022    MCV 90 08/02/2022     08/02/2022                 No radiology results for the last 90 days.    CT of chest for screening for lung cancer shows:  1. Mucous/debris within the bronchus intermedius with plugging of multiple right lower lobe bronchi, correlate for findings of aspiration. New peripheral patchy airspace disease in right lower lobe may relate to superimposed infection or inflammation.  2. Stable small pulmonary nodules as above.  3. Advanced emphysema.  4. Coronary artery calcifications.  5. Subacute fracture at the right lateral seventh rib.  If cough and shortness of breath have not improved, may need to refer to pulmonology.     Assessment and Plan    Diagnoses and all orders for this visit:    1. Chronic obstructive pulmonary disease with acute exacerbation (Primary)    2. History of airway aspiration    3. Brachial plexus neuropathy  -     Discontinue: gabapentin (NEURONTIN) 400 MG capsule; Take 1 capsule by mouth 3 (Three) Times a Day.  Dispense: 90 capsule; Refill: 0    4. Closed displaced fracture of shaft of left  clavicle, sequela  -     Discontinue: gabapentin (NEURONTIN) 400 MG capsule; Take 1 capsule by mouth 3 (Three) Times a Day.  Dispense: 90 capsule; Refill: 0    5. Essential hypertension  -     Discontinue: amLODIPine (NORVASC) 5 MG tablet; Take 1 tablet by mouth Daily.  Dispense: 30 tablet; Refill: 0  -     Discontinue: metoprolol succinate XL (Toprol XL) 50 MG 24 hr tablet; Take 1 tablet by mouth Daily.  Dispense: 30 tablet; Refill: 0    6. Hyperlipemia, mixed  -     Discontinue: atorvastatin (LIPITOR) 10 MG tablet; Take 1 tablet by mouth Daily.  Dispense: 30 tablet; Refill: 0    7. Class 2 severe obesity due to excess calories with serious comorbidity and body mass index (BMI) of 38.0 to 38.9 in adult  Assessment & Plan:  Patient's (Body mass index is 38.39 kg/mý.) indicates that they are morbidly/severely obese (BMI > 40 or > 35 with obesity - related health condition) with health conditions that include hypertension and dyslipidemias . Weight is unchanged. BMI  is above average; BMI management plan is completed. We discussed portion control and increasing exercise.       8. Tobacco use      COPD with recent admission to [facility] for acute exacerbation with hypoxia.  Patient has now been set up for home oxygen.  He states he is feeling better and breathing better.  He has completed prednisone.  He is not currently on antibiotics.  He has had follow-up with pulmonology who added Daliresp to his pulmonary medications.  He is scheduled for follow-up with Dr Salazar on 8/22/23 after additional including PFT's, 6 minute walk, stress test and sleep study.  Advised to stop dulera, as this is duplicate therapy, continue Trelegy    Encouraged to quit tobacco use, currently in the form of 1 cigar nearly every day of the week.  He is going to obtain nicotine patches from the local health department.  Chantix in the past caused nightmares.    Renewing Lipitor, amlodipine, Toprol XL, and  gabapentin.    Medications Discontinued During This Encounter   Medication Reason    predniSONE (DELTASONE) 20 MG tablet *Therapy completed    atorvastatin (LIPITOR) 10 MG tablet Reorder    metoprolol succinate XL (Toprol XL) 100 MG 24 hr tablet Reorder    gabapentin (NEURONTIN) 400 MG capsule Reorder    amLODIPine (NORVASC) 5 MG tablet Reorder         Follow Up     Return in about 2 months (around 10/2/2023) for Recheck, htn, copd and flu vaccination if not already obtained.    Patient was given instructions and counseling regarding his condition or for health maintenance advice. Please see specific information pulled into the AVS if appropriate.

## 2023-07-26 ENCOUNTER — OFFICE VISIT (OUTPATIENT)
Dept: FAMILY MEDICINE CLINIC | Facility: CLINIC | Age: 68
End: 2023-07-26
Payer: MEDICARE

## 2023-07-26 VITALS
SYSTOLIC BLOOD PRESSURE: 138 MMHG | TEMPERATURE: 97.8 F | BODY MASS INDEX: 38.53 KG/M2 | RESPIRATION RATE: 16 BRPM | HEART RATE: 110 BPM | OXYGEN SATURATION: 94 % | DIASTOLIC BLOOD PRESSURE: 72 MMHG | WEIGHT: 275.25 LBS | HEIGHT: 71 IN

## 2023-07-26 DIAGNOSIS — J44.1 CHRONIC OBSTRUCTIVE PULMONARY DISEASE WITH ACUTE EXACERBATION: Primary | ICD-10-CM

## 2023-07-26 DIAGNOSIS — E78.2 HYPERLIPEMIA, MIXED: ICD-10-CM

## 2023-07-26 DIAGNOSIS — E66.01 CLASS 2 SEVERE OBESITY DUE TO EXCESS CALORIES WITH SERIOUS COMORBIDITY AND BODY MASS INDEX (BMI) OF 38.0 TO 38.9 IN ADULT: ICD-10-CM

## 2023-07-26 DIAGNOSIS — G54.0 BRACHIAL PLEXUS NEUROPATHY: ICD-10-CM

## 2023-07-26 DIAGNOSIS — I10 ESSENTIAL HYPERTENSION: ICD-10-CM

## 2023-07-26 DIAGNOSIS — S42.022S CLOSED DISPLACED FRACTURE OF SHAFT OF LEFT CLAVICLE, SEQUELA: ICD-10-CM

## 2023-07-26 DIAGNOSIS — Z72.0 TOBACCO USE: ICD-10-CM

## 2023-07-26 DIAGNOSIS — Z87.898 HISTORY OF AIRWAY ASPIRATION: ICD-10-CM

## 2023-07-26 RX ORDER — ROFLUMILAST 500 UG/1
1 TABLET ORAL DAILY
COMMUNITY
Start: 2023-07-24

## 2023-07-26 RX ORDER — GABAPENTIN 400 MG/1
400 CAPSULE ORAL 3 TIMES DAILY
Qty: 90 CAPSULE | Refills: 0 | Status: SHIPPED | OUTPATIENT
Start: 2023-07-26 | End: 2023-08-11 | Stop reason: SDUPTHER

## 2023-07-26 RX ORDER — ATORVASTATIN CALCIUM 10 MG/1
10 TABLET, FILM COATED ORAL DAILY
Qty: 30 TABLET | Refills: 0 | Status: SHIPPED | OUTPATIENT
Start: 2023-07-26 | End: 2023-08-11 | Stop reason: SDUPTHER

## 2023-07-26 RX ORDER — AMLODIPINE BESYLATE 5 MG/1
5 TABLET ORAL DAILY
Qty: 30 TABLET | Refills: 0 | Status: SHIPPED | OUTPATIENT
Start: 2023-07-26 | End: 2023-08-11 | Stop reason: SDUPTHER

## 2023-07-26 RX ORDER — METOPROLOL SUCCINATE 50 MG/1
50 TABLET, EXTENDED RELEASE ORAL DAILY
Qty: 30 TABLET | Refills: 0 | Status: SHIPPED | OUTPATIENT
Start: 2023-07-26 | End: 2023-08-11 | Stop reason: SDUPTHER

## 2023-07-26 NOTE — ASSESSMENT & PLAN NOTE
Patient's (Body mass index is 38.39 kg/mý.) indicates that they are morbidly/severely obese (BMI > 40 or > 35 with obesity - related health condition) with health conditions that include hypertension and dyslipidemias . Weight is unchanged. BMI  is above average; BMI management plan is completed. We discussed portion control and increasing exercise.

## 2023-08-07 ENCOUNTER — TELEPHONE (OUTPATIENT)
Dept: FAMILY MEDICINE CLINIC | Facility: CLINIC | Age: 68
End: 2023-08-07
Payer: MEDICARE

## 2023-08-10 ENCOUNTER — TELEPHONE (OUTPATIENT)
Dept: FAMILY MEDICINE CLINIC | Facility: CLINIC | Age: 68
End: 2023-08-10

## 2023-08-10 DIAGNOSIS — J44.1 CHRONIC OBSTRUCTIVE PULMONARY DISEASE WITH ACUTE EXACERBATION: ICD-10-CM

## 2023-08-10 RX ORDER — IPRATROPIUM BROMIDE AND ALBUTEROL SULFATE 2.5; .5 MG/3ML; MG/3ML
3 SOLUTION RESPIRATORY (INHALATION) 4 TIMES DAILY PRN
Qty: 360 ML | Refills: 12 | Status: SHIPPED | OUTPATIENT
Start: 2023-08-10

## 2023-08-10 NOTE — TELEPHONE ENCOUNTER
Per my recent note he states when he saw Dr Salazar he was told to continue same meds which included duonebs (nebulized albuterol and ipratropium bromide).  Appears this refill request went to [facility], I have renewed sending to [place] [facility] instead of mail order(as the default was set).

## 2023-08-10 NOTE — TELEPHONE ENCOUNTER
Caller: James Cleveland    Relationship: Self    Best call back number: [phone]     What is the best time to reach you: ANY TIME    Who are you requesting to speak with (clinical staff, provider,  specific staff member): CLINICAL STAFF    What was the call regarding: PATIENT STATES HE SAW GLEN IN JUNE AND SHE PRESCRIBED A NEBULIZER AND SOLUTION FOR PATIENT TO USE AND HE WANTS TO KNOW IF HE SHOULD CONTINUE WITH THIS TREATMENT. THERE WERE NO REFILLS ON THE MEDICATION SO HE WANTED TO CHECK. IF HE IS TO STAY ON MEDICATION HE WOULD LIKE REFILLS SENT TO [facility] - [place], [place] - [address] [place] [address] B - [phone]  - [phone] FX     PLEASE ADVISE    Is it okay if the provider responds through MyChart: NO, PHONE CALL

## 2023-08-11 DIAGNOSIS — M54.42 MIDLINE LOW BACK PAIN WITH LEFT-SIDED SCIATICA, UNSPECIFIED CHRONICITY: ICD-10-CM

## 2023-08-11 DIAGNOSIS — M25.552 LEFT HIP PAIN: ICD-10-CM

## 2023-08-11 DIAGNOSIS — F41.9 ANXIETY: ICD-10-CM

## 2023-08-11 DIAGNOSIS — G54.0 BRACHIAL PLEXUS NEUROPATHY: ICD-10-CM

## 2023-08-11 DIAGNOSIS — I10 ESSENTIAL HYPERTENSION: ICD-10-CM

## 2023-08-11 DIAGNOSIS — E78.2 HYPERLIPEMIA, MIXED: ICD-10-CM

## 2023-08-11 DIAGNOSIS — S42.022S CLOSED DISPLACED FRACTURE OF SHAFT OF LEFT CLAVICLE, SEQUELA: ICD-10-CM

## 2023-08-11 RX ORDER — IPRATROPIUM BROMIDE AND ALBUTEROL SULFATE 2.5; .5 MG/3ML; MG/3ML
SOLUTION RESPIRATORY (INHALATION)
Qty: 360 ML | Refills: 0 | OUTPATIENT
Start: 2023-08-11

## 2023-08-14 RX ORDER — CYCLOBENZAPRINE HCL 10 MG
10 TABLET ORAL 3 TIMES DAILY PRN
Qty: 270 TABLET | Refills: 2 | Status: SHIPPED | OUTPATIENT
Start: 2023-08-14

## 2023-08-14 RX ORDER — LISINOPRIL 40 MG/1
40 TABLET ORAL DAILY
Qty: 90 TABLET | Refills: 3 | Status: SHIPPED | OUTPATIENT
Start: 2023-08-14

## 2023-08-14 RX ORDER — ATORVASTATIN CALCIUM 10 MG/1
10 TABLET, FILM COATED ORAL DAILY
Qty: 30 TABLET | Refills: 0 | Status: SHIPPED | OUTPATIENT
Start: 2023-08-14

## 2023-08-14 RX ORDER — GABAPENTIN 400 MG/1
400 CAPSULE ORAL 3 TIMES DAILY
Qty: 90 CAPSULE | Refills: 0 | Status: SHIPPED | OUTPATIENT
Start: 2023-08-14

## 2023-08-14 RX ORDER — AMLODIPINE BESYLATE 5 MG/1
5 TABLET ORAL DAILY
Qty: 30 TABLET | Refills: 0 | Status: SHIPPED | OUTPATIENT
Start: 2023-08-14

## 2023-08-14 RX ORDER — METOPROLOL SUCCINATE 50 MG/1
50 TABLET, EXTENDED RELEASE ORAL DAILY
Qty: 30 TABLET | Refills: 0 | Status: SHIPPED | OUTPATIENT
Start: 2023-08-14

## 2023-08-14 RX ORDER — FLUOXETINE HYDROCHLORIDE 20 MG/1
20 CAPSULE ORAL DAILY
Qty: 90 CAPSULE | Refills: 3 | Status: SHIPPED | OUTPATIENT
Start: 2023-08-14

## 2023-08-16 ENCOUNTER — OFFICE VISIT (OUTPATIENT)
Dept: CARDIOLOGY | Facility: CLINIC | Age: 68
End: 2023-08-16
Payer: MEDICARE

## 2023-08-16 ENCOUNTER — OUTSIDE FACILITY SERVICE (OUTPATIENT)
Dept: CARDIOLOGY | Facility: CLINIC | Age: 68
End: 2023-08-16
Payer: MEDICARE

## 2023-08-16 VITALS
HEART RATE: 90 BPM | HEIGHT: 71 IN | OXYGEN SATURATION: 92 % | DIASTOLIC BLOOD PRESSURE: 98 MMHG | SYSTOLIC BLOOD PRESSURE: 151 MMHG | BODY MASS INDEX: 38.11 KG/M2 | WEIGHT: 272.2 LBS

## 2023-08-16 DIAGNOSIS — I25.10 CORONARY ARTERY CALCIFICATION: ICD-10-CM

## 2023-08-16 DIAGNOSIS — I10 ESSENTIAL HYPERTENSION: Primary | ICD-10-CM

## 2023-08-16 DIAGNOSIS — E78.2 HYPERLIPEMIA, MIXED: ICD-10-CM

## 2023-08-16 DIAGNOSIS — I25.84 CORONARY ARTERY CALCIFICATION: ICD-10-CM

## 2023-08-16 PROCEDURE — 93306 TTE W/DOPPLER COMPLETE: CPT | Performed by: INTERNAL MEDICINE
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
## 2023-08-31 DIAGNOSIS — J43.9 PULMONARY EMPHYSEMA, UNSPECIFIED EMPHYSEMA TYPE: ICD-10-CM

## 2023-08-31 DIAGNOSIS — J40 BRONCHITIS: ICD-10-CM

## 2023-08-31 RX ORDER — ALBUTEROL SULFATE 90 UG/1
AEROSOL, METERED RESPIRATORY (INHALATION)
Qty: 8.5 G | Refills: 2 | Status: SHIPPED | OUTPATIENT
Start: 2023-08-31

## 2023-09-03 DIAGNOSIS — I10 ESSENTIAL HYPERTENSION: ICD-10-CM

## 2023-09-05 RX ORDER — METOPROLOL SUCCINATE 50 MG/1
50 TABLET, EXTENDED RELEASE ORAL DAILY
Qty: 30 TABLET | Refills: 11 | Status: SHIPPED | OUTPATIENT
Start: 2023-09-05

## 2023-10-03 NOTE — PROGRESS NOTES
Chief Complaint  COPD and Hypertension    History of Present Illness  James Cleveland presents today for hypertension and COPD.    Hypertension  Patient does occasionally check blood pressure at home.   Home readings are 132/84 -142/80's. had an elevation Thursday 151/98 at Dr Salazar's office, has not had recently.  Patient reports shortness of breath and headaches.  Patient states medications is working well.    Taking lasix 20 mg 1/2 tablet daily on extreme rare occasion will take a whole tablet when have more swelling in right ankle.     COPD: Patient complains of dyspnea, cough, wheezing, fatigue, and colored sputum. Symptoms began a few months ago. Symptoms acute dyspnea, cough productive of brown sputum in large amounts, and wheezing does worsen with exertion. Patient uses 1 pillows at night. Patient can walk about half a mile before resting. Patient currently  2 liters of oxygen PRN . Respiratory history: asthma, occasional episodes of bronchitis, COPD, emphysema, and pneumonia   Patient typically only uses oxygen while in his home but not while sleeping.  He does have portable tanks and does anticipate using them if he is out of the house for extended periods such as when he is fishing.  He has been teaching himself proper breathing with deep inspiration and pursed lip breathing expiration which does improve his oxygen levels.    Influenza immunization was not given due to patient refusal.     Did see Dr Valentine 8/16/23 was told to follow up in 1 year    Would like to stop Prozac. Concerned may be causing wheezing.     Patient Care Team:  Ashwini Ndiaye MD as PCP - General (Family Medicine)   Current Outpatient Medications on File Prior to Visit   Medication Sig    albuterol sulfate  (90 Base) MCG/ACT inhaler INHALE TWO PUFFS EVERY 4 HOURS AS NEEDED FOR WHEEZING    amLODIPine (NORVASC) 5 MG tablet Take 1 tablet by mouth Daily.    aspirin (aspirin) 81 MG EC tablet Take 1 tablet by mouth  "Daily.    Calcium Carb-Cholecalciferol (Os-Agusto Calcium + D3) 500-200 MG-UNIT tablet Take 1 tablet by mouth 2 (Two) Times a Day.    cetirizine (zyrTEC) 10 MG tablet Take 1 tablet by mouth Daily.    cyclobenzaprine (FLEXERIL) 10 MG tablet Take 1 tablet by mouth 3 (Three) Times a Day As Needed for Muscle Spasms.    famotidine (PEPCID) 40 MG tablet TAKE 1 TABLET BY MOUTH  DAILY FOR HIVES AND REFLUX    Fluticasone-Umeclidin-Vilant (TRELEGY) 100-62.5-25 MCG/ACT inhaler Inhale 1 puff Daily.    furosemide (LASIX) 20 MG tablet TAKE 1/2 TO 1 TABLET BY  MOUTH DAILY IF NEEDED FOR  LEG EDEMA    gabapentin (NEURONTIN) 400 MG capsule Take 1 capsule by mouth 3 (Three) Times a Day.    ipratropium-albuterol (DUO-NEB) 0.5-2.5 mg/3 ml nebulizer Take 3 mL by nebulization 4 (Four) Times a Day As Needed for Wheezing or Shortness of Air.    lisinopril (PRINIVIL,ZESTRIL) 40 MG tablet Take 1 tablet by mouth Daily.    metoprolol succinate XL (TOPROL-XL) 50 MG 24 hr tablet TAKE 1 TABLET BY MOUTH DAILY    roflumilast (DALIRESP) 500 MCG tablet tablet Take 1 tablet by mouth Daily.    sildenafil (REVATIO) 20 MG tablet Take 1 tablet by mouth Daily. CAN TAKE UP  MG IF NEED     No current facility-administered medications on file prior to visit.       Objective   Vital Signs:   /84 (BP Location: Right arm, Patient Position: Sitting, Cuff Size: Large Adult)   Pulse 75   Temp 97.8 øF (36.6 øC) (Temporal)   Resp 18   Ht 182.9 cm (72\")   Wt 126 kg (277 lb)   SpO2 97%   BMI 37.57 kg/mý          Physical Exam  Vitals and nursing note reviewed.   Constitutional:       General: He is not in acute distress.     Appearance: Normal appearance. He is well-developed. He is obese. He is not ill-appearing.   HENT:      Head: Normocephalic and atraumatic.      Nose: Nose normal.   Eyes:      Conjunctiva/sclera: Conjunctivae normal.      Pupils: Pupils are equal, round, and reactive to light.   Neck:      Thyroid: No thyromegaly.      Vascular: No " JVD.   Cardiovascular:      Rate and Rhythm: Normal rate and regular rhythm.      Heart sounds: Normal heart sounds. No murmur heard.  Pulmonary:      Effort: No respiratory distress.      Breath sounds: Wheezing present.   Musculoskeletal:         General: Normal range of motion.      Cervical back: Normal range of motion.   Lymphadenopathy:      Cervical: No cervical adenopathy.   Skin:     General: Skin is warm and dry.      Findings: No rash.   Neurological:      Mental Status: He is alert and oriented to person, place, and time.   Psychiatric:         Mood and Affect: Mood normal.         Behavior: Behavior normal.         Thought Content: Thought content normal.         Judgment: Judgment normal.            No visits with results within 1 Day(s) from this visit.   Latest known visit with results is:   Office Visit on 06/22/2023   Component Date Value Ref Range Status    Hemoglobin A1C 06/22/2023 6.1  % Final    Lot Number 06/22/2023 NA   Final    Expiration Date 06/22/2023 NA   Final     A1C Last 3 Results          1/9/2023    09:06 6/22/2023    17:19   HGBA1C Last 3 Results   Hemoglobin A1C 6.4  6.1      Lab Results   Component Value Date    CHOL 176 02/11/2017    CHLPL 164 01/09/2023    TRIG 133 01/09/2023    HDL 43 01/09/2023    LDL 97 01/09/2023     Lab Results   Component Value Date    TSH 0.556 07/08/2021     Lab Results   Component Value Date    GLUCOSE 110 (H) 01/09/2023    BUN 18 01/09/2023    CREATININE 1.11 01/09/2023    EGFRIFNONA 74 02/21/2022    EGFRIFAFRI 86 02/21/2022    BCR 16 01/09/2023    K 4.5 01/09/2023    CO2 27 01/09/2023    CALCIUM 10.3 (H) 01/09/2023    PROTENTOTREF 7.1 01/09/2023    ALBUMIN 4.3 01/09/2023    LABIL2 1.5 01/09/2023    AST 14 01/09/2023    ALT 20 01/09/2023     Lab Results   Component Value Date    WBC 6.7 08/02/2022    HGB 14.7 08/02/2022    HCT 43.4 08/02/2022    MCV 90 08/02/2022     08/02/2022                      Assessment and Plan    Diagnoses and all  orders for this visit:    1. Essential hypertension (Primary)  -     Comprehensive Metabolic Panel  -     CBC & Differential    2. Chronic obstructive pulmonary disease with acute exacerbation    3. Body mass index (BMI) of 37.0 to 37.9 in adult    4. Tobacco use    5. Anxiety    6. Hyperlipemia, mixed  -     rosuvastatin (CRESTOR) 10 MG tablet; Take 1 tablet by mouth Daily.  Dispense: 30 tablet; Refill: 12  -     Lipid Panel  -     Comprehensive Metabolic Panel    7. Elevated hemoglobin A1c  -     Hemoglobin A1c    Other orders  -     FLUoxetine (PROzac) 10 MG tablet; 1 daily for 1 week, 1/2 daily for 1 week then discontinue  Dispense: 11 tablet; Refill: 0      Recent sleep study did show sleep apnea with a desaturation to 78%.  He has started CPAP machine, and will start using oxygen while sleeping.  Encouraged to use 18 hours a day and advised cannot become dependent on it because of use but may become dependent on it due to progression of lung disease.    Advised fluoxetine is not likely because of wheezing but his symptoms of anxiety and depression have greatly improved agree it is worth reducing/discontinuing and revisiting the situation if anxiety/depression worsens.    Patient is on Lipitor 10 mg for hyperlipidemia and LDL has been at goal below 100 however patient does have atherosclerotic plaque in multiple arteries including the aorta and is interested in stronger treatment.  We are going to change to Crestor 10 mg and try to get LDL below 70 for best chance at atherosclerotic plaque regression.    Medications Discontinued During This Encounter   Medication Reason    FLUoxetine (PROzac) 20 MG capsule Other- See Medication Note    atorvastatin (LIPITOR) 10 MG tablet Alternate therapy   Class 2 Severe Obesity (BMI >=35 and <=39.9). Obesity-related health conditions include the following: hypertension, coronary heart disease, and dyslipidemias. Obesity is unchanged. BMI is is above average; BMI management  plan is completed. We discussed portion control and increasing exercise.       Follow Up     Return in about 6 weeks (around 11/20/2023) for Recheck, htn, cholesterol.    Patient was given instructions and counseling regarding his condition or for health maintenance advice. Please see specific information pulled into the AVS if appropriate.

## 2023-10-09 ENCOUNTER — OFFICE VISIT (OUTPATIENT)
Dept: FAMILY MEDICINE CLINIC | Facility: CLINIC | Age: 68
End: 2023-10-09
Payer: MEDICARE

## 2023-10-09 VITALS
RESPIRATION RATE: 18 BRPM | HEART RATE: 75 BPM | TEMPERATURE: 97.8 F | SYSTOLIC BLOOD PRESSURE: 138 MMHG | BODY MASS INDEX: 37.52 KG/M2 | DIASTOLIC BLOOD PRESSURE: 84 MMHG | WEIGHT: 277 LBS | OXYGEN SATURATION: 97 % | HEIGHT: 72 IN

## 2023-10-09 DIAGNOSIS — J44.1 CHRONIC OBSTRUCTIVE PULMONARY DISEASE WITH ACUTE EXACERBATION: ICD-10-CM

## 2023-10-09 DIAGNOSIS — Z72.0 TOBACCO USE: ICD-10-CM

## 2023-10-09 DIAGNOSIS — I10 ESSENTIAL HYPERTENSION: Primary | ICD-10-CM

## 2023-10-09 DIAGNOSIS — F41.9 ANXIETY: ICD-10-CM

## 2023-10-09 DIAGNOSIS — E78.2 HYPERLIPEMIA, MIXED: ICD-10-CM

## 2023-10-09 DIAGNOSIS — R73.09 ELEVATED HEMOGLOBIN A1C: ICD-10-CM

## 2023-10-09 PROCEDURE — 1160F RVW MEDS BY RX/DR IN RCRD: CPT | Performed by: FAMILY MEDICINE

## 2023-10-09 PROCEDURE — 1159F MED LIST DOCD IN RCRD: CPT | Performed by: FAMILY MEDICINE

## 2023-10-09 PROCEDURE — 3079F DIAST BP 80-89 MM HG: CPT | Performed by: FAMILY MEDICINE

## 2023-10-09 PROCEDURE — 99214 OFFICE O/P EST MOD 30 MIN: CPT | Performed by: FAMILY MEDICINE

## 2023-10-09 PROCEDURE — 3075F SYST BP GE 130 - 139MM HG: CPT | Performed by: FAMILY MEDICINE

## 2023-10-09 RX ORDER — FLUOXETINE 10 MG/1
TABLET, FILM COATED ORAL
Qty: 11 TABLET | Refills: 0 | Status: SHIPPED | OUTPATIENT
Start: 2023-10-09

## 2023-10-09 RX ORDER — ROSUVASTATIN CALCIUM 10 MG/1
10 TABLET, COATED ORAL DAILY
Qty: 30 TABLET | Refills: 12 | Status: SHIPPED | OUTPATIENT
Start: 2023-10-09

## 2023-10-18 ENCOUNTER — OFFICE VISIT (OUTPATIENT)
Dept: FAMILY MEDICINE CLINIC | Facility: CLINIC | Age: 68
End: 2023-10-18
Payer: MEDICARE

## 2023-10-18 VITALS
DIASTOLIC BLOOD PRESSURE: 82 MMHG | OXYGEN SATURATION: 95 % | RESPIRATION RATE: 18 BRPM | BODY MASS INDEX: 38.22 KG/M2 | TEMPERATURE: 97.1 F | HEART RATE: 120 BPM | SYSTOLIC BLOOD PRESSURE: 132 MMHG | WEIGHT: 282.2 LBS | HEIGHT: 72 IN

## 2023-10-18 DIAGNOSIS — J40 BRONCHITIS WITH ACUTE WHEEZING: ICD-10-CM

## 2023-10-18 DIAGNOSIS — E66.01 CLASS 2 SEVERE OBESITY DUE TO EXCESS CALORIES WITH SERIOUS COMORBIDITY AND BODY MASS INDEX (BMI) OF 38.0 TO 38.9 IN ADULT: ICD-10-CM

## 2023-10-18 DIAGNOSIS — R05.1 ACUTE COUGH: Primary | ICD-10-CM

## 2023-10-18 DIAGNOSIS — J44.1 CHRONIC OBSTRUCTIVE PULMONARY DISEASE WITH ACUTE EXACERBATION: ICD-10-CM

## 2023-10-18 LAB
EXPIRATION DATE: NORMAL
FLUAV AG UPPER RESP QL IA.RAPID: NOT DETECTED
FLUBV AG UPPER RESP QL IA.RAPID: NOT DETECTED
INTERNAL CONTROL: NORMAL
Lab: NORMAL
SARS-COV-2 AG UPPER RESP QL IA.RAPID: NOT DETECTED

## 2023-10-18 PROCEDURE — 3075F SYST BP GE 130 - 139MM HG: CPT | Performed by: FAMILY MEDICINE

## 2023-10-18 PROCEDURE — 3079F DIAST BP 80-89 MM HG: CPT | Performed by: FAMILY MEDICINE

## 2023-10-18 PROCEDURE — 99214 OFFICE O/P EST MOD 30 MIN: CPT | Performed by: FAMILY MEDICINE

## 2023-10-18 PROCEDURE — 1159F MED LIST DOCD IN RCRD: CPT | Performed by: FAMILY MEDICINE

## 2023-10-18 PROCEDURE — 87428 SARSCOV & INF VIR A&B AG IA: CPT | Performed by: FAMILY MEDICINE

## 2023-10-18 PROCEDURE — 1160F RVW MEDS BY RX/DR IN RCRD: CPT | Performed by: FAMILY MEDICINE

## 2023-10-18 PROCEDURE — 96372 THER/PROPH/DIAG INJ SC/IM: CPT | Performed by: FAMILY MEDICINE

## 2023-10-18 RX ORDER — METHYLPREDNISOLONE ACETATE 80 MG/ML
80 INJECTION, SUSPENSION INTRA-ARTICULAR; INTRALESIONAL; INTRAMUSCULAR; SOFT TISSUE ONCE
Status: COMPLETED | OUTPATIENT
Start: 2023-10-18 | End: 2023-10-18

## 2023-10-18 RX ORDER — CEFTRIAXONE SODIUM 250 MG/1
1000 INJECTION, POWDER, FOR SOLUTION INTRAMUSCULAR; INTRAVENOUS ONCE
Status: COMPLETED | OUTPATIENT
Start: 2023-10-18 | End: 2023-10-18

## 2023-10-18 RX ORDER — PREDNISONE 10 MG/1
10 TABLET ORAL TAKE AS DIRECTED
Qty: 35 TABLET | Refills: 0 | Status: SHIPPED | OUTPATIENT
Start: 2023-10-18 | End: 2023-11-02

## 2023-10-18 RX ORDER — DEXAMETHASONE SODIUM PHOSPHATE 10 MG/ML
10 INJECTION INTRAMUSCULAR; INTRAVENOUS ONCE
Status: COMPLETED | OUTPATIENT
Start: 2023-10-18 | End: 2023-10-18

## 2023-10-18 RX ORDER — DOXYCYCLINE 100 MG/1
100 CAPSULE ORAL 2 TIMES DAILY
Qty: 20 CAPSULE | Refills: 0 | Status: SHIPPED | OUTPATIENT
Start: 2023-10-18

## 2023-10-18 RX ADMIN — METHYLPREDNISOLONE ACETATE 80 MG: 80 INJECTION, SUSPENSION INTRA-ARTICULAR; INTRALESIONAL; INTRAMUSCULAR; SOFT TISSUE at 12:39

## 2023-10-18 RX ADMIN — DEXAMETHASONE SODIUM PHOSPHATE 10 MG: 10 INJECTION INTRAMUSCULAR; INTRAVENOUS at 12:38

## 2023-10-18 RX ADMIN — CEFTRIAXONE SODIUM 1000 MG: 250 INJECTION, POWDER, FOR SOLUTION INTRAMUSCULAR; INTRAVENOUS at 12:36

## 2023-10-18 NOTE — PROGRESS NOTES
"Chief Complaint  URI and Hypertension    Subjective     CC  Problem List  Visit Diagnosis   Encounters  Notes  Medications  Labs  Result Review Imaging  Media    James Cleveland presents to [facility] for   URI   This is a new problem. The current episode started in the past 7 days (10/14/2023). The problem has been gradually worsening. The maximum temperature recorded prior to his arrival was 103 - 104 F. The fever has been present for 1 to 2 days. Associated symptoms include congestion, coughing, headaches, nausea and a sore throat. Pertinent negatives include no abdominal pain, chest pain, diarrhea, ear pain, plugged ear sensation, rash or vomiting. He has tried nothing for the symptoms.   Hypertension  This is a chronic problem. The current episode started more than 1 year ago. Associated symptoms include headaches. Pertinent negatives include no chest pain, orthopnea, palpitations, PND or shortness of breath. Risk factors for coronary artery disease include dyslipidemia, male gender and obesity. Current antihypertension treatment includes beta blockers, calcium channel blockers and ACE inhibitors. The current treatment provides mild improvement. There are no compliance problems.      Review of Systems   HENT:  Positive for congestion and sore throat. Negative for ear pain.    Respiratory:  Positive for cough. Negative for shortness of breath.    Cardiovascular:  Negative for chest pain, palpitations, orthopnea and PND.   Gastrointestinal:  Positive for constipation and nausea. Negative for abdominal pain, diarrhea and vomiting.   Skin:  Negative for rash.   Hematological:  Negative for adenopathy. Does not bruise/bleed easily.        Objective   Vital Signs:   /82 (BP Location: Right arm, Patient Position: Sitting, Cuff Size: Large Adult)   Pulse 120   Temp 97.1 °F (36.2 °C) (Temporal)   Resp 18   Ht 182.9 cm (72\")   Wt 128 kg (282 lb 3.2 oz)   SpO2 95% " Comment: room air  BMI 38.27 kg/m²     Physical Exam  Constitutional:       General: He is not in acute distress.  Cardiovascular:      Rate and Rhythm: Normal rate and regular rhythm.      Heart sounds: No murmur heard.  Pulmonary:      Effort: Pulmonary effort is normal.      Breath sounds: Normal breath sounds.   Musculoskeletal:      Cervical back: Neck supple.   Lymphadenopathy:      Cervical: No cervical adenopathy.   Skin:     Findings: No rash.   Neurological:      Mental Status: He is alert.        Result Review :Labs  Result Review  Imaging  Med Tab  Media                 Assessment and Plan CC Problem List  Visit Diagnosis  ROS  Review (Popup)  [facility] Maintenance  Quality  BestPractice  Medications  SmartSets  SnapShot Encounters  Media  Problem List Items Addressed This Visit          Unprioritized    Class 2 severe obesity due to excess calories with serious comorbidity and body mass index (BMI) of 37.0 to 37.9 in adult    Current Assessment & Plan     Patient's (Body mass index is 38.27 kg/m².) indicates that they are obese (BMI >30) with health conditions that include hypertension, diabetes mellitus, and dyslipidemias . Weight is improving with treatment. BMI  is above average; BMI management plan is completed. We discussed portion control and increasing exercise.          Acute cough - Primary    Relevant Orders    POCT SARS-CoV-2 + Flu Antigen BINU (Completed)    Chronic obstructive pulmonary disease with acute exacerbation    Overview     Systemic steroids, fluids,  continue inhalers f.u if no improvement over the next 24 - 48 hrs.          Relevant Medications    predniSONE (DELTASONE) 10 MG tablet     Other Visit Diagnoses       Bronchitis with acute wheezing        abx steroids f/u if no improvement    Relevant Medications    doxycycline (MONODOX) 100 MG capsule    cefTRIAXone (ROCEPHIN) injection 1,000 mg (Completed)            Follow Up Instructions Charge Capture  Follow-up  Communications  Return if symptoms worsen or fail to improve.  Patient was given instructions and counseling regarding his condition or for health maintenance advice. Please see specific information pulled into the AVS if appropriate.

## 2023-10-18 NOTE — ASSESSMENT & PLAN NOTE
Patient's (Body mass index is 38.27 kg/m².) indicates that they are obese (BMI >30) with health conditions that include hypertension, diabetes mellitus, and dyslipidemias . Weight is improving with treatment. BMI  is above average; BMI management plan is completed. We discussed portion control and increasing exercise.

## 2023-10-20 ENCOUNTER — TELEPHONE (OUTPATIENT)
Dept: FAMILY MEDICINE CLINIC | Facility: CLINIC | Age: 68
End: 2023-10-20

## 2023-10-20 DIAGNOSIS — R05.1 ACUTE COUGH: ICD-10-CM

## 2023-10-20 DIAGNOSIS — R04.2 HEMOPTYSIS: Primary | ICD-10-CM

## 2023-10-20 DIAGNOSIS — J43.9 PULMONARY EMPHYSEMA, UNSPECIFIED EMPHYSEMA TYPE: ICD-10-CM

## 2023-10-20 DIAGNOSIS — J40 BRONCHITIS: ICD-10-CM

## 2023-10-20 NOTE — TELEPHONE ENCOUNTER
Caller: James Cleveland    Relationship to patient: Self    Best call back number: [phone]    Patient is needing:     PATIENT WAS SEEN ON 10/18 AND SAID HE IS NOW COUGHING UP BLOOD AND WANTED TO LET DR. PORTILLO KNOW, HE DENIED ANY OTHER SYMPTOMS    [employer] TRANSFERRED TO OFFICE

## 2023-10-20 NOTE — TELEPHONE ENCOUNTER
James said he had cough and fever. Seen  and was put on medications. He feels better and is improving, no more fever,  but states he is coughing up blood. It is still phlegm but has quite a bit of blood in it. He said it is bright red no dark red and aside from it being mixed in with phlegm it does not look like chunks or clots. Patient wants to come in and see you Monday. Please advise

## 2023-10-20 NOTE — PROGRESS NOTES
Chief Complaint  Cough and Nasal Congestion    History of Present Illness  James Cleveland presents today for cough    Cough: Patient complains of productive cough with sputum described as blood streaked and clear .  Symptoms began 5 days ago.  The cough is with blood tinged sputum, with wheezing, with shortness of breath and is aggravated by nothing Associated symptoms include:shortness of breath and sputum production. Patient does not have new pets. Patient does not have a history of asthma. Patient does have a history of environmental allergens. Patient has not recent travel. Patient does have a history of smoking. Patient  had previous Chest X-ray. Patient has had a PPD done.     Patient was seen by Dr. Chen on Wednesday, 5 days ago for bronchitis and started on doxycycline and prednisone.  Following day he started coughing up blood.  He has had a couple of relatively large quarter size clots.  He does report some sweats, wheezing, and shortness of breath.  He denies any fevers or increase shortness of breath or increased wheezing.  He is continuing the steroid and antibiotic.  He did have a chest x-ray today.  He did have chest CT in May.    Patient Care Team:  Ashwini Ndiaye MD as PCP - General (Family Medicine)   Current Outpatient Medications on File Prior to Visit   Medication Sig    albuterol sulfate  (90 Base) MCG/ACT inhaler INHALE TWO PUFFS EVERY 4 HOURS AS NEEDED FOR WHEEZING    amLODIPine (NORVASC) 5 MG tablet Take 1 tablet by mouth Daily.    aspirin (aspirin) 81 MG EC tablet Take 1 tablet by mouth Daily.    Calcium Carb-Cholecalciferol (Os-Agusto Calcium + D3) 500-200 MG-UNIT tablet Take 1 tablet by mouth 2 (Two) Times a Day.    cetirizine (zyrTEC) 10 MG tablet Take 1 tablet by mouth Daily.    CINNAMON PO Take 2 capsules by mouth Daily.    cyclobenzaprine (FLEXERIL) 10 MG tablet Take 1 tablet by mouth 3 (Three) Times a Day As Needed for Muscle Spasms.    doxycycline (MONODOX) 100 MG  "capsule Take 1 capsule by mouth 2 (Two) Times a Day.    ELDERBERRY PO Take 2 capsules by mouth Daily.    famotidine (PEPCID) 40 MG tablet TAKE 1 TABLET BY MOUTH  DAILY FOR HIVES AND REFLUX    FLUoxetine (PROzac) 10 MG tablet 1 daily for 1 week, 1/2 daily for 1 week then discontinue    Fluticasone-Umeclidin-Vilant (TRELEGY) 100-62.5-25 MCG/ACT inhaler Inhale 1 puff Daily.    furosemide (LASIX) 20 MG tablet TAKE 1/2 TO 1 TABLET BY  MOUTH DAILY IF NEEDED FOR  LEG EDEMA    gabapentin (NEURONTIN) 400 MG capsule Take 1 capsule by mouth 3 (Three) Times a Day.    GARLIC PO Take 1 capsule by mouth Daily.    ipratropium-albuterol (DUO-NEB) 0.5-2.5 mg/3 ml nebulizer Take 3 mL by nebulization 4 (Four) Times a Day As Needed for Wheezing or Shortness of Air.    lisinopril (PRINIVIL,ZESTRIL) 40 MG tablet Take 1 tablet by mouth Daily.    metoprolol succinate XL (TOPROL-XL) 50 MG 24 hr tablet TAKE 1 TABLET BY MOUTH DAILY    OREGANO PO Take 1 capsule by mouth Daily.    predniSONE (DELTASONE) 10 MG tablet Take 1 tablet by mouth Take As Directed for 15 days. 5 tab x 1day, 4 tab x 2 day, 3 tab x 3 day, 2 tab x 4 day, 1 tab x 5 day    roflumilast (DALIRESP) 500 MCG tablet tablet Take 1 tablet by mouth Daily.    rosuvastatin (CRESTOR) 10 MG tablet Take 1 tablet by mouth Daily.    sildenafil (REVATIO) 20 MG tablet Take 1 tablet by mouth Daily. CAN TAKE UP  MG IF NEED    Turmeric (QC TUMERIC COMPLEX PO) Take 1 capsule by mouth Daily.     No current facility-administered medications on file prior to visit.       Objective   Vital Signs:   /74 (BP Location: Right arm, Patient Position: Sitting, Cuff Size: Large Adult)   Pulse 74   Temp 98.4 °F (36.9 °C) (Temporal)   Resp 18   Ht 182.9 cm (72\")   Wt 126 kg (277 lb)   SpO2 90%   BMI 37.57 kg/m²    BP Readings from Last 3 Encounters:   10/23/23 128/74   10/18/23 132/82   10/09/23 138/84     Wt Readings from Last 3 Encounters:   10/23/23 126 kg (277 lb)   10/18/23 128 kg (282 " lb 3.2 oz)   10/09/23 126 kg (277 lb)         Physical Exam  Vitals and nursing note reviewed.   Constitutional:       General: He is not in acute distress.     Appearance: Normal appearance. He is well-developed. He is obese. He is not ill-appearing.   HENT:      Head: Normocephalic and atraumatic.      Nose: Nose normal.   Eyes:      Conjunctiva/sclera: Conjunctivae normal.      Pupils: Pupils are equal, round, and reactive to light.   Neck:      Thyroid: No thyromegaly.      Vascular: No JVD.   Cardiovascular:      Rate and Rhythm: Normal rate and regular rhythm.      Heart sounds: Normal heart sounds. No murmur heard.  Pulmonary:      Effort: Pulmonary effort is normal. No respiratory distress.      Breath sounds: Wheezing present. No rhonchi or rales.   Musculoskeletal:         General: Normal range of motion.      Cervical back: Normal range of motion.   Lymphadenopathy:      Cervical: No cervical adenopathy.   Skin:     General: Skin is warm and dry.      Findings: No rash.   Neurological:      Mental Status: He is alert and oriented to person, place, and time.   Psychiatric:         Mood and Affect: Mood normal.         Behavior: Behavior normal.         Thought Content: Thought content normal.         Judgment: Judgment normal.            No visits with results within 1 Day(s) from this visit.   Latest known visit with results is:   Office Visit on 10/18/2023   Component Date Value Ref Range Status    SARS Antigen 10/18/2023 Not Detected  Not Detected, Presumptive Negative Final    Influenza A Antigen BINU 10/18/2023 Not Detected  Not Detected Final    Influenza B Antigen BINU 10/18/2023 Not Detected  Not Detected Final    Internal Control 10/18/2023 Passed  Passed Final    Lot Number 10/18/2023 [id number]   Final    Expiration Date 10/18/2023 04/18/2024   Final     A1C Last 3 Results          1/9/2023    09:06 6/22/2023    17:19   HGBA1C Last 3 Results   Hemoglobin A1C 6.4  6.1      Lab Results   Component  Value Date    CHOL 176 02/11/2017    CHLPL 164 01/09/2023    TRIG 133 01/09/2023    HDL 43 01/09/2023    LDL 97 01/09/2023     Lab Results   Component Value Date    TSH 0.556 07/08/2021     Lab Results   Component Value Date    GLUCOSE 110 (H) 01/09/2023    BUN 18 01/09/2023    CREATININE 1.11 01/09/2023    EGFRIFNONA 74 02/21/2022    EGFRIFAFRI 86 02/21/2022    BCR 16 01/09/2023    K 4.5 01/09/2023    CO2 27 01/09/2023    CALCIUM 10.3 (H) 01/09/2023    PROTENTOTREF 7.1 01/09/2023    ALBUMIN 4.3 01/09/2023    LABIL2 1.5 01/09/2023    AST 14 01/09/2023    ALT 20 01/09/2023     Lab Results   Component Value Date    WBC 6.7 08/02/2022    HGB 14.7 08/02/2022    HCT 43.4 08/02/2022    MCV 90 08/02/2022     08/02/2022                 XR Chest 2 View    Result Date: 10/23/2023  Impression: No acute process identified Electronically Signed: Singh Madden MD  10/23/2023 11:23 AM CDT  Workstation ID: [id number]   Hemoptysis     Assessment and Plan    Diagnoses and all orders for this visit:    1. Chronic obstructive pulmonary disease with acute exacerbation (Primary)    2. Acute cough    3. Class 2 severe obesity due to excess calories with serious comorbidity and body mass index (BMI) of 37.0 to 37.9 in adult  Assessment & Plan:  Patient's (Body mass index is 37.57 kg/m².) indicates that they are morbidly/severely obese (BMI > 40 or > 35 with obesity - related health condition) with health conditions that include hypertension and dyslipidemias . Weight is improving with lifestyle modifications. BMI  is above average; BMI management plan is completed. We discussed portion control and increasing exercise.       4. Tobacco use    5. Hemoptysis  -     Comprehensive Metabolic Panel  -     CBC & Differential    6. BRBPR (bright red blood per rectum)  -     CBC & Differential    7. Elevated hemoglobin A1c  -     Hemoglobin A1c         Hemoptysis with partially treated bronchitis, completed doxycycline and prednisone.  Chest  x-ray today is negative for acute changes, if hemoptysis continues would recommend repeating chest CT for further evaluation.    Due for colonoscopy this month, advised to schedule, previous with Dr Isaac showed diverticula and internal hemorrhoids.     Checking CBC and CMP  Patient has history of elevated A1c, repeating A1c today.    There are no discontinued medications.      Follow Up     Return in about 29 days (around 11/21/2023) for Recheck, as previously scheduled.    Patient was given instructions and counseling regarding his condition or for health maintenance advice. Please see specific information pulled into the AVS if appropriate.

## 2023-10-20 NOTE — TELEPHONE ENCOUNTER
Please find a place to work him into the schedule on Monday.     I am ordering a chest x-ray.  Ask him to have this done downstairs on Monday prior to appointment.   Reviewing note he was started on doxycycline and prednisone on Wednesday.

## 2023-10-23 ENCOUNTER — OFFICE VISIT (OUTPATIENT)
Dept: FAMILY MEDICINE CLINIC | Facility: CLINIC | Age: 68
End: 2023-10-23
Payer: MEDICARE

## 2023-10-23 ENCOUNTER — HOSPITAL ENCOUNTER (OUTPATIENT)
Dept: GENERAL RADIOLOGY | Facility: HOSPITAL | Age: 68
Discharge: HOME | End: 2023-10-23
Admitting: FAMILY MEDICINE
Payer: MEDICARE

## 2023-10-23 VITALS
DIASTOLIC BLOOD PRESSURE: 74 MMHG | TEMPERATURE: 98.4 F | HEART RATE: 74 BPM | BODY MASS INDEX: 37.52 KG/M2 | WEIGHT: 277 LBS | HEIGHT: 72 IN | SYSTOLIC BLOOD PRESSURE: 128 MMHG | RESPIRATION RATE: 18 BRPM | OXYGEN SATURATION: 90 %

## 2023-10-23 DIAGNOSIS — J43.9 PULMONARY EMPHYSEMA, UNSPECIFIED EMPHYSEMA TYPE: ICD-10-CM

## 2023-10-23 DIAGNOSIS — K62.5 BRBPR (BRIGHT RED BLOOD PER RECTUM): ICD-10-CM

## 2023-10-23 DIAGNOSIS — R05.1 ACUTE COUGH: ICD-10-CM

## 2023-10-23 DIAGNOSIS — R73.09 ELEVATED HEMOGLOBIN A1C: ICD-10-CM

## 2023-10-23 DIAGNOSIS — J40 BRONCHITIS: ICD-10-CM

## 2023-10-23 DIAGNOSIS — R04.2 HEMOPTYSIS: ICD-10-CM

## 2023-10-23 DIAGNOSIS — J44.1 CHRONIC OBSTRUCTIVE PULMONARY DISEASE WITH ACUTE EXACERBATION: Primary | ICD-10-CM

## 2023-10-23 DIAGNOSIS — E66.01 CLASS 2 SEVERE OBESITY DUE TO EXCESS CALORIES WITH SERIOUS COMORBIDITY AND BODY MASS INDEX (BMI) OF 37.0 TO 37.9 IN ADULT: ICD-10-CM

## 2023-10-23 DIAGNOSIS — Z72.0 TOBACCO USE: ICD-10-CM

## 2023-10-23 PROCEDURE — 99214 OFFICE O/P EST MOD 30 MIN: CPT | Performed by: FAMILY MEDICINE

## 2023-10-23 PROCEDURE — 71046 X-RAY EXAM CHEST 2 VIEWS: CPT

## 2023-10-23 PROCEDURE — 3074F SYST BP LT 130 MM HG: CPT | Performed by: FAMILY MEDICINE

## 2023-10-23 PROCEDURE — 1159F MED LIST DOCD IN RCRD: CPT | Performed by: FAMILY MEDICINE

## 2023-10-23 PROCEDURE — 1160F RVW MEDS BY RX/DR IN RCRD: CPT | Performed by: FAMILY MEDICINE

## 2023-10-23 PROCEDURE — 3078F DIAST BP <80 MM HG: CPT | Performed by: FAMILY MEDICINE

## 2023-10-23 NOTE — ASSESSMENT & PLAN NOTE
Patient's (Body mass index is 37.57 kg/m².) indicates that they are morbidly/severely obese (BMI > 40 or > 35 with obesity - related health condition) with health conditions that include hypertension and dyslipidemias . Weight is improving with lifestyle modifications. BMI  is above average; BMI management plan is completed. We discussed portion control and increasing exercise.

## 2023-10-24 LAB
ALBUMIN SERPL-MCNC: 4 G/DL (ref 3.9–4.9)
ALBUMIN/GLOB SERPL: 1.6 {RATIO} (ref 1.2–2.2)
ALP SERPL-CCNC: 76 IU/L (ref 44–121)
ALT SERPL-CCNC: 36 IU/L (ref 0–44)
AST SERPL-CCNC: 17 IU/L (ref 0–40)
BASOPHILS # BLD AUTO: 0 X10E3/UL (ref 0–0.2)
BASOPHILS NFR BLD AUTO: 0 %
BILIRUB SERPL-MCNC: 0.2 MG/DL (ref 0–1.2)
BUN SERPL-MCNC: 24 MG/DL (ref 8–27)
BUN/CREAT SERPL: 23 (ref 10–24)
CALCIUM SERPL-MCNC: 9.5 MG/DL (ref 8.6–10.2)
CHLORIDE SERPL-SCNC: 99 MMOL/L (ref 96–106)
CO2 SERPL-SCNC: 21 MMOL/L (ref 20–29)
CREAT SERPL-MCNC: 1.03 MG/DL (ref 0.76–1.27)
EGFRCR SERPLBLD CKD-EPI 2021: 80 ML/MIN/1.73
EOSINOPHIL # BLD AUTO: 0.1 X10E3/UL (ref 0–0.4)
EOSINOPHIL NFR BLD AUTO: 1 %
ERYTHROCYTE [DISTWIDTH] IN BLOOD BY AUTOMATED COUNT: 13.4 % (ref 11.6–15.4)
GLOBULIN SER CALC-MCNC: 2.5 G/DL (ref 1.5–4.5)
GLUCOSE SERPL-MCNC: 104 MG/DL (ref 70–99)
HBA1C MFR BLD: 6.4 % (ref 4.8–5.6)
HCT VFR BLD AUTO: 43.6 % (ref 37.5–51)
HGB BLD-MCNC: 14.4 G/DL (ref 13–17.7)
IMM GRANULOCYTES # BLD AUTO: 0.3 X10E3/UL (ref 0–0.1)
IMM GRANULOCYTES NFR BLD AUTO: 2 %
LYMPHOCYTES # BLD AUTO: 2 X10E3/UL (ref 0.7–3.1)
LYMPHOCYTES NFR BLD AUTO: 12 %
MCH RBC QN AUTO: 30.1 PG (ref 26.6–33)
MCHC RBC AUTO-ENTMCNC: 33 G/DL (ref 31.5–35.7)
MCV RBC AUTO: 91 FL (ref 79–97)
MONOCYTES # BLD AUTO: 0.7 X10E3/UL (ref 0.1–0.9)
MONOCYTES NFR BLD AUTO: 4 %
NEUTROPHILS # BLD AUTO: 14.4 X10E3/UL (ref 1.4–7)
NEUTROPHILS NFR BLD AUTO: 81 %
PLATELET # BLD AUTO: 331 X10E3/UL (ref 150–450)
POTASSIUM SERPL-SCNC: 5 MMOL/L (ref 3.5–5.2)
PROT SERPL-MCNC: 6.5 G/DL (ref 6–8.5)
RBC # BLD AUTO: 4.78 X10E6/UL (ref 4.14–5.8)
SODIUM SERPL-SCNC: 136 MMOL/L (ref 134–144)
WBC # BLD AUTO: 17.5 X10E3/UL (ref 3.4–10.8)

## 2023-10-31 DIAGNOSIS — K21.9 GASTROESOPHAGEAL REFLUX DISEASE, UNSPECIFIED WHETHER ESOPHAGITIS PRESENT: ICD-10-CM

## 2023-10-31 DIAGNOSIS — L50.9 URTICARIA: ICD-10-CM

## 2023-11-01 RX ORDER — FAMOTIDINE 40 MG/1
40 TABLET, FILM COATED ORAL DAILY
Qty: 100 TABLET | Refills: 3 | Status: SHIPPED | OUTPATIENT
Start: 2023-11-01

## 2023-11-03 DIAGNOSIS — S42.022S CLOSED DISPLACED FRACTURE OF SHAFT OF LEFT CLAVICLE, SEQUELA: ICD-10-CM

## 2023-11-03 DIAGNOSIS — E78.2 HYPERLIPEMIA, MIXED: ICD-10-CM

## 2023-11-03 DIAGNOSIS — G54.0 BRACHIAL PLEXUS NEUROPATHY: ICD-10-CM

## 2023-11-07 ENCOUNTER — TELEPHONE (OUTPATIENT)
Dept: FAMILY MEDICINE CLINIC | Facility: CLINIC | Age: 68
End: 2023-11-07
Payer: MEDICARE

## 2023-11-07 DIAGNOSIS — Z12.11 SCREENING FOR COLON CANCER: Primary | ICD-10-CM

## 2023-11-07 RX ORDER — ATORVASTATIN CALCIUM 10 MG/1
10 TABLET, FILM COATED ORAL DAILY
Qty: 30 TABLET | Refills: 11 | OUTPATIENT
Start: 2023-11-07

## 2023-11-07 RX ORDER — GABAPENTIN 400 MG/1
400 CAPSULE ORAL 3 TIMES DAILY
Qty: 90 CAPSULE | Refills: 0 | Status: SHIPPED | OUTPATIENT
Start: 2023-11-07

## 2023-11-07 NOTE — TELEPHONE ENCOUNTER
I called and spoke with the patient regarding the information below. He said he does not want to go back through gastroenterology of [facility] because Dr. Isaac is not there anymore. He informed me that he is going through [facility] in [place] but the referral has to be processed through [facility]. I informed the patient that we would need more information on where exactly it needs to go through at [facility]. He verbalized understanding, he is going to call them tomorrow and then call me back.

## 2023-11-07 NOTE — TELEPHONE ENCOUNTER
Caller: James Cleveland    Relationship: Self    Best call back number: [phone]    What orders are you requesting (i.e. lab or imaging): COLONOSCOPY     In what timeframe would the patient need to come in: ANYTIME     Where will you receive your lab/imaging services: [facility]     Additional notes: PATIENT STATES HE IS REQUESTING A CALL BACK TO LET HIM KNOW THE ORDER HAS BEEN PLACED.

## 2023-11-07 NOTE — TELEPHONE ENCOUNTER
Patient has no longer taking Lipitor was changed to Crestor(rosuvastatin).  Renewing gabapentin for 1 month we will discuss refills at upcoming appointment (I typically do not refill any controlled substances for more than 3 months)

## 2023-11-07 NOTE — TELEPHONE ENCOUNTER
Please clarify that James is requesting referral for a colonoscopy at [facility] in [place].  If this is correct does he have a particular gastroenterologist or surgeon in mind to perform the procedure?  I have never referred to [facility] for a screening colonoscopy and do not know if the order should be placed to GI or surgery.  Is there a reason he does not want to return to gastroenterology of [facility]?     Patient is due for colonoscopy this month, previous was November 22, 2013  [facility] (showed diverticula and internal hemorrhoids.)

## 2023-11-08 ENCOUNTER — TELEPHONE (OUTPATIENT)
Dept: FAMILY MEDICINE CLINIC | Facility: CLINIC | Age: 68
End: 2023-11-08

## 2023-11-08 NOTE — TELEPHONE ENCOUNTER
PATIENT: VICENTE WHIPPLE  : 1955  VACCINE:  COVID BOOSTER   DATE: 10/31/23  LOCATION: [facility] [place]

## 2023-11-08 NOTE — TELEPHONE ENCOUNTER
Attempted to contact patient regarding the information below, no answer and unable to lvm full vm box

## 2023-11-08 NOTE — TELEPHONE ENCOUNTER
PATIENT PROVIDED THE FAX # OF THE CLINIC HE WANTS TO HAVE THE PROCEDURE DONE:  [phone]  [facility]

## 2023-11-08 NOTE — TELEPHONE ENCOUNTER
Attempted to call the patient for a third time regarding the information below. No answer and unable to lvm.

## 2023-11-09 DIAGNOSIS — I10 ESSENTIAL HYPERTENSION: ICD-10-CM

## 2023-11-09 RX ORDER — AMLODIPINE BESYLATE 5 MG/1
5 TABLET ORAL DAILY
Qty: 30 TABLET | Refills: 11 | Status: SHIPPED | OUTPATIENT
Start: 2023-11-09

## 2023-11-14 DIAGNOSIS — D72.829 LEUKOCYTOSIS, UNSPECIFIED TYPE: Primary | ICD-10-CM
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
## 2023-11-17 LAB
CHOLEST SERPL-MCNC: 138 MG/DL (ref 100–199)
HDLC SERPL-MCNC: 48 MG/DL
LDLC SERPL CALC-MCNC: 74 MG/DL (ref 0–99)
TRIGL SERPL-MCNC: 84 MG/DL (ref 0–149)
VLDLC SERPL CALC-MCNC: 16 MG/DL (ref 5–40)

## 2023-11-21 ENCOUNTER — OFFICE VISIT (OUTPATIENT)
Dept: FAMILY MEDICINE CLINIC | Facility: CLINIC | Age: 68
End: 2023-11-21
Payer: MEDICARE

## 2023-11-21 VITALS
SYSTOLIC BLOOD PRESSURE: 130 MMHG | WEIGHT: 277 LBS | TEMPERATURE: 98.7 F | DIASTOLIC BLOOD PRESSURE: 80 MMHG | RESPIRATION RATE: 18 BRPM | HEIGHT: 72 IN | BODY MASS INDEX: 37.52 KG/M2 | OXYGEN SATURATION: 90 % | HEART RATE: 120 BPM

## 2023-11-21 DIAGNOSIS — G54.0 BRACHIAL PLEXUS NEUROPATHY: ICD-10-CM

## 2023-11-21 DIAGNOSIS — I10 ESSENTIAL HYPERTENSION: Primary | ICD-10-CM

## 2023-11-21 DIAGNOSIS — Z72.0 TOBACCO USE: ICD-10-CM

## 2023-11-21 DIAGNOSIS — E66.01 CLASS 2 SEVERE OBESITY DUE TO EXCESS CALORIES WITH SERIOUS COMORBIDITY AND BODY MASS INDEX (BMI) OF 37.0 TO 37.9 IN ADULT: ICD-10-CM

## 2023-11-21 DIAGNOSIS — E78.2 HYPERLIPEMIA, MIXED: ICD-10-CM

## 2023-11-21 RX ORDER — ROSUVASTATIN CALCIUM 10 MG/1
10 TABLET, COATED ORAL DAILY
Qty: 90 TABLET | Refills: 3 | Status: SHIPPED | OUTPATIENT
Start: 2023-11-21 | End: 2023-12-06 | Stop reason: SDUPTHER

## 2023-11-27 DIAGNOSIS — G54.0 BRACHIAL PLEXUS NEUROPATHY: ICD-10-CM

## 2023-11-27 DIAGNOSIS — S42.022S CLOSED DISPLACED FRACTURE OF SHAFT OF LEFT CLAVICLE, SEQUELA: ICD-10-CM

## 2023-11-28 RX ORDER — GABAPENTIN 400 MG/1
400 CAPSULE ORAL 3 TIMES DAILY
Qty: 90 CAPSULE | Refills: 11 | Status: SHIPPED | OUTPATIENT
Start: 2023-11-28

## 2023-11-29 ENCOUNTER — OFFICE VISIT (OUTPATIENT)
Age: 68
End: 2023-11-29
Payer: MEDICARE

## 2023-11-29 VITALS
TEMPERATURE: 100 F | SYSTOLIC BLOOD PRESSURE: 155 MMHG | DIASTOLIC BLOOD PRESSURE: 89 MMHG | OXYGEN SATURATION: 95 % | HEIGHT: 72 IN | HEART RATE: 74 BPM | BODY MASS INDEX: 37.25 KG/M2 | WEIGHT: 275 LBS

## 2023-11-29 DIAGNOSIS — Z12.11 ENCOUNTER FOR SCREENING COLONOSCOPY FOR NON-HIGH-RISK PATIENT: Primary | ICD-10-CM

## 2023-11-29 RX ORDER — SODIUM, POTASSIUM,MAG SULFATES 17.5-3.13G
SOLUTION, RECONSTITUTED, ORAL ORAL
Qty: 177 ML | Refills: 0 | Status: SHIPPED | OUTPATIENT
Start: 2023-11-29 | End: 2023-11-30 | Stop reason: SDUPTHER

## 2023-11-29 RX ORDER — SODIUM CHLORIDE, SODIUM LACTATE, POTASSIUM CHLORIDE, CALCIUM CHLORIDE 600; 310; 30; 20 MG/100ML; MG/100ML; MG/100ML; MG/100ML
30 INJECTION, SOLUTION INTRAVENOUS CONTINUOUS
OUTPATIENT
Start: 2023-11-29
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
## 2023-11-30 RX ORDER — SODIUM, POTASSIUM,MAG SULFATES 17.5-3.13G
SOLUTION, RECONSTITUTED, ORAL ORAL
Qty: 177 ML | Refills: 0 | Status: SHIPPED | OUTPATIENT
Start: 2023-11-30

## 2023-12-06 DIAGNOSIS — E78.2 HYPERLIPEMIA, MIXED: ICD-10-CM

## 2023-12-06 RX ORDER — ROSUVASTATIN CALCIUM 10 MG/1
10 TABLET, COATED ORAL DAILY
Qty: 90 TABLET | Refills: 3 | Status: SHIPPED | OUTPATIENT
Start: 2023-12-06

## 2023-12-07 ENCOUNTER — PATIENT ROUNDING (BHMG ONLY) (OUTPATIENT)
Age: 68
End: 2023-12-07
Payer: MEDICARE

## 2023-12-07 NOTE — PROGRESS NOTES
December 7, 2023    Hello, may I speak with James Cleveland?    My name is Deidre     I am  with [facility]  [facility]  [address] [place] [address] [phone].    Before we get started may I verify your date of birth? 1955    I am calling to officially welcome you to our practice and ask about your recent visit. Is this a good time to talk? yes    Tell me about your visit with us. What things went well?         We're always looking for ways to make our patients' experiences even better. Do you have recommendations on ways we may improve?  no    Overall were you satisfied with your first visit to our practice? yes       I appreciate you taking the time to speak with me today. Is there anything else I can do for you? no      Thank you, and have a great day.

## 2023-12-08 ENCOUNTER — HOSPITAL ENCOUNTER (OUTPATIENT)
Facility: HOSPITAL | Age: 68
Setting detail: HOSPITAL OUTPATIENT SURGERY
Discharge: HOME | End: 2023-12-08
Attending: STUDENT IN AN ORGANIZED HEALTH CARE EDUCATION/TRAINING PROGRAM | Admitting: STUDENT IN AN ORGANIZED HEALTH CARE EDUCATION/TRAINING PROGRAM
Payer: MEDICARE

## 2023-12-08 ENCOUNTER — ANESTHESIA (OUTPATIENT)
Dept: GASTROENTEROLOGY | Facility: HOSPITAL | Age: 68
End: 2023-12-08
Payer: MEDICARE

## 2023-12-08 ENCOUNTER — ANESTHESIA EVENT (OUTPATIENT)
Dept: GASTROENTEROLOGY | Facility: HOSPITAL | Age: 68
End: 2023-12-08
Payer: MEDICARE

## 2023-12-08 VITALS
SYSTOLIC BLOOD PRESSURE: 150 MMHG | HEART RATE: 104 BPM | BODY MASS INDEX: 37.39 KG/M2 | DIASTOLIC BLOOD PRESSURE: 82 MMHG | OXYGEN SATURATION: 95 % | RESPIRATION RATE: 14 BRPM | TEMPERATURE: 97.6 F | HEIGHT: 72 IN | WEIGHT: 276.02 LBS

## 2023-12-08 DIAGNOSIS — K21.9 GASTROESOPHAGEAL REFLUX DISEASE, UNSPECIFIED WHETHER ESOPHAGITIS PRESENT: ICD-10-CM

## 2023-12-08 DIAGNOSIS — L50.9 URTICARIA: ICD-10-CM

## 2023-12-08 DIAGNOSIS — Z12.11 ENCOUNTER FOR SCREENING COLONOSCOPY FOR NON-HIGH-RISK PATIENT: ICD-10-CM

## 2023-12-08 PROCEDURE — 88305 TISSUE EXAM BY PATHOLOGIST: CPT | Performed by: STUDENT IN AN ORGANIZED HEALTH CARE EDUCATION/TRAINING PROGRAM

## 2023-12-08 PROCEDURE — 25810000003 LACTATED RINGERS PER 1000 ML: Performed by: STUDENT IN AN ORGANIZED HEALTH CARE EDUCATION/TRAINING PROGRAM

## 2023-12-08 PROCEDURE — 25010000002 PROPOFOL 10 MG/ML EMULSION: Performed by: ANESTHESIOLOGIST ASSISTANT

## 2023-12-08 PROCEDURE — 25810000003 SODIUM CHLORIDE 0.9 % SOLUTION: Performed by: STUDENT IN AN ORGANIZED HEALTH CARE EDUCATION/TRAINING PROGRAM

## 2023-12-08 RX ORDER — SODIUM CHLORIDE, SODIUM LACTATE, POTASSIUM CHLORIDE, CALCIUM CHLORIDE 600; 310; 30; 20 MG/100ML; MG/100ML; MG/100ML; MG/100ML
30 INJECTION, SOLUTION INTRAVENOUS CONTINUOUS
Status: DISCONTINUED | OUTPATIENT
Start: 2023-12-08 | End: 2023-12-08 | Stop reason: HOSPADM

## 2023-12-08 RX ORDER — SODIUM CHLORIDE 9 MG/ML
50 INJECTION, SOLUTION INTRAVENOUS CONTINUOUS
Status: DISCONTINUED | OUTPATIENT
Start: 2023-12-08 | End: 2023-12-08 | Stop reason: HOSPADM

## 2023-12-08 RX ADMIN — SODIUM CHLORIDE 50 ML/HR: 9 INJECTION, SOLUTION INTRAVENOUS at 09:23

## 2023-12-08 RX ADMIN — PROPOFOL 100 MCG/KG/MIN: 10 INJECTION, EMULSION INTRAVENOUS at 09:51

## 2023-12-08 RX ADMIN — SODIUM CHLORIDE, SODIUM LACTATE, POTASSIUM CHLORIDE, AND CALCIUM CHLORIDE: .6; .31; .03; .02 INJECTION, SOLUTION INTRAVENOUS at 10:10

## 2023-12-08 NOTE — ANESTHESIA PREPROCEDURE EVALUATION
Anesthesia Evaluation     Patient summary reviewed and Nursing notes reviewed   NPO Solid Status: > 8 hours  NPO Liquid Status: > 8 hours           Airway   Mallampati: II  TM distance: >3 FB  Neck ROM: full  No difficulty expected  Dental - normal exam   (+) edentulous    Pulmonary - normal exam   (+) COPD,home oxygen, sleep apnea on CPAP  Cardiovascular - normal exam    ECG reviewed    (+) hypertension, CAD, PVD, hyperlipidemia      Neuro/Psych  (+) seizures, numbness  GI/Hepatic/Renal/Endo    (+) obesity, GERD, renal disease-    Musculoskeletal     Abdominal  - normal exam    Bowel sounds: normal.   Substance History      OB/GYN          Other   arthritis,                 Anesthesia Plan    ASA 3     general and MAC     intravenous induction     Anesthetic plan, risks, benefits, and alternatives have been provided, discussed and informed consent has been obtained with: patient.    Plan discussed with CAA and CRNA.    CODE STATUS:

## 2023-12-08 NOTE — ANESTHESIA POSTPROCEDURE EVALUATION
Patient: aJmes Cleveland    Procedure Summary       Date: 12/08/23 Room / Location: [facility] ENDOSCOPY 1 / [facility] ENDOSCOPY    Anesthesia Start: 1019 Anesthesia Stop: 1055    Procedure: COLONOSCOPY WITH POLYPECTOMY X 2 Diagnosis:       Encounter for screening colonoscopy for non-high-risk patient      (Encounter for screening colonoscopy for non-high-risk patient [Z12.11])    Surgeons: Christian Mehta MD Provider: Ricardo Snyder MD    Anesthesia Type: general, MAC ASA Status: 3            Anesthesia Type: general, MAC    Vitals  Vitals Value Taken Time   /82 12/08/23 1111   Temp     Pulse 104 12/08/23 1111   Resp 14 12/08/23 1111   SpO2 95 % 12/08/23 1111           Post Anesthesia Care and Evaluation    Patient location during evaluation: PACU  Patient participation: complete - patient participated  Level of consciousness: awake  Pain scale: See nurse's notes for pain score.  Pain management: adequate    Airway patency: patent  Anesthetic complications: No anesthetic complications  PONV Status: none  Cardiovascular status: acceptable  Respiratory status: acceptable and spontaneous ventilation  Hydration status: acceptable    Comments: Patient seen and examined postoperatively; vital signs stable; SpO2 greater than or equal to 90%; cardiopulmonary status stable; nausea/vomiting adequately controlled; pain adequately controlled; no apparent anesthesia complications; patient discharged from anesthesia care when discharge criteria were met

## 2023-12-08 NOTE — DISCHARGE INSTRUCTIONS
A responsible adult should stay with you and you should rest quietly for the rest of the day.    Do not drink alcohol, drive, operate any heavy machinery or power tools or make any legal/important decisions for the next 24 hours.     Progress your diet as tolerated.  If you begin to experience severe pain, increased shortness of breath, racing heartbeat or a fever above 101 F, seek immediate medical attention.     Follow up with MD as instructed. Call office for results in 3 to 5 days if needed.       POST COLONOSCOPY DISCHARGE INSTRUCTIONS    Immediate Action:  Please call our clinic at [phone] if you experience any of the following symptoms:    Fever over 100°F  Abnormal abdominal pain  Rectal bleeding more than a tablespoon  Nausea or vomiting that does not resolve  After Clinic Hours:  You will be directed to our on-call physician for assistance.    Emergencies:  For emergent needs, please call [phone].    Cautionary Note for Sedation: If sedation was administered during the procedure, exercise caution as these medications can alter judgment and reflexes for at least 12 hours afterward. It is mandatory to have someone responsible over 18 years of age drive you home and stay with you.    Avoid Activities:  Please refrain from:    Driving  Consuming alcoholic beverages  Taking additional sedatives  Signing legal documents  Operating heavy machinery  Resumption of Normal Activities:  You may:    Resume a normal diet unless instructed otherwise by your doctor.  Resume normal medications unless otherwise instructed by your doctor.  Bloating:   Bloating may occur over the next 1-2 days, which is normal post-procedure.    Follow-Up for Results:  If polyps or tissue samples were removed during your procedure, anticipate receiving your provider's interpretation of results approximately 7-14 business days after the procedure. This information will be conveyed via Loom (if signed up), letter via mail, or a phone  jt.      Christian Mehta MD  Colon and Rectal Surgery   [facility]

## 2023-12-08 NOTE — OP NOTE
Colonoscopy Operative Note    Name: James Cleveland  : 1955  Date of Colonoscopy: 2023  Procedure: Average Risk Screening Colonoscopy  Surgeon: Christian Mehta MD   Anesthesia: Sedation   IV Fluids: refer to anesthesia record    Procedure Details:  The patient was brought to the endoscopy suite and placed in the left lateral decubitus position. Conscious sedation was administered by the anesthesia team. Vital signs including blood pressure, heart rate, and oxygen saturation were monitored continuously throughout the procedure.    The colonoscope was introduced through the rectum and advanced through the entire colon under direct visualization. The scope was maneuvered carefully, and the mucosa of the rectum, sigmoid colon, descending colon, transverse colon, ascending colon, and cecum were thoroughly inspected. Adequate insufflation was maintained throughout the procedure for optimal visualization.    Findings:    Rectum: No abnormalities noted.  Sigmoid colon: 5 mm polyp   Descending colon: No significant findings detected.  Transverse colon: 5 mm polyp   Ascending colon: No abnormalities identified.  Cecum: Normal appearance with no lesions or abnormalities.    Interventions:  During the procedure, two diminutive polyp measuring less than 5 mm in size was identified in the transverse colon and sigmoid colon. The polyp were visualized and removed using cold biopsy forceps. Hemostasis was achieved successfully at the site of polypectomy without any immediate complications.     Specimens:  The removed polyps was retrieved and sent for histopathological examination to the pathology department for further analysis.    Recommendation: Next colonoscopy 10 years     Conclusion:  The screening colonoscopy was completed successfully, and the entire colon was visualized without any complications apart from the removal of the diminutive polyp in the sigmoid colon and transverse colon. The patient tolerated the  procedure well and was transferred to the recovery area in stable condition. Post-procedure instructions and follow-up were discussed with the patient and will be provided in written form.    Christian Mehta MD  Colon and Rectal Surgery   [address], [address]  T: [phone]

## 2023-12-11 ENCOUNTER — TELEPHONE (OUTPATIENT)
Age: 68
End: 2023-12-11
Payer: MEDICARE

## 2023-12-11 LAB
LAB AP CASE REPORT: NORMAL
PATH REPORT.FINAL DX SPEC: NORMAL
PATH REPORT.GROSS SPEC: NORMAL

## 2023-12-11 NOTE — TELEPHONE ENCOUNTER
Follow Up Discussion on Pathology Results and Recommendations     I have called James Solizson  and discussed his pathology report from his recent colonoscopy. The results indicated his sigmoid polyp to be a tubular adenoma.       In light of these findings, we discussed his next colonoscopy should be 7-10 years from now.       Christian Mehta MD  Colon and Rectal Surgery   [facility]   [address] [place], [address]  T: [phone]

## 2024-04-29 ENCOUNTER — HOSPITAL ENCOUNTER (OUTPATIENT)
Dept: CARDIOLOGY | Facility: HOSPITAL | Age: 69
Discharge: HOME | End: 2024-04-29
Payer: MEDICARE

## 2024-04-29 ENCOUNTER — LAB (OUTPATIENT)
Dept: LAB | Facility: HOSPITAL | Age: 69
End: 2024-04-29
Payer: MEDICARE

## 2024-04-29 LAB
ANION GAP SERPL CALCULATED.3IONS-SCNC: 13 MMOL/L (ref 5–15)
APTT PPP: 30.2 SECONDS (ref 24–31)
BASOPHILS # BLD AUTO: 0.05 10*3/MM3 (ref 0–0.2)
BASOPHILS NFR BLD AUTO: 0.7 % (ref 0–1.5)
BUN SERPL-MCNC: 19 MG/DL (ref 8–23)
BUN/CREAT SERPL: 17.3 (ref 7–25)
CALCIUM SPEC-SCNC: 9.8 MG/DL (ref 8.6–10.5)
CHLORIDE SERPL-SCNC: 103 MMOL/L (ref 98–107)
CO2 SERPL-SCNC: 22 MMOL/L (ref 22–29)
CREAT SERPL-MCNC: 1.1 MG/DL (ref 0.76–1.27)
DEPRECATED RDW RBC AUTO: 43.5 FL (ref 37–54)
EGFRCR SERPLBLD CKD-EPI 2021: 73.1 ML/MIN/1.73
EOSINOPHIL # BLD AUTO: 0.39 10*3/MM3 (ref 0–0.4)
EOSINOPHIL NFR BLD AUTO: 5.2 % (ref 0.3–6.2)
ERYTHROCYTE [DISTWIDTH] IN BLOOD BY AUTOMATED COUNT: 13.7 % (ref 12.3–15.4)
GLUCOSE SERPL-MCNC: 97 MG/DL (ref 65–99)
HCT VFR BLD AUTO: 44.8 % (ref 37.5–51)
HGB BLD-MCNC: 15.1 G/DL (ref 13–17.7)
IMM GRANULOCYTES # BLD AUTO: 0.02 10*3/MM3 (ref 0–0.05)
IMM GRANULOCYTES NFR BLD AUTO: 0.3 % (ref 0–0.5)
INR PPP: 1.03 (ref 0.93–1.1)
LYMPHOCYTES # BLD AUTO: 2.09 10*3/MM3 (ref 0.7–3.1)
LYMPHOCYTES NFR BLD AUTO: 27.8 % (ref 19.6–45.3)
MCH RBC QN AUTO: 29.3 PG (ref 26.6–33)
MCHC RBC AUTO-ENTMCNC: 33.7 G/DL (ref 31.5–35.7)
MCV RBC AUTO: 87 FL (ref 79–97)
MONOCYTES # BLD AUTO: 0.72 10*3/MM3 (ref 0.1–0.9)
MONOCYTES NFR BLD AUTO: 9.6 % (ref 5–12)
NEUTROPHILS NFR BLD AUTO: 4.24 10*3/MM3 (ref 1.7–7)
NEUTROPHILS NFR BLD AUTO: 56.4 % (ref 42.7–76)
NRBC BLD AUTO-RTO: 0 /100 WBC (ref 0–0.2)
PLATELET # BLD AUTO: 218 10*3/MM3 (ref 140–450)
PMV BLD AUTO: 10.9 FL (ref 6–12)
POTASSIUM SERPL-SCNC: 4.2 MMOL/L (ref 3.5–5.2)
PROTHROMBIN TIME: 11.2 SECONDS (ref 9.6–11.7)
QT INTERVAL: 398 MS
QTC INTERVAL: 460 MS
RBC # BLD AUTO: 5.15 10*6/MM3 (ref 4.14–5.8)
SODIUM SERPL-SCNC: 138 MMOL/L (ref 136–145)
WBC NRBC COR # BLD AUTO: 7.51 10*3/MM3 (ref 3.4–10.8)

## 2024-04-29 PROCEDURE — 85730 THROMBOPLASTIN TIME PARTIAL: CPT | Performed by: INTERNAL MEDICINE

## 2024-04-29 PROCEDURE — 36415 COLL VENOUS BLD VENIPUNCTURE: CPT | Performed by: INTERNAL MEDICINE

## 2024-04-29 PROCEDURE — 85025 COMPLETE CBC W/AUTO DIFF WBC: CPT | Performed by: INTERNAL MEDICINE

## 2024-04-29 PROCEDURE — 80048 BASIC METABOLIC PNL TOTAL CA: CPT | Performed by: INTERNAL MEDICINE

## 2024-04-29 PROCEDURE — 93005 ELECTROCARDIOGRAM TRACING: CPT | Performed by: INTERNAL MEDICINE

## 2024-04-29 PROCEDURE — 85610 PROTHROMBIN TIME: CPT | Performed by: INTERNAL MEDICINE

## 2024-05-03 ENCOUNTER — HOSPITAL ENCOUNTER (OUTPATIENT)
Facility: HOSPITAL | Age: 69
Setting detail: HOSPITAL OUTPATIENT SURGERY
Discharge: HOME | End: 2024-05-03
Attending: INTERNAL MEDICINE | Admitting: INTERNAL MEDICINE
Payer: MEDICARE

## 2024-05-03 ENCOUNTER — APPOINTMENT (OUTPATIENT)
Dept: GENERAL RADIOLOGY | Facility: HOSPITAL | Age: 69
End: 2024-05-03
Payer: MEDICARE

## 2024-05-03 ENCOUNTER — ANESTHESIA (OUTPATIENT)
Dept: GASTROENTEROLOGY | Facility: HOSPITAL | Age: 69
End: 2024-05-03
Payer: MEDICARE

## 2024-05-03 ENCOUNTER — ANESTHESIA EVENT (OUTPATIENT)
Dept: GASTROENTEROLOGY | Facility: HOSPITAL | Age: 69
End: 2024-05-03
Payer: MEDICARE

## 2024-05-03 ENCOUNTER — APPOINTMENT (OUTPATIENT)
Dept: CT IMAGING | Facility: HOSPITAL | Age: 69
End: 2024-05-03
Payer: MEDICARE

## 2024-05-03 VITALS
TEMPERATURE: 98 F | OXYGEN SATURATION: 94 % | BODY MASS INDEX: 37.09 KG/M2 | WEIGHT: 273.81 LBS | RESPIRATION RATE: 16 BRPM | DIASTOLIC BLOOD PRESSURE: 55 MMHG | HEIGHT: 72 IN | SYSTOLIC BLOOD PRESSURE: 103 MMHG | HEART RATE: 80 BPM

## 2024-05-03 DIAGNOSIS — C34.82 CANCER OF OVERLAPPING SITES OF LEFT LUNG: ICD-10-CM

## 2024-05-03 DIAGNOSIS — R91.8 LUNG NODULES: ICD-10-CM

## 2024-05-03 PROCEDURE — 25010000002 EPINEPHRINE 1 MG/10ML SOLUTION PREFILLED SYRINGE: Performed by: INTERNAL MEDICINE

## 2024-05-03 PROCEDURE — 25810000003 SODIUM CHLORIDE 0.9 % SOLUTION: Performed by: INTERNAL MEDICINE

## 2024-05-03 PROCEDURE — 25010000002 PROPOFOL 200 MG/20ML EMULSION: Performed by: ANESTHESIOLOGIST ASSISTANT

## 2024-05-03 PROCEDURE — 87206 SMEAR FLUORESCENT/ACID STAI: CPT | Performed by: INTERNAL MEDICINE

## 2024-05-03 PROCEDURE — 25010000002 SUCCINYLCHOLINE PER 20 MG: Performed by: ANESTHESIOLOGIST ASSISTANT

## 2024-05-03 PROCEDURE — 94761 N-INVAS EAR/PLS OXIMETRY MLT: CPT

## 2024-05-03 PROCEDURE — 76000 FLUOROSCOPY <1 HR PHYS/QHP: CPT

## 2024-05-03 PROCEDURE — 88334 PATH CONSLTJ SURG CYTO XM EA: CPT | Performed by: INTERNAL MEDICINE

## 2024-05-03 PROCEDURE — 88305 TISSUE EXAM BY PATHOLOGIST: CPT | Performed by: INTERNAL MEDICINE

## 2024-05-03 PROCEDURE — 87102 FUNGUS ISOLATION CULTURE: CPT | Performed by: INTERNAL MEDICINE

## 2024-05-03 PROCEDURE — 87077 CULTURE AEROBIC IDENTIFY: CPT | Performed by: INTERNAL MEDICINE

## 2024-05-03 PROCEDURE — 25010000002 ONDANSETRON PER 1 MG: Performed by: ANESTHESIOLOGIST ASSISTANT

## 2024-05-03 PROCEDURE — 87252 VIRUS INOCULATION TISSUE: CPT | Performed by: INTERNAL MEDICINE

## 2024-05-03 PROCEDURE — 94640 AIRWAY INHALATION TREATMENT: CPT

## 2024-05-03 PROCEDURE — 94799 UNLISTED PULMONARY SVC/PX: CPT

## 2024-05-03 PROCEDURE — 87798 DETECT AGENT NOS DNA AMP: CPT | Performed by: INTERNAL MEDICINE

## 2024-05-03 PROCEDURE — 88177 CYTP FNA EVAL EA ADDL: CPT | Performed by: INTERNAL MEDICINE

## 2024-05-03 PROCEDURE — 87116 MYCOBACTERIA CULTURE: CPT | Performed by: INTERNAL MEDICINE

## 2024-05-03 PROCEDURE — 71250 CT THORAX DX C-: CPT

## 2024-05-03 PROCEDURE — 87071 CULTURE AEROBIC QUANT OTHER: CPT | Performed by: INTERNAL MEDICINE

## 2024-05-03 PROCEDURE — 88172 CYTP DX EVAL FNA 1ST EA SITE: CPT | Performed by: INTERNAL MEDICINE

## 2024-05-03 PROCEDURE — 71045 X-RAY EXAM CHEST 1 VIEW: CPT

## 2024-05-03 PROCEDURE — 25010000002 SUGAMMADEX 200 MG/2ML SOLUTION: Performed by: ANESTHESIOLOGIST ASSISTANT

## 2024-05-03 PROCEDURE — 25010000002 DEXAMETHASONE PER 1 MG: Performed by: ANESTHESIOLOGIST ASSISTANT

## 2024-05-03 PROCEDURE — 88333 PATH CONSLTJ SURG CYTO XM 1: CPT | Performed by: INTERNAL MEDICINE

## 2024-05-03 PROCEDURE — 25010000002 FENTANYL CITRATE (PF) 100 MCG/2ML SOLUTION: Performed by: ANESTHESIOLOGIST ASSISTANT

## 2024-05-03 PROCEDURE — 87205 SMEAR GRAM STAIN: CPT | Performed by: INTERNAL MEDICINE

## 2024-05-03 PROCEDURE — 87185 SC STD ENZYME DETCJ PER NZM: CPT | Performed by: INTERNAL MEDICINE

## 2024-05-03 PROCEDURE — 87385 HISTOPLASMA CAPSUL AG IA: CPT | Performed by: INTERNAL MEDICINE

## 2024-05-03 PROCEDURE — 25010000002 VASOPRESSIN 20 UNIT/ML SOLUTION: Performed by: ANESTHESIOLOGIST ASSISTANT

## 2024-05-03 PROCEDURE — 88108 CYTOPATH CONCENTRATE TECH: CPT | Performed by: INTERNAL MEDICINE

## 2024-05-03 PROCEDURE — 88312 SPECIAL STAINS GROUP 1: CPT | Performed by: INTERNAL MEDICINE

## 2024-05-03 PROCEDURE — 0202U NFCT DS 22 TRGT SARS-COV-2: CPT | Performed by: INTERNAL MEDICINE

## 2024-05-03 RX ORDER — ONDANSETRON 2 MG/ML
INJECTION INTRAMUSCULAR; INTRAVENOUS AS NEEDED
Status: DISCONTINUED | OUTPATIENT
Start: 2024-05-03 | End: 2024-05-03 | Stop reason: SURG

## 2024-05-03 RX ORDER — PROMETHAZINE HYDROCHLORIDE 25 MG/1
25 SUPPOSITORY RECTAL ONCE AS NEEDED
Status: DISCONTINUED | OUTPATIENT
Start: 2024-05-03 | End: 2024-05-03 | Stop reason: HOSPADM

## 2024-05-03 RX ORDER — ACETAMINOPHEN 650 MG/1
650 SUPPOSITORY RECTAL EVERY 4 HOURS PRN
Status: DISCONTINUED | OUTPATIENT
Start: 2024-05-03 | End: 2024-05-03 | Stop reason: HOSPADM

## 2024-05-03 RX ORDER — OXYCODONE HYDROCHLORIDE 5 MG/1
7.5 TABLET ORAL EVERY 4 HOURS PRN
Status: DISCONTINUED | OUTPATIENT
Start: 2024-05-03 | End: 2024-05-03 | Stop reason: HOSPADM

## 2024-05-03 RX ORDER — PROPOFOL 10 MG/ML
INJECTION, EMULSION INTRAVENOUS AS NEEDED
Status: DISCONTINUED | OUTPATIENT
Start: 2024-05-03 | End: 2024-05-03 | Stop reason: SURG

## 2024-05-03 RX ORDER — ONDANSETRON 2 MG/ML
4 INJECTION INTRAMUSCULAR; INTRAVENOUS ONCE AS NEEDED
Status: DISCONTINUED | OUTPATIENT
Start: 2024-05-03 | End: 2024-05-03 | Stop reason: HOSPADM

## 2024-05-03 RX ORDER — DEXAMETHASONE SODIUM PHOSPHATE 4 MG/ML
INJECTION, SOLUTION INTRA-ARTICULAR; INTRALESIONAL; INTRAMUSCULAR; INTRAVENOUS; SOFT TISSUE AS NEEDED
Status: DISCONTINUED | OUTPATIENT
Start: 2024-05-03 | End: 2024-05-03 | Stop reason: SURG

## 2024-05-03 RX ORDER — DIPHENHYDRAMINE HCL 25 MG
25 CAPSULE ORAL
Status: DISCONTINUED | OUTPATIENT
Start: 2024-05-03 | End: 2024-05-03 | Stop reason: HOSPADM

## 2024-05-03 RX ORDER — ACETAMINOPHEN 325 MG/1
650 TABLET ORAL ONCE AS NEEDED
Status: DISCONTINUED | OUTPATIENT
Start: 2024-05-03 | End: 2024-05-03 | Stop reason: HOSPADM

## 2024-05-03 RX ORDER — MEPERIDINE HYDROCHLORIDE 25 MG/ML
12.5 INJECTION INTRAMUSCULAR; INTRAVENOUS; SUBCUTANEOUS
Status: DISCONTINUED | OUTPATIENT
Start: 2024-05-03 | End: 2024-05-03 | Stop reason: HOSPADM

## 2024-05-03 RX ORDER — DIPHENHYDRAMINE HYDROCHLORIDE 50 MG/ML
12.5 INJECTION INTRAMUSCULAR; INTRAVENOUS ONCE AS NEEDED
Status: DISCONTINUED | OUTPATIENT
Start: 2024-05-03 | End: 2024-05-03 | Stop reason: HOSPADM

## 2024-05-03 RX ORDER — EPHEDRINE SULFATE 5 MG/ML
5 INJECTION INTRAVENOUS ONCE AS NEEDED
Status: DISCONTINUED | OUTPATIENT
Start: 2024-05-03 | End: 2024-05-03 | Stop reason: HOSPADM

## 2024-05-03 RX ORDER — HYDRALAZINE HYDROCHLORIDE 20 MG/ML
5 INJECTION INTRAMUSCULAR; INTRAVENOUS
Status: DISCONTINUED | OUTPATIENT
Start: 2024-05-03 | End: 2024-05-03 | Stop reason: HOSPADM

## 2024-05-03 RX ORDER — FENTANYL CITRATE 50 UG/ML
100 INJECTION, SOLUTION INTRAMUSCULAR; INTRAVENOUS
Status: DISCONTINUED | OUTPATIENT
Start: 2024-05-03 | End: 2024-05-03 | Stop reason: HOSPADM

## 2024-05-03 RX ORDER — LABETALOL HYDROCHLORIDE 5 MG/ML
5 INJECTION, SOLUTION INTRAVENOUS
Status: DISCONTINUED | OUTPATIENT
Start: 2024-05-03 | End: 2024-05-03 | Stop reason: HOSPADM

## 2024-05-03 RX ORDER — PHENYLEPHRINE HCL IN 0.9% NACL 1 MG/10 ML
SYRINGE (ML) INTRAVENOUS AS NEEDED
Status: DISCONTINUED | OUTPATIENT
Start: 2024-05-03 | End: 2024-05-03 | Stop reason: SURG

## 2024-05-03 RX ORDER — FLUMAZENIL 0.1 MG/ML
0.5 INJECTION INTRAVENOUS AS NEEDED
Status: DISCONTINUED | OUTPATIENT
Start: 2024-05-03 | End: 2024-05-03 | Stop reason: HOSPADM

## 2024-05-03 RX ORDER — SUCCINYLCHOLINE CHLORIDE 20 MG/ML
INJECTION INTRAMUSCULAR; INTRAVENOUS AS NEEDED
Status: DISCONTINUED | OUTPATIENT
Start: 2024-05-03 | End: 2024-05-03 | Stop reason: SURG

## 2024-05-03 RX ORDER — ROCURONIUM BROMIDE 10 MG/ML
INJECTION, SOLUTION INTRAVENOUS AS NEEDED
Status: DISCONTINUED | OUTPATIENT
Start: 2024-05-03 | End: 2024-05-03 | Stop reason: SURG

## 2024-05-03 RX ORDER — LIDOCAINE HYDROCHLORIDE 10 MG/ML
INJECTION, SOLUTION EPIDURAL; INFILTRATION; INTRACAUDAL; PERINEURAL AS NEEDED
Status: DISCONTINUED | OUTPATIENT
Start: 2024-05-03 | End: 2024-05-03 | Stop reason: SURG

## 2024-05-03 RX ORDER — PROMETHAZINE HYDROCHLORIDE 25 MG/1
25 TABLET ORAL ONCE AS NEEDED
Status: DISCONTINUED | OUTPATIENT
Start: 2024-05-03 | End: 2024-05-03 | Stop reason: HOSPADM

## 2024-05-03 RX ORDER — DIPHENHYDRAMINE HYDROCHLORIDE 50 MG/ML
12.5 INJECTION INTRAMUSCULAR; INTRAVENOUS
Status: DISCONTINUED | OUTPATIENT
Start: 2024-05-03 | End: 2024-05-03 | Stop reason: HOSPADM

## 2024-05-03 RX ORDER — FENTANYL CITRATE 50 UG/ML
50 INJECTION, SOLUTION INTRAMUSCULAR; INTRAVENOUS
Status: DISCONTINUED | OUTPATIENT
Start: 2024-05-03 | End: 2024-05-03 | Stop reason: HOSPADM

## 2024-05-03 RX ORDER — NALOXONE HCL 0.4 MG/ML
0.4 VIAL (ML) INJECTION AS NEEDED
Status: DISCONTINUED | OUTPATIENT
Start: 2024-05-03 | End: 2024-05-03 | Stop reason: HOSPADM

## 2024-05-03 RX ORDER — LORAZEPAM 2 MG/ML
1 INJECTION INTRAMUSCULAR
Status: DISCONTINUED | OUTPATIENT
Start: 2024-05-03 | End: 2024-05-03 | Stop reason: HOSPADM

## 2024-05-03 RX ORDER — EPINEPHRINE IN SOD CHLOR,ISO 1 MG/10 ML
SYRINGE (ML) INTRAVENOUS AS NEEDED
Status: DISCONTINUED | OUTPATIENT
Start: 2024-05-03 | End: 2024-05-03 | Stop reason: HOSPADM

## 2024-05-03 RX ORDER — IPRATROPIUM BROMIDE AND ALBUTEROL SULFATE 2.5; .5 MG/3ML; MG/3ML
3 SOLUTION RESPIRATORY (INHALATION)
Status: DISCONTINUED | OUTPATIENT
Start: 2024-05-03 | End: 2024-05-03 | Stop reason: HOSPADM

## 2024-05-03 RX ORDER — VASOPRESSIN 20 U/ML
INJECTION PARENTERAL AS NEEDED
Status: DISCONTINUED | OUTPATIENT
Start: 2024-05-03 | End: 2024-05-03 | Stop reason: SURG

## 2024-05-03 RX ORDER — IPRATROPIUM BROMIDE AND ALBUTEROL SULFATE 2.5; .5 MG/3ML; MG/3ML
3 SOLUTION RESPIRATORY (INHALATION) ONCE AS NEEDED
Status: DISCONTINUED | OUTPATIENT
Start: 2024-05-03 | End: 2024-05-03 | Stop reason: HOSPADM

## 2024-05-03 RX ORDER — MIDAZOLAM HYDROCHLORIDE 1 MG/ML
2 INJECTION INTRAMUSCULAR; INTRAVENOUS
Status: DISCONTINUED | OUTPATIENT
Start: 2024-05-03 | End: 2024-05-03 | Stop reason: HOSPADM

## 2024-05-03 RX ORDER — FENTANYL CITRATE 50 UG/ML
INJECTION, SOLUTION INTRAMUSCULAR; INTRAVENOUS AS NEEDED
Status: DISCONTINUED | OUTPATIENT
Start: 2024-05-03 | End: 2024-05-03 | Stop reason: SURG

## 2024-05-03 RX ORDER — OXYCODONE HYDROCHLORIDE 5 MG/1
5 TABLET ORAL ONCE AS NEEDED
Status: DISCONTINUED | OUTPATIENT
Start: 2024-05-03 | End: 2024-05-03 | Stop reason: HOSPADM

## 2024-05-03 RX ORDER — SODIUM CHLORIDE 9 MG/ML
50 INJECTION, SOLUTION INTRAVENOUS CONTINUOUS
Status: DISCONTINUED | OUTPATIENT
Start: 2024-05-03 | End: 2024-05-03 | Stop reason: HOSPADM

## 2024-05-03 RX ADMIN — SUGAMMADEX 100 MG: 100 INJECTION, SOLUTION INTRAVENOUS at 08:51

## 2024-05-03 RX ADMIN — SUGAMMADEX 200 MG: 100 INJECTION, SOLUTION INTRAVENOUS at 08:44

## 2024-05-03 RX ADMIN — Medication 150 MCG: at 08:13

## 2024-05-03 RX ADMIN — LIDOCAINE HYDROCHLORIDE 50 MG: 10 INJECTION, SOLUTION EPIDURAL; INFILTRATION; INTRACAUDAL; PERINEURAL at 08:01

## 2024-05-03 RX ADMIN — Medication 200 MCG: at 08:18

## 2024-05-03 RX ADMIN — PROPOFOL 200 MG: 10 INJECTION, EMULSION INTRAVENOUS at 08:01

## 2024-05-03 RX ADMIN — SODIUM CHLORIDE 50 ML/HR: 9 INJECTION, SOLUTION INTRAVENOUS at 06:47

## 2024-05-03 RX ADMIN — FENTANYL CITRATE 50 MCG: 50 INJECTION, SOLUTION INTRAMUSCULAR; INTRAVENOUS at 08:07

## 2024-05-03 RX ADMIN — Medication 150 MCG: at 08:16

## 2024-05-03 RX ADMIN — PROPOFOL 150 MCG/KG/MIN: 10 INJECTION, EMULSION INTRAVENOUS at 08:04

## 2024-05-03 RX ADMIN — VASOPRESSIN 2 UNITS: 20 INJECTION INTRAVENOUS at 08:40

## 2024-05-03 RX ADMIN — FENTANYL CITRATE 50 MCG: 50 INJECTION, SOLUTION INTRAMUSCULAR; INTRAVENOUS at 08:01

## 2024-05-03 RX ADMIN — VASOPRESSIN 2 UNITS: 20 INJECTION INTRAVENOUS at 08:35

## 2024-05-03 RX ADMIN — DEXAMETHASONE SODIUM PHOSPHATE 8 MG: 4 INJECTION, SOLUTION INTRAMUSCULAR; INTRAVENOUS at 08:08

## 2024-05-03 RX ADMIN — SUCCINYLCHOLINE CHLORIDE 140 MG: 20 INJECTION, SOLUTION INTRAMUSCULAR; INTRAVENOUS at 08:01

## 2024-05-03 RX ADMIN — ROCURONIUM 50 MG: 50 INJECTION, SOLUTION INTRAVENOUS at 08:06

## 2024-05-03 RX ADMIN — LIDOCAINE HYDROCHLORIDE 60 MG: 10 INJECTION, SOLUTION EPIDURAL; INFILTRATION; INTRACAUDAL; PERINEURAL at 08:51

## 2024-05-03 RX ADMIN — IPRATROPIUM BROMIDE AND ALBUTEROL SULFATE 3 ML: .5; 3 SOLUTION RESPIRATORY (INHALATION) at 09:27

## 2024-05-03 RX ADMIN — VASOPRESSIN 1 UNITS: 20 INJECTION INTRAVENOUS at 08:27

## 2024-05-03 RX ADMIN — IPRATROPIUM BROMIDE AND ALBUTEROL SULFATE 3 ML: .5; 3 SOLUTION RESPIRATORY (INHALATION) at 07:23

## 2024-05-03 RX ADMIN — ONDANSETRON 4 MG: 2 INJECTION INTRAMUSCULAR; INTRAVENOUS at 08:39

## 2024-05-03 RX ADMIN — Medication 200 MCG: at 08:22
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
## 2024-05-03 NOTE — NURSING NOTE
Dr. Calvert NOTIFIED THAT PATIENT DOES NOT HAVE A CODE STATUS LISTED ON RECORD. dR. dRAW TO ADDRESS PRIOR TO PROCEDURE.

## 2024-05-03 NOTE — ANESTHESIA POSTPROCEDURE EVALUATION
Patient: James Cleveland    Procedure Summary       Date: 05/03/24 Room / Location: [facility] ENDOSCOPY 3 / [facility] ENDOSCOPY    Anesthesia Start: 0757 Anesthesia Stop: 0900    Procedure: BRONCHOSCOPY WITH ION ROBOT with left upper lobe fine needle aspirations, cryo tissue biopsies and bronchioalveolar washings (Bronchus) Diagnosis:       Lung nodules      Cancer of overlapping sites of left lung      (Lung nodules [R91.8])    Surgeons: Berto Salazar MD Provider: Odin Benson MD    Anesthesia Type: general ASA Status: 3            Anesthesia Type: general    Vitals  Vitals Value Taken Time   /55 05/03/24 1004   Temp 98 °F (36.7 °C) 05/03/24 0917   Pulse 80 05/03/24 1004   Resp 16 05/03/24 1004   SpO2 94 % 05/03/24 1004           Post Anesthesia Care and Evaluation    Patient location during evaluation: PACU  Patient participation: complete - patient participated  Level of consciousness: awake  Pain scale: See nurse's notes for pain score.  Pain management: adequate    Airway patency: patent  Anesthetic complications: No anesthetic complications  PONV Status: none  Cardiovascular status: acceptable  Respiratory status: acceptable and spontaneous ventilation  Hydration status: acceptable    Comments: Patient seen and examined postoperatively; vital signs stable; SpO2 greater than or equal to 90%; cardiopulmonary status stable; nausea/vomiting adequately controlled; pain adequately controlled; no apparent anesthesia complications; patient discharged from anesthesia care when discharge criteria were met

## 2024-05-03 NOTE — DISCHARGE INSTRUCTIONS
Endoscopic Bronchial Ultrasound (EBUS)  Endoscopic Bronchial Ultrasound (EBUS) allows for sampling of lymph nodes in your chest during a bronchoscopy procedure.  Samples (biopsies) of the lymph nodes are taken from inside the lungs and sent for diagnostic testing.  Final results of your biopsy take up to 5 business days to process; your endoscopic physician will contact you with your results.  EBUS is recommended in the following instances:  To diagnose different types of lung disorders (such as sarcoidosis or tuberculosis)  To diagnose or 'stage' cancer   To investigate enlarged lymph  nodes in the chest  Due to effects of sedation, do not drive or operate heavy machinery for 24 hours.  Avoid heavy lifting (>10 lbs.) or strenuous activity for 48 hours.  Continue to avoid non-steroidal anti-inflammatory medications (NSAIDS) for 5-7 days after the procedure unless told otherwise by your endoscopic and primary care physicians.  Some patients have a temporary sore throat after the procedure; this is a normal finding and over-the-counter lozenges help soothe symptoms.  You may resume normal diet once you are discharged.   Avoid red-colored foods for 24 hours.   You may resume your blood thinner medications (if applicable) as directed by your endoscopic and primary care physicians.  It is normal to have a very small amount of blood-tinged sputum immediately after the procedure. This should resolve within a few hours.  Although complications are rare, there is a small risk of pain, collapsed lung, bleeding and infection. Contact your endoscopic physician if you have these signs or symptoms (Dr. Salazar @ [phone]/ Dr. Mac @ [phone]):  Temperature greater than 102°F  Worsening pain not relieved by medications  Go to your nearest emergency room is you experience:  Shaking, chills or a temperature over 102°F.    New, sudden difficulty breathing.    New pain when taking a deep breath.    New, sudden chest  pain.  Worsening cough that produces large amounts of blood.

## 2024-05-03 NOTE — ANESTHESIA PREPROCEDURE EVALUATION
Anesthesia Evaluation     Patient summary reviewed and Nursing notes reviewed   NPO Solid Status: > 8 hours  NPO Liquid Status: > 8 hours           Airway   Mallampati: II  TM distance: >3 FB  Neck ROM: full  No difficulty expected  Dental - normal exam     Pulmonary    (+) COPD, asthma,home oxygen, sleep apnea  Cardiovascular     (+) hypertension, CAD, PVD, hyperlipidemia      Neuro/Psych  (+) seizures, numbness, psychiatric history  GI/Hepatic/Renal/Endo    (+) morbid obesity, GERD, renal disease-    Musculoskeletal     Abdominal    Substance History      OB/GYN          Other   arthritis,                 Anesthesia Plan    ASA 3     general     intravenous induction     Anesthetic plan, risks, benefits, and alternatives have been provided, discussed and informed consent has been obtained with: patient.    Plan discussed with CAA.    CODE STATUS:

## 2024-05-03 NOTE — ANESTHESIA PROCEDURE NOTES
Airway  Urgency: elective    Date/Time: 5/3/2024 8:03 AM  End Time:5/3/2024 8:03 AM  Airway not difficult    General Information and Staff    Patient location during procedure: OR  Anesthesiologist: Odin Benson MD  CRNA/CAA: Marya Hyman CAA    Indications and Patient Condition  Indications for airway management: airway protection    Preoxygenated: yes  Mask difficulty assessment: 0 - not attempted    Final Airway Details  Final airway type: endotracheal airway      Successful airway: ETT  Cuffed: yes   Successful intubation technique: video laryngoscopy  Facilitating devices/methods: intubating stylet  Endotracheal tube insertion site: oral  Blade: Perez  Blade size: 4  ETT size (mm): 8.5 (8.5 standard for ION bronchs)  Cormack-Lehane Classification: grade I - full view of glottis  Placement verified by: chest auscultation, capnometry and palpation of cuff   Measured from: lips  ETT/EBT  to lips (cm): 23  Number of attempts at approach: 1  Assessment: lips, teeth, and gum same as pre-op and atraumatic intubation

## 2024-05-03 NOTE — OP NOTE
Bronchoscopy Procedure Note    Timeout was done appropriately by staff    Procedure:  ION/robotic bronchoscopy  Ion robotic bronchoscopy directed FNA left upper lobe nodule utilizing 23-gauge needle FNA x 2  ION robotic bronchoscopy directed transbronchial biopsy utilizing cryoprobe 1.1 x 11  Left upper lobe washing    Preoperative Diagnosis: Left upper lobe nodule    Postoperative Diagnosis:     FNA and tissue touch prep suggestive of granulomatous inflammation no evidence of malignancy on preliminary pathology report    Anesthesia: General Anesthesia    Procedure Details: Patient was consented for the procedure with all risks and benefits of the procedure explained in detail.  Patient was given the opportunity to ask questions and all concerns were answered.  The bronchocope was inserted into the main airway via the endotracheal tube. An anatomical survey was done of the main airways and the subsegmental bronchus to at least the first subsegmental level of all five lobes of both lungs.  The findings are reported below.  A bronchoalveolar lavage was performed using aliquots of normal saline instilled into the airways then aspirated back.    Findings:      Started with routine bronchoscopy which was introduced through the endotracheal tube all airways were visualized to at least the first subsegment level of all 5 lobes of both lungs.  Airways were of normal size and caliber.  No endobronchial lesions seen.    Mucoid secretions noted    We retracted the regular bronchoscope    Ion robotic catheter was introduced and completed mapping    Ion catheter was directed to the lesion and left upper lobe    Location confirmation with radial probe signal and fluoroscopy    Utilizing 23-gauge needle.  FNA was done under direct fluoroscopy    Utilizing cryoprobe 1.1 multiple transbronchial biopsies x 11 was performed    No evidence of pneumothorax on fluoroscopy    After confirming no significant bleeding    We changed to  regular bronchoscope and performed left upper lobe washing      Patient tolerated procedure well        Estimated Blood Loss:  Minimal           Specimens:  Sent serosanguinous fluid                Complications:  None; patient tolerated the procedure well.           Disposition: PACU - hemodynamically stable.      Patient tolerated the procedure well.    Berto Salazar MD  5/3/2024  09:11 EDT

## 2024-05-04 LAB — NIGHT BLUE STAIN TISS: NORMAL

## 2024-05-05 LAB
BACTERIA SPEC AEROBE CULT: ABNORMAL
BACTERIA SPEC AEROBE CULT: ABNORMAL
GRAM STN SPEC: ABNORMAL
GRAM STN SPEC: ABNORMAL

## 2024-05-06 LAB
BEAKER LAB AP INTRAOPERATIVE CONSULTATION: NORMAL
LAB AP CASE REPORT: NORMAL
LAB AP CASE REPORT: NORMAL
LAB AP DIAGNOSIS COMMENT: NORMAL
LAB AP SPECIAL STAINS: NORMAL
Lab: NORMAL
PATH REPORT.FINAL DX SPEC: NORMAL
PATH REPORT.FINAL DX SPEC: NORMAL
PATH REPORT.GROSS SPEC: NORMAL
PATH REPORT.GROSS SPEC: NORMAL

## 2024-05-07 LAB
LAB AP CASE REPORT: NORMAL
P JIROVECII DNA L RESP QL NAA+NON-PROBE: NEGATIVE
PATH REPORT.FINAL DX SPEC: NORMAL
PATH REPORT.GROSS SPEC: NORMAL
REF LAB TEST METHOD: NORMAL
REF LAB TEST METHOD: NORMAL

## 2024-05-09 LAB — FUNGUS WND CULT: ABNORMAL

## 2024-05-10 LAB
MYCOBACTERIUM SPEC CULT: NORMAL
NIGHT BLUE STAIN TISS: NORMAL

## 2024-05-11 LAB — VIRUS SPEC CULT: NORMAL

## 2024-05-17 LAB
MYCOBACTERIUM SPEC CULT: NORMAL
NIGHT BLUE STAIN TISS: NORMAL

## 2024-05-23 LAB — FUNGUS WND CULT: ABNORMAL

## 2024-05-24 LAB
MYCOBACTERIUM SPEC CULT: NORMAL
NIGHT BLUE STAIN TISS: NORMAL

## 2024-05-31 LAB
MYCOBACTERIUM SPEC CULT: NORMAL
NIGHT BLUE STAIN TISS: NORMAL

## 2024-06-07 LAB
MYCOBACTERIUM SPEC CULT: NORMAL
NIGHT BLUE STAIN TISS: NORMAL

## 2024-06-14 LAB
MYCOBACTERIUM SPEC CULT: NORMAL
NIGHT BLUE STAIN TISS: NORMAL

## 2024-06-23 DIAGNOSIS — I10 ESSENTIAL HYPERTENSION: ICD-10-CM

## 2024-06-24 RX ORDER — LISINOPRIL 40 MG/1
40 TABLET ORAL DAILY
Qty: 100 TABLET | Refills: 2 | OUTPATIENT
Start: 2024-06-24

## 2024-06-24 RX ORDER — AMLODIPINE BESYLATE 5 MG/1
5 TABLET ORAL DAILY
Qty: 100 TABLET | Refills: 2 | OUTPATIENT
Start: 2024-06-24

## 2024-06-24 NOTE — TELEPHONE ENCOUNTER
I called and spoke with the patient, he confirmed he has switched PCP's. I advised him to request these medications through her and he verbalized understanding. I am refusing these refill request.

## 2024-07-23 DIAGNOSIS — F41.9 ANXIETY: ICD-10-CM

## 2024-07-24 RX ORDER — FLUOXETINE HYDROCHLORIDE 20 MG/1
20 CAPSULE ORAL DAILY
Qty: 90 CAPSULE | Refills: 3 | OUTPATIENT
Start: 2024-07-24

## 2024-07-24 NOTE — TELEPHONE ENCOUNTER
Medication not on patients list for following reason:  Ashwini Ndiaye MD  -- 10/9/2023 11:59   no longer needed - pt also believes causes wheezing

## 2024-07-25 DIAGNOSIS — I10 ESSENTIAL HYPERTENSION: ICD-10-CM

## 2024-07-26 RX ORDER — AMLODIPINE BESYLATE 5 MG/1
5 TABLET ORAL DAILY
Qty: 60 TABLET | Refills: 5 | OUTPATIENT
Start: 2024-07-26

## 2024-07-26 RX ORDER — LISINOPRIL 40 MG/1
40 TABLET ORAL DAILY
Qty: 60 TABLET | Refills: 5 | OUTPATIENT
Start: 2024-07-26

## 2024-08-15 DIAGNOSIS — S42.022S CLOSED DISPLACED FRACTURE OF SHAFT OF LEFT CLAVICLE, SEQUELA: ICD-10-CM

## 2024-08-15 DIAGNOSIS — G54.0 BRACHIAL PLEXUS NEUROPATHY: ICD-10-CM

## 2024-08-16 RX ORDER — GABAPENTIN 400 MG/1
400 CAPSULE ORAL 3 TIMES DAILY
Qty: 300 CAPSULE | Refills: 2 | OUTPATIENT
Start: 2024-08-16

## 2024-08-16 NOTE — TELEPHONE ENCOUNTER
"Per telephone encounter from 6/24/2024,  \"I called and spoke with the patient, he confirmed he has switched PCP's. I advised him to request these medications through her and he verbalized understanding.\"        "

## 2024-10-07 DIAGNOSIS — E78.2 HYPERLIPEMIA, MIXED: ICD-10-CM

## 2024-10-08 RX ORDER — ROSUVASTATIN CALCIUM 10 MG/1
10 TABLET, COATED ORAL DAILY
Qty: 100 TABLET | Refills: 2 | OUTPATIENT
Start: 2024-10-08

## 2024-12-03 DIAGNOSIS — E78.2 HYPERLIPEMIA, MIXED: ICD-10-CM

## 2024-12-04 RX ORDER — ROSUVASTATIN CALCIUM 10 MG/1
10 TABLET, COATED ORAL DAILY
Qty: 60 TABLET | Refills: 5 | OUTPATIENT
Start: 2024-12-04

## (undated) DEVICE — SOL IRRIG NACL 9PCT 1000ML BTL

## (undated) DEVICE — SUT ETHIB 0 CT1 CR8 18IN CX21D

## (undated) DEVICE — SOL IRRIG H2O 1000ML STRL

## (undated) DEVICE — UNDYED BRAIDED (POLYGLACTIN 910), SYNTHETIC ABSORBABLE SUTURE: Brand: COATED VICRYL

## (undated) DEVICE — BAPTIST FLOYD BRONCHOSCOPY: Brand: MEDLINE INDUSTRIES, INC.

## (undated) DEVICE — Device: Brand: ERBE

## (undated) DEVICE — PK MINOR LAPAROTOMY 50

## (undated) DEVICE — SUT VIC 1 CTX 36IN J977H

## (undated) DEVICE — VISION PROBE ADAPTER AND SUCTION ADAPTER

## (undated) DEVICE — CATHETER GUIDE

## (undated) DEVICE — ENSEAL 20 CM SHAFT, LARGE JAW: Brand: ENSEAL X1

## (undated) DEVICE — UNDERGLV SURG BIOGEL INDICATOR LTX PF 7

## (undated) DEVICE — GLV SURG BIOGEL SENSR LTX PF SZ6.5

## (undated) DEVICE — UNDERGLV SURG BIOGEL INDICAT PF 61/2 GRN

## (undated) DEVICE — PENCL EVAC ULTRAVAC SMOKE W/BLD

## (undated) DEVICE — VISION PROBE: Brand: ION

## (undated) DEVICE — DRSNG WND GZ PAD BORDERED 4X8IN STRL

## (undated) DEVICE — SUT ETHIB 1 CT1 30IN  X425H

## (undated) DEVICE — DRSNG WND BORDR/ADHS NONADHR/GZ LF 4X4IN STRL

## (undated) DEVICE — Device: Brand: ION

## (undated) DEVICE — BIOPSY NEEDLE, 23G: Brand: FLEXISION

## (undated) DEVICE — CATHETER: Brand: ION

## (undated) DEVICE — STAPLER, SKIN, 35W, A: Brand: MEDLINE INDUSTRIES, INC.

## (undated) DEVICE — PK ENDO GI 50

## (undated) DEVICE — ADHS LIQ MASTISOL 2/3ML

## (undated) DEVICE — CUFF SCD HEMOFORCE SEQ CALF STD MD

## (undated) DEVICE — SWIVEL CONNECTOR

## (undated) DEVICE — SINGLE-USE BIOPSY FORCEPS: Brand: RADIAL JAW 4

## (undated) DEVICE — KT SURG TURNOVER 050

## (undated) DEVICE — SUT VIC 3/0 SH 27IN J416H

## (undated) DEVICE — 3M™ STERI-STRIP™ REINFORCED ADHESIVE SKIN CLOSURES, R1547, 1/2 IN X 4 IN (12 MM X 100 MM), 6 STRIPS/ENVELOPE: Brand: 3M™ STERI-STRIP™